# Patient Record
Sex: FEMALE | Race: WHITE | NOT HISPANIC OR LATINO | Employment: FULL TIME | URBAN - METROPOLITAN AREA
[De-identification: names, ages, dates, MRNs, and addresses within clinical notes are randomized per-mention and may not be internally consistent; named-entity substitution may affect disease eponyms.]

---

## 2017-01-04 DIAGNOSIS — I10 ESSENTIAL HYPERTENSION, BENIGN: ICD-10-CM

## 2017-01-04 LAB
ALBUMIN SERPL-MCNC: 4.2 G/DL (ref 3.4–5)
ALP SERPL-CCNC: 141 U/L (ref 40–150)
ALT SERPL W P-5'-P-CCNC: 104 U/L (ref 0–50)
ANION GAP SERPL CALCULATED.3IONS-SCNC: 7 MMOL/L (ref 3–14)
AST SERPL W P-5'-P-CCNC: 68 U/L (ref 0–45)
BILIRUB SERPL-MCNC: 0.6 MG/DL (ref 0.2–1.3)
BUN SERPL-MCNC: 14 MG/DL (ref 7–30)
CALCIUM SERPL-MCNC: 9.5 MG/DL (ref 8.5–10.1)
CHLORIDE SERPL-SCNC: 98 MMOL/L (ref 94–109)
CHOLEST SERPL-MCNC: 205 MG/DL
CO2 SERPL-SCNC: 31 MMOL/L (ref 20–32)
CREAT SERPL-MCNC: 0.62 MG/DL (ref 0.52–1.04)
GFR SERPL CREATININE-BSD FRML MDRD: ABNORMAL ML/MIN/1.7M2
GLUCOSE SERPL-MCNC: 318 MG/DL (ref 70–99)
HBA1C MFR BLD: 13.3 % (ref 4.3–6)
HDLC SERPL-MCNC: 46 MG/DL
LDLC SERPL CALC-MCNC: 110 MG/DL
NONHDLC SERPL-MCNC: 159 MG/DL
POTASSIUM SERPL-SCNC: 3.9 MMOL/L (ref 3.4–5.3)
PROT SERPL-MCNC: 8.1 G/DL (ref 6.8–8.8)
SODIUM SERPL-SCNC: 136 MMOL/L (ref 133–144)
TRIGL SERPL-MCNC: 244 MG/DL

## 2017-01-04 PROCEDURE — 83036 HEMOGLOBIN GLYCOSYLATED A1C: CPT | Performed by: INTERNAL MEDICINE

## 2017-01-04 PROCEDURE — 36415 COLL VENOUS BLD VENIPUNCTURE: CPT | Performed by: INTERNAL MEDICINE

## 2017-01-04 PROCEDURE — 80061 LIPID PANEL: CPT | Performed by: INTERNAL MEDICINE

## 2017-01-04 PROCEDURE — 80053 COMPREHEN METABOLIC PANEL: CPT | Performed by: INTERNAL MEDICINE

## 2017-01-11 ENCOUNTER — TELEPHONE (OUTPATIENT)
Dept: PEDIATRICS | Facility: CLINIC | Age: 47
End: 2017-01-11

## 2017-01-11 ENCOUNTER — OFFICE VISIT (OUTPATIENT)
Dept: PEDIATRICS | Facility: CLINIC | Age: 47
End: 2017-01-11
Payer: COMMERCIAL

## 2017-01-11 VITALS
HEART RATE: 88 BPM | SYSTOLIC BLOOD PRESSURE: 132 MMHG | BODY MASS INDEX: 37.3 KG/M2 | DIASTOLIC BLOOD PRESSURE: 86 MMHG | WEIGHT: 223.9 LBS | TEMPERATURE: 98.3 F | HEIGHT: 65 IN

## 2017-01-11 DIAGNOSIS — L30.9 DERMATITIS: ICD-10-CM

## 2017-01-11 DIAGNOSIS — I10 ESSENTIAL HYPERTENSION, BENIGN: ICD-10-CM

## 2017-01-11 DIAGNOSIS — F43.23 ADJUSTMENT DISORDER WITH MIXED ANXIETY AND DEPRESSED MOOD: ICD-10-CM

## 2017-01-11 DIAGNOSIS — E78.5 DYSLIPIDEMIA: ICD-10-CM

## 2017-01-11 DIAGNOSIS — Z23 NEED FOR PROPHYLACTIC VACCINATION AND INOCULATION AGAINST INFLUENZA: ICD-10-CM

## 2017-01-11 PROCEDURE — 90471 IMMUNIZATION ADMIN: CPT | Performed by: INTERNAL MEDICINE

## 2017-01-11 PROCEDURE — 90686 IIV4 VACC NO PRSV 0.5 ML IM: CPT | Performed by: INTERNAL MEDICINE

## 2017-01-11 PROCEDURE — 99207 C FOOT EXAM  NO CHARGE: CPT | Performed by: INTERNAL MEDICINE

## 2017-01-11 PROCEDURE — 99214 OFFICE O/P EST MOD 30 MIN: CPT | Mod: 25 | Performed by: INTERNAL MEDICINE

## 2017-01-11 RX ORDER — MOMETASONE FUROATE 1 MG/G
OINTMENT TOPICAL
Qty: 45 G | Refills: 0 | Status: SHIPPED | OUTPATIENT
Start: 2017-01-11 | End: 2018-02-21

## 2017-01-11 RX ORDER — ATORVASTATIN CALCIUM 10 MG/1
10 TABLET, FILM COATED ORAL DAILY
Qty: 90 TABLET | Refills: 1 | Status: SHIPPED | OUTPATIENT
Start: 2017-01-11 | End: 2017-03-21

## 2017-01-11 NOTE — MR AVS SNAPSHOT
After Visit Summary   1/11/2017    Lala George    MRN: [id number]           Patient Information     Date Of Birth          1970        Visit Information        Provider Department      1/11/2017 8:30 AM Heidy Jama MD [facility]        Today's Diagnoses     Uncontrolled type 2 diabetes mellitus without complication, with long-term current use of insulin (H)    -  1     Essential hypertension, benign         Dyslipidemia         Dermatitis         Need for prophylactic vaccination and inoculation against influenza         Adjustment disorder with mixed anxiety and depressed mood           Care Instructions    Set up an appointment with Dr. Edmonds, our endocrinologist.  Set up an appointment with diabetes education/medical nutrition therapy in [place].     Restart trulicity at 0.75 mg weekly. After 1 week, increase to 1.5 mg weekly.    Start lipitor 10mg every evening.    My two favorites dermatologists are Dr. Janis Hernández at [facility] in [place] ([phone]) and Dr. Haider Reid at [facility] in [place].([phone]). Check with your insurance to make sure they are covered and call for an appointment.    Start sertraline at 1/2 tablet daily for 6-8 days. Then increase to 1 tablet daily.  See me back in 1 month.    Consider seeing Costa Molina, a therapist at our clinic. You can call for an appointment - [phone].            Follow-ups after your visit        Additional Services     DERMATOLOGY REFERRAL       Your provider has referred you to: N: [facility] [place] [phone]   [email or web address]  N: [facility] [place] [phone]   [email or web address]    Please be aware that coverage of these services is subject to the terms and limitations of your health insurance plan.  Call member services at your health plan with any benefit or coverage questions.      Please  bring the following with you to your appointment:    (1) Any X-Rays, CTs or MRIs which have been performed.  Contact the facility where they were done to arrange for  prior to your scheduled appointment.    (2) List of current medications  (3) This referral request   (4) Any documents/labs given to you for this referral            DIABETES EDUCATOR REFERRAL       DIABETES SELF MANAGEMENT TRAINING (DSMT)      Your provider has referred you to Diabetes Education: FMG: Diabetes Education - [facility] [place] [phone]   [email or web address]    Type of training and number of hours: Previous Diagnosis: Follow-up DSMT - 2 hours.    Medicare covers: 10 hours of initial DSMT in 12 month period from the time of first visit, plus 2 hours of follow-up DSMT annually, and additional hours as requested for insulin training.    Diabetes Type: Type 2 - On Insulin             Diabetes Co-Morbidities: none               A1C Goal:  <7.0       A1C is: A1C     13.3   1/4/2017    If an urgent visit is needed or A1C is above 12, Care Team to call the Diabetes Education Team at [phone] or send an In Basket message to the Diabetes Education Pool (P DIAB ED-PATIENT CARE).    Diabetes Education Topics: Comprehensive Knowledge Assessment and Instruction    Special Educational Needs Requiring Individual DSMT: Additional Insulin Training       MEDICAL NUTRITION THERAPY (MNT) for Diabetes    Medical Nutrition Therapy with a Registered Dietitian can be provided in coordination with Diabetes Self-Management Training to assist in achieving optimal diabetes management.    MNT Type and Hours: New diagnosis: Initial MNT - 3 hours                       Medicare will cover: 3 hours initial MNT in 12 month period after first visit, plus 2 hours of follow-up MNT annually    Please be aware that coverage of these services is subject to the terms and limitations of your health insurance plan.   Call member services at your health plan to determine Diabetes Self-Management Training benefits and ask which blood glucose monitor brands are covered by your plan.      Please bring the following with you to your appointment:    (1)  List of current medications   (2)  List of Blood Glucose Monitor brands that are covered by your insurance plan  (3)  Blood Glucose Monitor and log book  (4)   Food records for the 3 days prior to your visit    The Certified Diabetes Educator may make diabetes medication adjustments per the CDE Protocol and Collaborative Practice Agreement.            [facility] PT, HAND, AND CHIROPRACTIC REFERRAL       **This order will print in the [facility] Scheduling Office**    Physical Therapy, Hand Therapy and Chiropractic Care are available through:    *[facility]  *[facility]  *[facility]    Call one number to schedule at any of the above locations: [phone].    Your provider has referred you to: Physical Therapy at [facility] or [facility]    Indication/Reason for Referral: Women's Health (Please Complete Special Programs SmartList)  Onset of Illness:   Therapy Orders: Evaluate and Treat  Special Programs: Diabetes Focus Exercise and None  Special Request: None    Milo Pedraza      Additional Comments for the Therapist or Chiropractor:     Please be aware that coverage of these services is subject to the terms and limitations of your health insurance plan.  Call member services at your health plan with any benefit or coverage questions.      Please bring the following to your appointment:    *Your personal calendar for scheduling future appointments  *Comfortable clothing                  Who to contact     If you have questions or need follow up information about today's clinic visit or your schedule please contact [facility] [place] directly at [phone].  Normal or non-critical lab and imaging results will be communicated to you by Renetta  "letter or phone within 4 business days after the clinic has received the results. If you do not hear from us within 7 days, please contact the clinic through [facility] or phone. If you have a critical or abnormal lab result, we will notify you by phone as soon as possible.  Submit refill requests through [employer] or call your pharmacy and they will forward the refill request to us. Please allow 3 business days for your refill to be completed.          Additional Information About Your Visit        [facility] Information     [employer] gives you secure access to your electronic health record. If you see a primary care provider, you can also send messages to your care team and make appointments. If you have questions, please call your primary care clinic.  If you do not have a primary care provider, please call [phone] and they will assist you.        Care EveryWhere ID     This is your Care EveryWhere ID. This could be used by other organizations to access your [place] medical records  [id number]        Your Vitals Were     Pulse Temperature Height BMI (Body Mass Index) Last Period       88 98.3  F (36.8  C) (Oral) 5' 5\" (1.651 m) 37.26 kg/m2 01/18/2013        Blood Pressure from Last 3 Encounters:   01/11/17 132/86   07/18/16 134/88   05/18/16 106/76    Weight from Last 3 Encounters:   01/11/17 223 lb 14.4 oz (101.56 kg)   07/18/16 221 lb 1.6 oz (100.29 kg)   05/18/16 224 lb (101.606 kg)              We Performed the Following     DERMATOLOGY REFERRAL     DIABETES EDUCATOR REFERRAL     FLU VAC, SPLIT VIRUS IM > 3 YO (QUADRIVALENT) [41007]     FOOT EXAM     [facility] PT, HAND, AND CHIROPRACTIC REFERRAL     Vaccine Administration, Initial [83468]          Today's Medication Changes          These changes are accurate as of: 1/11/17  9:41 AM.  If you have any questions, ask your nurse or doctor.               Start taking these medicines.        Dose/Directions    atorvastatin 10 MG tablet   Commonly known as:  LIPITOR   Used " for:  Dyslipidemia   Started by:  Heidy Jama MD        Dose:  10 mg   Take 1 tablet (10 mg) by mouth daily   Quantity:  90 tablet   Refills:  1       sertraline 50 MG tablet   Commonly known as:  ZOLOFT   Used for:  Adjustment disorder with mixed anxiety and depressed mood   Started by:  Heidy Jama MD        Take 1/2 tablet (25 mg) for 1-2 weeks, then increase to 1 tablet orally daily   Quantity:  30 tablet   Refills:  1         These medicines have changed or have updated prescriptions.        Dose/Directions    dulaglutide 0.75 MG/0.5ML pen   Commonly known as:  TRULICITY   This may have changed:  additional instructions   Used for:  Essential hypertension, benign   Changed by:  Heidy Jama MD        Dose:  0.75 mg   Inject 0.75 mg Subcutaneous every 7 days Increase to 1.5 mg subcutaneous every 7 days after 1 week if sugars still <200   Quantity:  0.5 mL   Refills:  3            Where to get your medicines      These medications were sent to [facility]/pharmacy #[id number] - [place] - [address] [place] [address] [place] AT [address] [place], [place] [address]     Phone:  [phone]    - atorvastatin 10 MG tablet  - dulaglutide 0.75 MG/0.5ML pen  - insulin detemir 100 UNIT/ML injection  - mometasone 0.1 % ointment  - sertraline 50 MG tablet             Primary Care Provider Office Phone # Fax #    Heidy Jama [phone]       [address] [place] [address]        Thank you!     Thank you for choosing [facility]  for your care. Our goal is always to provide you with excellent care. Hearing back from our patients is one way we can continue to improve our services. Please take a few minutes to complete the written survey that you may receive in the mail after your visit with us. Thank you!             Your Updated Medication List - Protect others around you: Learn how to safely use, store and throw away your medicines at  www.disposemymeds.org.          This list is accurate as of: 1/11/17  9:41 AM.  Always use your most recent med list.                   Brand Name Dispense Instructions for use    albuterol 108 (90 BASE) MCG/ACT Inhaler    VENTOLIN HFA    1 Inhaler    Inhale 1-2 puffs into the lungs every 4 hours as needed       atorvastatin 10 MG tablet    LIPITOR    90 tablet    Take 1 tablet (10 mg) by mouth daily       beclomethasone 40 MCG/ACT Inhaler    QVAR    1 Inhaler    Inhale 2 puffs into the lungs 2 times daily       blood glucose monitoring lancets     1 Box    1 each 2 times daily Use to test blood sugar 4 times daily or as directed.       blood glucose monitoring test strip    ACCU-CHEK SMARTVIEW    3 Box    Use to test blood sugar 2 times daily or as directed.       dulaglutide 0.75 MG/0.5ML pen    TRULICITY    0.5 mL    Inject 0.75 mg Subcutaneous every 7 days Increase to 1.5 mg subcutaneous every 7 days after 1 week if sugars still <200       fluocinonide 0.05 % cream    LIDEX    30 g    apply to hands at bedtime prn       hydrochlorothiazide 25 MG tablet    HYDRODIURIL    90 tablet    Take 1 tablet (25 mg) by mouth every morning       insulin detemir 100 UNIT/ML injection    LEVEMIR FLEXPEN/FLEXTOUCH    18 mL    53 units at bedtime       insulin pen needle 31G X 6 MM    ULTICARE MINI    100 each    Use 10  daily or as directed.       losartan 100 MG tablet    COZAAR    90 tablet    Take 1 tablet (100 mg) by mouth daily       metFORMIN 500 MG 24 hr tablet    GLUCOPHAGE-XR    120 tablet    Take 4 tablets (2,000 mg) by mouth daily (with dinner)       mometasone 0.1 % ointment    ELOCON    45 g    Apply sparingly to affected area twice daily as needed.  Do not apply to face.       sertraline 50 MG tablet    ZOLOFT    30 tablet    Take 1/2 tablet (25 mg) for 1-2 weeks, then increase to 1 tablet orally daily

## 2017-01-11 NOTE — PROGRESS NOTES
"  SUBJECTIVE:                                                    Lala George is a 46 year old female who presents to clinic today for the following health issues:        Diabetes Follow-up    Patient is checking blood sugars: twice daily.  Results are as follows:         am - has not done recently         bedtime - 250-350    Diabetic concerns: None and blood sugar frequently over 200     Symptoms of hypoglycemia (low blood sugar): none     Paresthesias (numbness or burning in feet) or sores: No     Date of last diabetic eye exam: 7/2016     Hyperlipidemia Follow-Up      Rate your low fat/cholesterol diet?: good    Taking statin?  No    Other lipid medications/supplements?:  none     Hypertension Follow-up      Outpatient blood pressures are being checked at home.  Results are 125/85.    Low Salt Diet: low salt         Amount of exercise or physical activity: 2 days/week for an average of 15-20 minutes    Problems taking medications regularly: No    Medication side effects: none    Diet: carbohydrate counting    In the last year she has noticed more anxiety and depression. Cries easily. Struggling to take care of her diabetes. Feels she needs to get some help for this.     Problem list and histories reviewed & adjusted, as indicated.  Additional history: as documented    Problem list, Medication list, Allergies, and Medical/Social/Surgical histories reviewed in [facility] and updated as appropriate.    ROS:  Constitutional, HEENT, cardiovascular, pulmonary, GI, , musculoskeletal, neuro, skin, endocrine and psych systems are negative, except as otherwise noted.    OBJECTIVE:                                                    /86 mmHg  Pulse 88  Temp(Src) 98.3  F (36.8  C) (Oral)  Ht 5' 5\" (1.651 m)  Wt 223 lb 14.4 oz (101.56 kg)  BMI 37.26 kg/m2  LMP 01/18/2013  Body mass index is 37.26 kg/(m^2).  GENERAL: healthy, alert and no distress  NECK: no adenopathy, no asymmetry, masses, or scars and thyroid normal " to palpation  RESP: lungs clear to auscultation - no rales, rhonchi or wheezes  CV: regular rate and rhythm, normal S1 S2, no S3 or S4, no murmur, click or rub, no peripheral edema and peripheral pulses strong  ABDOMEN: soft, nontender, no hepatosplenomegaly, no masses and bowel sounds normal  MS: no gross musculoskeletal defects noted, no edema  NEURO: Normal strength and tone, mentation intact and speech normal  PSYCH: mentation appears normal, affect normal/bright  Diabetic foot exam: normal DP and PT pulses, no trophic changes or ulcerative lesions and normal monofilament exam  SKIN: patches of erythematous plaque. Antecubital regions.    Diagnostic Test Results:  Results for orders placed or performed in visit on 01/04/17   Hemoglobin A1c   Result Value Ref Range    Hemoglobin A1C 13.3 (H) 4.3 - 6.0 %   Lipid panel reflex to direct LDL   Result Value Ref Range    Cholesterol 205 (H) <200 mg/dL    Triglycerides 244 (H) <150 mg/dL    HDL Cholesterol 46 (L) >49 mg/dL    LDL Cholesterol Calculated 110 (H) <100 mg/dL    Non HDL Cholesterol 159 (H) <130 mg/dL   Comprehensive metabolic panel   Result Value Ref Range    Sodium 136 133 - 144 mmol/L    Potassium 3.9 3.4 - 5.3 mmol/L    Chloride 98 94 - 109 mmol/L    Carbon Dioxide 31 20 - 32 mmol/L    Anion Gap 7 3 - 14 mmol/L    Glucose 318 (H) 70 - 99 mg/dL    Urea Nitrogen 14 7 - 30 mg/dL    Creatinine 0.62 0.52 - 1.04 mg/dL    GFR Estimate >90  Non  GFR Calc   >60 mL/min/1.7m2    GFR Estimate If Black >90   GFR Calc   >60 mL/min/1.7m2    Calcium 9.5 8.5 - 10.1 mg/dL    Bilirubin Total 0.6 0.2 - 1.3 mg/dL    Albumin 4.2 3.4 - 5.0 g/dL    Protein Total 8.1 6.8 - 8.8 g/dL    Alkaline Phosphatase 141 40 - 150 U/L     (H) 0 - 50 U/L    AST 68 (H) 0 - 45 U/L        ASSESSMENT/PLAN:                                                      1. Uncontrolled type 2 diabetes mellitus without complication, with long-term current use of insulin  (H)  Inadequate control. See patient instructions section. Plan to restart trulicity.  - FOOT EXAM  - DIABETES EDUCATOR REFERRAL  - insulin detemir (LEVEMIR FLEXPEN/FLEXTOUCH) 100 UNIT/ML injection; 53 units at bedtime  Dispense: 18 mL; Refill: 11  - [facility] PT, HAND, AND CHIROPRACTIC REFERRAL    2. Essential hypertension, benign  Fair control  - dulaglutide (TRULICITY) 0.75 MG/0.5ML pen; Inject 0.75 mg Subcutaneous every 7 days Increase to 1.5 mg subcutaneous every 7 days after 1 week if sugars still <200  Dispense: 0.5 mL; Refill: 3  - insulin detemir (LEVEMIR FLEXPEN/FLEXTOUCH) 100 UNIT/ML injection; 53 units at bedtime  Dispense: 18 mL; Refill: 11    3. Dyslipidemia  Add lipitor.  - atorvastatin (LIPITOR) 10 MG tablet; Take 1 tablet (10 mg) by mouth daily  Dispense: 90 tablet; Refill: 1    4. Dermatitis  Trial on steroid cream  - mometasone (ELOCON) 0.1 % ointment; Apply sparingly to affected area twice daily as needed.  Do not apply to face.  Dispense: 45 g; Refill: 0  - DERMATOLOGY REFERRAL    5. Need for prophylactic vaccination and inoculation against influenza    - FLU VAC, SPLIT VIRUS IM > 3 YO (QUADRIVALENT) [78219]  - Vaccine Administration, Initial [83933]    6. Adjustment disorder with mixed anxiety and depressed mood  I've explained to him/her that drugs of the SSRI class can have side effects such as weight gain, sexual dysfunction, insomnia, headache, nausea. These medications are generally effective at alleviating symptoms of anxiety and/or depression. Let me know if significant side effects do occur. Discussed the possibility of increased suicidal behavior/ideation in adolescents with antidepressants.  Patient agrees if any worsening of symptoms, or any thoughts of self harm he/she will let me know immediately. If have notified the patient to go to the ER if any thoughts of suicide.  - sertraline (ZOLOFT) 50 MG tablet; Take 1/2 tablet (25 mg) for 1-2 weeks, then increase to 1 tablet orally daily   Dispense: 30 tablet; Refill: 1    Patient Instructions   Set up an appointment with Dr. Edmonds, our endocrinologist.  Set up an appointment with diabetes education/medical nutrition therapy in [place].     Restart trulicity at 0.75 mg weekly. After 1 week, increase to 1.5 mg weekly.    Start lipitor 10mg every evening.    My two favorites dermatologists are Dr. Janis Hernández at [facility] in [place] ([phone]) and Dr. Haider Reid at [facility] in [place].([phone]). Check with your insurance to make sure they are covered and call for an appointment.    Start sertraline at 1/2 tablet daily for 6-8 days. Then increase to 1 tablet daily.  See me back in 1 month.    Consider seeing Costa Molina, a therapist at our clinic. You can call for an appointment - [phone].            Heidy Rausch MD  [facility]  Injectable Influenza Immunization Documentation    1.  Is the person to be vaccinated sick today?  No    2. Does the person to be vaccinated have an allergy to eggs or to a component of the vaccine?  No    3. Has the person to be vaccinated today ever had a serious reaction to influenza vaccine in the past?  No    4. Has the person to be vaccinated ever had Guillain-Colbert syndrome?  No     Form completed by Geneva Pelaez LPN

## 2017-01-11 NOTE — PATIENT INSTRUCTIONS
Set up an appointment with Dr. Edmonds, our endocrinologist.  Set up an appointment with diabetes education/medical nutrition therapy in [place].     Restart trulicity at 0.75 mg weekly. After 1 week, increase to 1.5 mg weekly.    Start lipitor 10mg every evening.    My two favorites dermatologists are Dr. Janis Hernández at [facility] in [place] ([phone]) and Dr. Haider Reid at [facility] in [place].([phone]). Check with your insurance to make sure they are covered and call for an appointment.    Start sertraline at 1/2 tablet daily for 6-8 days. Then increase to 1 tablet daily.  See me back in 1 month.    Consider seeing Costa Molina, a therapist at our clinic. You can call for an appointment - [phone].

## 2017-01-11 NOTE — NURSING NOTE
"Chief Complaint   Patient presents with     Diabetes       Initial /86 mmHg  Pulse 88  Temp(Src) 98.3  F (36.8  C) (Oral)  Ht 5' 5\" (1.651 m)  Wt 223 lb 14.4 oz (101.56 kg)  BMI 37.26 kg/m2  LMP 01/18/2013 Estimated body mass index is 37.26 kg/(m^2) as calculated from the following:    Height as of this encounter: 5' 5\" (1.651 m).    Weight as of this encounter: 223 lb 14.4 oz (101.56 kg).  BP completed using cuff size: ana maria Pelaez LPN    "

## 2017-01-11 NOTE — TELEPHONE ENCOUNTER
Type of outreach:  talked talked with patient  Health Maintenance Due   Topic Date Due     EYE EXAM Q1 YEAR( NO INBASKET)  02/03/2016     FOOT EXAM Q1 YEAR( NO INBASKET)  03/02/2016     INFLUENZA VACCINE (SYSTEM ASSIGNED)  09/01/2016     ASTHMA CONTROL TEST Q6 MOS (NO INBASKET)  11/18/2016     Patient Will schedule eye exam.HM completed at   Geneva Pelaez LPN

## 2017-01-12 ASSESSMENT — PATIENT HEALTH QUESTIONNAIRE - PHQ9: SUM OF ALL RESPONSES TO PHQ QUESTIONS 1-9: 6

## 2017-01-12 ASSESSMENT — ASTHMA QUESTIONNAIRES: ACT_TOTALSCORE: 25

## 2017-01-13 DIAGNOSIS — E11.9 TYPE 2 DIABETES MELLITUS WITHOUT COMPLICATION, WITHOUT LONG-TERM CURRENT USE OF INSULIN (H): Primary | ICD-10-CM

## 2017-01-13 RX ORDER — METFORMIN HCL 500 MG
2000 TABLET, EXTENDED RELEASE 24 HR ORAL
Qty: 360 TABLET | Refills: 1 | Status: SHIPPED | OUTPATIENT
Start: 2017-01-13 | End: 2017-03-21

## 2017-01-13 NOTE — TELEPHONE ENCOUNTER
Metformin          Last Written Prescription Date: 12/13/16  Last Fill Quantity: unknown new to our pharmacy, # refills: unknown  Last Office Visit with G, P or [facility] prescribing provider:  1/11/17        BP Readings from Last 3 Encounters:   01/11/17 132/86   07/18/16 134/88   05/18/16 106/76     MICROL       35   5/18/2016  No results found for this basename: microalbumin  CREATININE   Date Value Ref Range Status   01/04/2017 0.62 0.52 - 1.04 mg/dL Final   ]  GFR ESTIMATE   Date Value Ref Range Status   01/04/2017 >90  Non  GFR Calc   >60 mL/min/1.7m2 Final   05/18/2016 >90  Non  GFR Calc   >60 mL/min/1.7m2 Final   04/01/2015 84 >60 mL/min/1.7m2 Final     Comment:     Non  GFR Calc     GFR ESTIMATE IF BLACK   Date Value Ref Range Status   01/04/2017 >90   GFR Calc   >60 mL/min/1.7m2 Final   05/18/2016 >90   GFR Calc   >60 mL/min/1.7m2 Final   04/01/2015 >90   GFR Calc   >60 mL/min/1.7m2 Final     CHOL      205   1/4/2017  HDL       46   1/4/2017  LDL      110   1/4/2017  TRIG      244   1/4/2017  CHOLHDLRATIO      3.2   4/1/2015  AST       68   1/4/2017  ALT      104   1/4/2017  A1C     13.3   1/4/2017  A1C     14.6   5/18/2016  A1C      8.1   4/1/2015  A1C     11.6   12/31/2014  POTASSIUM   Date Value Ref Range Status   01/04/2017 3.9 3.4 - 5.3 mmol/L Final     Niru Hong [facility]   [phone]

## 2017-02-08 ENCOUNTER — TELEPHONE (OUTPATIENT)
Dept: PHARMACY | Facility: CLINIC | Age: 47
End: 2017-02-08

## 2017-02-08 NOTE — TELEPHONE ENCOUNTER
Please call patient to make endocrinology appointment as recommended by Dr. Jama at January visit.    Bailey Scruggs, PharmD [facility]  Medication Therapy Management Practitioner   #[phone]

## 2017-02-15 ENCOUNTER — THERAPY VISIT (OUTPATIENT)
Dept: PHYSICAL THERAPY | Facility: CLINIC | Age: 47
End: 2017-02-15
Payer: COMMERCIAL

## 2017-02-15 DIAGNOSIS — M25.552 HIP PAIN, LEFT: Primary | ICD-10-CM

## 2017-02-15 DIAGNOSIS — E11.9 DIABETES MELLITUS (H): ICD-10-CM

## 2017-02-15 PROCEDURE — 97163 PT EVAL HIGH COMPLEX 45 MIN: CPT | Mod: GP | Performed by: PHYSICAL THERAPIST

## 2017-02-15 PROCEDURE — 97110 THERAPEUTIC EXERCISES: CPT | Mod: GP | Performed by: PHYSICAL THERAPIST

## 2017-02-15 NOTE — PROGRESS NOTES
Subjective:    Lala George is a 46 year old female with a left hip condition.  Condition occurred with:  A fall/slip.  Condition occurred: in the community.  This is a chronic condition  History of type II diabetes for 2 years. Pt injured left hip and lateral thigh one year ago secondary to a fall at the library. Left hip/ thigh pain limiting pt's activity level. Pt referred by MD for therapy on 1-11-17.      Radiates to:  Thigh and hip.  Pain is described as aching and is intermittent and reported as 5/10.  Associated symptoms:  Loss of motion/stiffness and loss of strength. Pain is worse during the day.  Symptoms are exacerbated by standing, ascending stairs and walking and relieved by rest.  Since onset symptoms are unchanged.  Special testing: none.  Previous treatment: none.    General health as reported by patient is good.  Pertinent medical history includes:  High blood pressure, overweight, asthma and diabetes.  Medical allergies: no.  Other surgeries include:  None reported.  Current medications:  High blood pressure medication (insulin).  Current occupation is .    Primary job tasks include:  Prolonged sitting and prolonged standing.    Barriers include:  None as reported by the patient.    Red flags: diabetes.                      Objective:      Gait:  Decreased pelvic stability with ambualtion        Flexibility/Screens:       Lower Extremity:      Decreased right lower extremity flexibility:  IT Band                                                 Hip Evaluation  HIP AROM:    Flexion: Left: 105   Right:       Abduction: Left: 10    Right:               Hip PROM:    Flexion: Left: 115  Right:    Abduction: Left: 20   Right:                    Hip Strength:    Flexion:   Right: 4/5   Pain:                    Extension:  Right: 5/5    Pain:    Abduction:  Right: 4-/5    Pain:  Adduction:  Right: 5/5   Pain:  Internal Rotation:  Right: 5/5   Pain:  External Rotation:  Right: 4-/5    Pain:            Hip Special Testing:       Right hip positive for the following special tests:  Kael's    Hip Palpation:      Left hip tenderness not present at:  IT Band               General     ROS    Assessment/Plan:      Patient is a 46 year old female with left side hip complaints.    Patient has the following significant findings with corresponding treatment plan.                Diagnosis 1:  Left hip pain/ diabetes  Pain -  hot/cold therapy, self management, education and home program  Decreased ROM/flexibility - therapeutic exercise, therapeutic activity and home program  Decreased strength - therapeutic exercise, therapeutic activities and home program  Impaired gait - home program  Decreased function - therapeutic activities and home program    Therapy Evaluation Codes:   1) History comprised of:   Personal factors that impact the plan of care:      Overall behavior pattern, Past/current experiences, Profession and Time since onset of symptoms.    Comorbidity factors that impact the plan of care are:      Asthma, Diabetes, High blood pressure and Overweight.     Medications impacting care: High blood pressure and insulin.  2) Examination of Body Systems comprised of:   Body structures and functions that impact the plan of care:      Hip, Knee and Lumbar spine.   Activity limitations that impact the plan of care are:      Squatting/kneeling, Stairs, Standing, Walking and Working.  3) Clinical presentation characteristics are:   Unstable/Unpredictable.  4) Decision-Making    High complexity using standardized patient assessment instrument and/or measureable assessment of functional outcome.  Cumulative Therapy Evaluation is: High complexity.    Previous and current functional limitations:  (See Goal Flow Sheet for this information)    Short term and Long term goals: (See Goal Flow Sheet for this information)     Communication ability:  Patient appears to be able to clearly communicate and understand verbal  and written communication and follow directions correctly.  Treatment Explanation - The following has been discussed with the patient:   RX ordered/plan of care  Anticipated outcomes  Possible risks and side effects  This patient would benefit from PT intervention to resume normal activities.   Rehab potential is good.    Frequency:  1 X week, once daily  Duration:  for 3 weeks  Discharge Plan:  Achieve all LTG.  Independent in home treatment program.  Reach maximal therapeutic benefit.    Please refer to the daily flowsheet for treatment today, total treatment time and time spent performing 1:1 timed codes.

## 2017-02-15 NOTE — MR AVS SNAPSHOT
After Visit Summary   2/15/2017    Lala George    MRN: [id number]           Patient Information     Date Of Birth          1970        Visit Information        Provider Department      2/15/2017 8:50 AM Shay Velazquez, PT [facility]        Today's Diagnoses     Hip pain, left    -  1    Diabetes mellitus (H)           Follow-ups after your visit        Who to contact     If you have questions or need follow up information about today's clinic visit or your schedule please contact [facility] directly at [phone].  Normal or non-critical lab and imaging results will be communicated to you by CL3VERhart, letter or phone within 4 business days after the clinic has received the results. If you do not hear from us within 7 days, please contact the clinic through [employer] or phone. If you have a critical or abnormal lab result, we will notify you by phone as soon as possible.  Submit refill requests through NextUser or call your pharmacy and they will forward the refill request to us. Please allow 3 business days for your refill to be completed.          Additional Information About Your Visit        MyChart Information     NextUser gives you secure access to your electronic health record. If you see a primary care provider, you can also send messages to your care team and make appointments. If you have questions, please call your primary care clinic.  If you do not have a primary care provider, please call [phone] and they will assist you.        Care EveryWhere ID     This is your Care EveryWhere ID. This could be used by other organizations to access your [facility] medical records  [id number]        Your Vitals Were     Last Period                   01/18/2013            Blood Pressure from Last 3 Encounters:   01/11/17 132/86   07/18/16 134/88   05/18/16 106/76    Weight from Last 3 Encounters:   01/11/17 101.6 kg (223 lb 14.4 oz)   07/18/16 100.3 kg (221 lb 1.6 oz)   05/18/16  101.6 kg (224 lb)              We Performed the Following     OT Eval, High Complexity (20032)     PT Eval, High Complexity (60991)     Therapeutic Exercises        Primary Care Provider Office Phone # Fax #    Heidy Jama [phone]       [facility] [address] [place] [address]        Thank you!     Thank you for choosing [facility]  for your care. Our goal is always to provide you with excellent care. Hearing back from our patients is one way we can continue to improve our services. Please take a few minutes to complete the written survey that you may receive in the mail after your visit with us. Thank you!             Your Updated Medication List - Protect others around you: Learn how to safely use, store and throw away your medicines at www.disposemymeds.org.          This list is accurate as of: 2/15/17 12:34 PM.  Always use your most recent med list.                   Brand Name Dispense Instructions for use    albuterol 108 (90 BASE) MCG/ACT Inhaler    VENTOLIN HFA    1 Inhaler    Inhale 1-2 puffs into the lungs every 4 hours as needed       atorvastatin 10 MG tablet    LIPITOR    90 tablet    Take 1 tablet (10 mg) by mouth daily       beclomethasone 40 MCG/ACT Inhaler    QVAR    1 Inhaler    Inhale 2 puffs into the lungs 2 times daily       blood glucose monitoring lancets     1 Box    1 each 2 times daily Use to test blood sugar 4 times daily or as directed.       blood glucose monitoring test strip    ACCU-CHEK SMARTVIEW    3 Box    Use to test blood sugar 2 times daily or as directed.       dulaglutide 0.75 MG/0.5ML pen    TRULICITY    0.5 mL    Inject 0.75 mg Subcutaneous every 7 days Increase to 1.5 mg subcutaneous every 7 days after 1 week if sugars still <200       fluocinonide 0.05 % cream    LIDEX    30 g    apply to hands at bedtime prn       hydrochlorothiazide 25 MG tablet    HYDRODIURIL    90 tablet    Take 1 tablet (25 mg) by mouth every morning        insulin detemir 100 UNIT/ML injection    LEVEMIR FLEXPEN/FLEXTOUCH    18 mL    53 units at bedtime       insulin pen needle 31G X 6 MM    ULTICARE MINI    100 each    Use 10  daily or as directed.       losartan 100 MG tablet    COZAAR    90 tablet    Take 1 tablet (100 mg) by mouth daily       metFORMIN 500 MG 24 hr tablet    GLUCOPHAGE-XR    360 tablet    Take 4 tablets (2,000 mg) by mouth daily (with dinner)       mometasone 0.1 % ointment    ELOCON    45 g    Apply sparingly to affected area twice daily as needed.  Do not apply to face.       sertraline 50 MG tablet    ZOLOFT    30 tablet    Take 1/2 tablet (25 mg) for 1-2 weeks, then increase to 1 tablet orally daily

## 2017-03-21 DIAGNOSIS — E11.9 TYPE 2 DIABETES MELLITUS WITHOUT COMPLICATION, WITHOUT LONG-TERM CURRENT USE OF INSULIN (H): ICD-10-CM

## 2017-03-21 DIAGNOSIS — F43.23 ADJUSTMENT DISORDER WITH MIXED ANXIETY AND DEPRESSED MOOD: ICD-10-CM

## 2017-03-21 DIAGNOSIS — I10 ESSENTIAL HYPERTENSION, BENIGN: ICD-10-CM

## 2017-03-21 DIAGNOSIS — E11.9 DIABETES MELLITUS, TYPE 2 (H): ICD-10-CM

## 2017-03-21 DIAGNOSIS — E78.5 DYSLIPIDEMIA: ICD-10-CM

## 2017-03-21 RX ORDER — LOSARTAN POTASSIUM 100 MG/1
100 TABLET ORAL DAILY
Qty: 90 TABLET | Refills: 0 | Status: SHIPPED | OUTPATIENT
Start: 2017-03-21 | End: 2017-04-10

## 2017-03-21 RX ORDER — METFORMIN HCL 500 MG
2000 TABLET, EXTENDED RELEASE 24 HR ORAL
Qty: 360 TABLET | Refills: 0 | Status: SHIPPED | OUTPATIENT
Start: 2017-03-21 | End: 2017-05-14

## 2017-03-21 RX ORDER — LANCETS
1 EACH MISCELLANEOUS 2 TIMES DAILY
Qty: 3 BOX | Refills: 3 | Status: SHIPPED | OUTPATIENT
Start: 2017-03-21 | End: 2017-04-10

## 2017-03-21 RX ORDER — ATORVASTATIN CALCIUM 10 MG/1
10 TABLET, FILM COATED ORAL DAILY
Qty: 90 TABLET | Refills: 0 | Status: SHIPPED | OUTPATIENT
Start: 2017-03-21 | End: 2017-04-10

## 2017-03-21 RX ORDER — HYDROCHLOROTHIAZIDE 25 MG/1
25 TABLET ORAL EVERY MORNING
Qty: 90 TABLET | Refills: 0 | Status: SHIPPED | OUTPATIENT
Start: 2017-03-21 | End: 2017-04-10

## 2017-03-21 NOTE — TELEPHONE ENCOUNTER
Patient calls for multiple refills to be sent to a mail order as she switched pharmacies.    Sent the refills on all requested medications as patient had refills on them at a local pharmacy-transferred the remaining refills.  Patient is due for appointment and scheduled:    Next 5 appointments (look out 90 days)     Apr 10, 2017  9:30 AM CDT   SHORT with Heidy Jama MD   [facility]    [address] [place] [id number] [phone]                  Asking for a refill on sertraline and Trulicity will route to provider, unable to provide a 90 day suppy as patient is due for appointment-scheduled.  Ok for 90 days?  Routing to PCP.    Sameera Cespedes RN  Message handled by Nurse Triage.

## 2017-03-22 NOTE — TELEPHONE ENCOUNTER
Please let sandra know that the rx for trulicity I sent is for the 1.5 mg dose. She was to increase to this from the initial dose of .75 mg if her sugars were > 200. If she is still on the .75 mg dose, let me know.  Heidy Jama M.D.

## 2017-03-23 DIAGNOSIS — E11.22 TYPE 2 DIABETES MELLITUS WITH STAGE 1 CHRONIC KIDNEY DISEASE, WITHOUT LONG-TERM CURRENT USE OF INSULIN (H): Primary | ICD-10-CM

## 2017-03-23 DIAGNOSIS — N18.1 TYPE 2 DIABETES MELLITUS WITH STAGE 1 CHRONIC KIDNEY DISEASE, WITHOUT LONG-TERM CURRENT USE OF INSULIN (H): Primary | ICD-10-CM

## 2017-03-30 NOTE — TELEPHONE ENCOUNTER
Wait for PCP to address.    Jacquelyn Diez MD  Internal Medicine/Pediatrics  [facility]

## 2017-03-30 NOTE — TELEPHONE ENCOUNTER
Coverage is denied for trulicity.  Trulicity can be approved if patient has 3 month trail resulting in failure, contraindication or or intolerance must be provided.  Bydureon ( extended release)  Kristy casiano advise.  Geneva Pelaez LPN

## 2017-03-31 DIAGNOSIS — F43.23 ADJUSTMENT DISORDER WITH MIXED ANXIETY AND DEPRESSED MOOD: ICD-10-CM

## 2017-03-31 NOTE — TELEPHONE ENCOUNTER
Routing refill request to provider for review/approval because:  Patient history of suicidal ideation. Appointment coming up.  Please sign if ok.  Cecelia Arroyo RN  Triage Nurse

## 2017-04-05 NOTE — TELEPHONE ENCOUNTER
Spoke with patient and advised of below. She states that they also do not cover Accu-Check so needs one touch sent to pharmacy. Routing to provider as patient is due to be seen and need to know if ok for 3 mo supply to mail order.  Laura Finch RN

## 2017-04-07 PROBLEM — M25.552 HIP PAIN, LEFT: Status: RESOLVED | Noted: 2017-02-15 | Resolved: 2017-04-07

## 2017-04-07 PROBLEM — E11.9 DIABETES MELLITUS (H): Status: RESOLVED | Noted: 2017-02-15 | Resolved: 2017-04-07

## 2017-04-10 ENCOUNTER — OFFICE VISIT (OUTPATIENT)
Dept: PEDIATRICS | Facility: CLINIC | Age: 47
End: 2017-04-10
Payer: COMMERCIAL

## 2017-04-10 VITALS
WEIGHT: 217.7 LBS | HEART RATE: 82 BPM | SYSTOLIC BLOOD PRESSURE: 128 MMHG | DIASTOLIC BLOOD PRESSURE: 78 MMHG | TEMPERATURE: 98.2 F | BODY MASS INDEX: 36.27 KG/M2 | OXYGEN SATURATION: 97 % | HEIGHT: 65 IN

## 2017-04-10 DIAGNOSIS — F43.23 ADJUSTMENT DISORDER WITH MIXED ANXIETY AND DEPRESSED MOOD: ICD-10-CM

## 2017-04-10 DIAGNOSIS — E78.5 HYPERLIPIDEMIA LDL GOAL <100: ICD-10-CM

## 2017-04-10 DIAGNOSIS — I10 ESSENTIAL HYPERTENSION, BENIGN: ICD-10-CM

## 2017-04-10 LAB — HBA1C MFR BLD: 13 % (ref 4.3–6)

## 2017-04-10 PROCEDURE — 80053 COMPREHEN METABOLIC PANEL: CPT | Performed by: INTERNAL MEDICINE

## 2017-04-10 PROCEDURE — 99214 OFFICE O/P EST MOD 30 MIN: CPT | Performed by: INTERNAL MEDICINE

## 2017-04-10 PROCEDURE — 36415 COLL VENOUS BLD VENIPUNCTURE: CPT | Performed by: INTERNAL MEDICINE

## 2017-04-10 PROCEDURE — 80061 LIPID PANEL: CPT | Performed by: INTERNAL MEDICINE

## 2017-04-10 PROCEDURE — 83036 HEMOGLOBIN GLYCOSYLATED A1C: CPT | Performed by: INTERNAL MEDICINE

## 2017-04-10 RX ORDER — ATORVASTATIN CALCIUM 10 MG/1
10 TABLET, FILM COATED ORAL DAILY
Qty: 90 TABLET | Refills: 3 | Status: SHIPPED | OUTPATIENT
Start: 2017-04-10 | End: 2018-01-24

## 2017-04-10 RX ORDER — LOSARTAN POTASSIUM 100 MG/1
100 TABLET ORAL DAILY
Qty: 90 TABLET | Refills: 3 | Status: SHIPPED | OUTPATIENT
Start: 2017-04-10 | End: 2018-01-24

## 2017-04-10 RX ORDER — HYDROCHLOROTHIAZIDE 25 MG/1
25 TABLET ORAL EVERY MORNING
Qty: 90 TABLET | Refills: 3 | Status: SHIPPED | OUTPATIENT
Start: 2017-04-10 | End: 2018-01-24

## 2017-04-10 NOTE — MR AVS SNAPSHOT
After Visit Summary   4/10/2017    Lala George    MRN: [id number]           Patient Information     Date Of Birth          1970        Visit Information        Provider Department      4/10/2017 9:30 AM Heidy Jama MD [facility]        Today's Diagnoses     Dyslipidemia        BENIGN HYPERTENSION        Uncontrolled type 2 diabetes mellitus without complication, with long-term current use of insulin (H)        Essential hypertension, benign        Adjustment disorder with mixed anxiety and depressed mood          Care Instructions    Set an appointment with the diabetes educator.    Start the tanzeum.    Consider seeing Dr. Edmonds, our endocrinologist.    Continue your other medications as you are.    Let's meet again in 3 months. We can do nonfasting labs a few days before.        Follow-ups after your visit        Additional Services     DIABETES EDUCATOR REFERRAL       DIABETES SELF MANAGEMENT TRAINING (DSMT)      Your provider has referred you to Diabetes Education: FMG: Diabetes Education - All [facility] [phone]   [email or web address]    Type of training and number of hours: Previous Diagnosis: Follow-up DSMT - 2 hours.    Medicare covers: 10 hours of initial DSMT in 12 month period from the time of first visit, plus 2 hours of follow-up DSMT annually, and additional hours as requested for insulin training.    Diabetes Type: Type 2 - On Insulin             Diabetes Co-Morbidities: none               A1C Goal:  <7.0       A1C is: Lab Results       Component                Value               Date                       A1C                      13.3                01/04/2017              If an urgent visit is needed or A1C is above 12, Care Team to call the Diabetes Education Team at [phone] or send an In Basket message to the Diabetes Education Pool (P DIAB ED-PATIENT CARE).    Diabetes Education Topics:  Comprehensive Knowledge Assessment and Instruction    Special Educational Needs Requiring Individual DSMT: None       MEDICAL NUTRITION THERAPY (MNT) for Diabetes    Medical Nutrition Therapy with a Registered Dietitian can be provided in coordination with Diabetes Self-Management Training to assist in achieving optimal diabetes management.    MNT Type and Hours: Previous diagnosis: Annual follow-up MNT - 2 hours                       Medicare will cover: 3 hours initial MNT in 12 month period after first visit, plus 2 hours of follow-up MNT annually    Please be aware that coverage of these services is subject to the terms and limitations of your health insurance plan.  Call member services at your health plan to determine Diabetes Self-Management Training benefits and ask which blood glucose monitor brands are covered by your plan.      Please bring the following with you to your appointment:    (1)  List of current medications   (2)  List of Blood Glucose Monitor brands that are covered by your insurance plan  (3)  Blood Glucose Monitor and log book  (4)   Food records for the 3 days prior to your visit    The Certified Diabetes Educator may make diabetes medication adjustments per the CDE Protocol and Collaborative Practice Agreement.                  Who to contact     If you have questions or need follow up information about today's clinic visit or your schedule please contact [facility] directly at [phone].  Normal or non-critical lab and imaging results will be communicated to you by MyChart, letter or phone within 4 business days after the clinic has received the results. If you do not hear from us within 7 days, please contact the clinic through MyChart or phone. If you have a critical or abnormal lab result, we will notify you by phone as soon as possible.  Submit refill requests through [employer] or call your pharmacy and they will forward the refill request to us. Please allow 3 business  "days for your refill to be completed.          Additional Information About Your Visit        iRateshart Information     [employer] gives you secure access to your electronic health record. If you see a primary care provider, you can also send messages to your care team and make appointments. If you have questions, please call your primary care clinic.  If you do not have a primary care provider, please call [phone] and they will assist you.        Care EveryWhere ID     This is your Care EveryWhere ID. This could be used by other organizations to access your [place] medical records  [id number]        Your Vitals Were     Pulse Temperature Height Last Period Pulse Oximetry BMI (Body Mass Index)    82 98.2  F (36.8  C) (Oral) 5' 5\" (1.651 m) 01/18/2013 97% 36.23 kg/m2       Blood Pressure from Last 3 Encounters:   04/10/17 128/78   01/11/17 132/86   07/18/16 134/88    Weight from Last 3 Encounters:   04/10/17 217 lb 11.2 oz (98.7 kg)   01/11/17 223 lb 14.4 oz (101.6 kg)   07/18/16 221 lb 1.6 oz (100.3 kg)              We Performed the Following     Comprehensive metabolic panel     DIABETES EDUCATOR REFERRAL     Hemoglobin A1c     Lipid panel reflex to direct LDL          Today's Medication Changes          These changes are accurate as of: 4/10/17 10:46 AM.  If you have any questions, ask your nurse or doctor.               These medicines have changed or have updated prescriptions.        Dose/Directions    blood glucose monitoring test strip   Commonly known as:  no brand specified   This may have changed:  Another medication with the same name was removed. Continue taking this medication, and follow the directions you see here.   Used for:  Type 2 diabetes mellitus with stage 1 chronic kidney disease, without long-term current use of insulin (H)   Changed by:  Heidy Jama MD        Use to test blood sugars 2 times daily or as directed. One touch or as covered by insurance   Quantity:  200 strip "   Refills:  11         Stop taking these medicines if you haven't already. Please contact your care team if you have questions.     blood glucose monitoring lancets   Stopped by:  Heidy Jama MD           dulaglutide 0.75 MG/0.5ML pen   Commonly known as:  TRULICITY   Stopped by:  Heidy Jama MD           dulaglutide 1.5 MG/0.5ML pen   Commonly known as:  TRULICITY   Stopped by:  Heidy Jama MD                Where to get your medicines      These medications were sent to [facility] - [address], [facility] [address]     Phone:  [phone]     atorvastatin 10 MG tablet    hydrochlorothiazide 25 MG tablet    losartan 100 MG tablet    sertraline 50 MG tablet                Primary Care Provider Office Phone # Fax #    Heidy Jama [phone]       [address] [place] [address]        Thank you!     Thank you for choosing [facility]  for your care. Our goal is always to provide you with excellent care. Hearing back from our patients is one way we can continue to improve our services. Please take a few minutes to complete the written survey that you may receive in the mail after your visit with us. Thank you!             Your Updated Medication List - Protect others around you: Learn how to safely use, store and throw away your medicines at www.disposemymeds.org.          This list is accurate as of: 4/10/17 10:46 AM.  Always use your most recent med list.                   Brand Name Dispense Instructions for use    albiglutide 30 MG pen    TANZEUM    12 each    Inject 30 mg Subcutaneous once a week       albuterol 108 (90 BASE) MCG/ACT Inhaler    VENTOLIN HFA    1 Inhaler    Inhale 1-2 puffs into the lungs every 4 hours as needed       atorvastatin 10 MG tablet    LIPITOR    90 tablet    Take 1 tablet (10 mg) by mouth daily       beclomethasone 40  MCG/ACT Inhaler    QVAR    1 Inhaler    Inhale 2 puffs into the lungs 2 times daily       blood glucose monitoring meter device kit    no brand specified    1 kit    Use to test blood sugar 2 times daily or as directed. One touch or as covered by insurance.       blood glucose monitoring test strip    no brand specified    200 strip    Use to test blood sugars 2 times daily or as directed. One touch or as covered by insurance       fluocinonide 0.05 % cream    LIDEX    30 g    apply to hands at bedtime prn       hydrochlorothiazide 25 MG tablet    HYDRODIURIL    90 tablet    Take 1 tablet (25 mg) by mouth every morning       insulin detemir 100 UNIT/ML injection    LEVEMIR FLEXPEN/FLEXTOUCH    18 mL    53 units at bedtime       insulin pen needle 31G X 6 MM    ULTICARE MINI    300 each    Use 10  daily or as directed.       losartan 100 MG tablet    COZAAR    90 tablet    Take 1 tablet (100 mg) by mouth daily       metFORMIN 500 MG 24 hr tablet    GLUCOPHAGE-XR    360 tablet    Take 4 tablets (2,000 mg) by mouth daily (with dinner)       mometasone 0.1 % ointment    ELOCON    45 g    Apply sparingly to affected area twice daily as needed.  Do not apply to face.       sertraline 50 MG tablet    ZOLOFT    90 tablet    Take 1 tablet (50 mg) by mouth daily

## 2017-04-10 NOTE — NURSING NOTE
"Chief Complaint   Patient presents with     Diabetes       Initial /78 (Cuff Size: Adult Large)  Pulse 82  Temp 98.2  F (36.8  C) (Oral)  Ht 5' 5\" (1.651 m)  Wt 217 lb 11.2 oz (98.7 kg)  LMP 01/18/2013  SpO2 97%  BMI 36.23 kg/m2 Estimated body mass index is 36.23 kg/(m^2) as calculated from the following:    Height as of this encounter: 5' 5\" (1.651 m).    Weight as of this encounter: 217 lb 11.2 oz (98.7 kg).  Medication Reconciliation: complete   Geneva Pelaez LPN    "

## 2017-04-10 NOTE — PROGRESS NOTES
SUBJECTIVE:                                                    Lala George is a 46 year old female who presents to clinic today for the following health issues:        Diabetes Follow-up      Patient is checking blood sugars: suppose to be tid but doing daily , is short on strips    Diabetic concerns: None and blood sugar frequently over 200     Symptoms of hypoglycemia (low blood sugar): none     Paresthesias (numbness or burning in feet) or sores: No     Date of last diabetic eye exam: 7/2016     Hyperlipidemia Follow-Up      Rate your low fat/cholesterol diet?: fair    Taking statin?  Yes, no muscle aches from statin    Other lipid medications/supplements?:  none     Hypertension Follow-up      Outpatient blood pressures are being checked at home.  Results are normal .    Low Salt Diet: not monitoring salt         Amount of exercise or physical activity: 2-3 days/week for an average of 15-30 minutes    Problems taking medications regularly: No    Medication side effects: none    Diet: carbohydrate counting and portion control    Problem list and histories reviewed & adjusted, as indicated.  Additional history: as documented    Patient Active Problem List   Diagnosis     Mild persistent asthma     BENIGN HYPERTENSION  BP Goal <140/90     Dyslipidemia     Family history of breast cancer in mother     Atopic dermatitis     Morbid obesity (H)     Abnormal liver enzymes     Type 2 diabetes mellitus with stage 1 chronic kidney disease (H)     High triglycerides     Uncontrolled type 2 diabetes mellitus without complication, with long-term current use of insulin (H)     Past Surgical History:   Procedure Laterality Date     NO HISTORY OF SURGERY         Social History   Substance Use Topics     Smoking status: Never Smoker     Smokeless tobacco: Never Used     Alcohol use Yes      Comment: rare--one to two per week     Family History   Problem Relation Age of Onset     Breast Cancer Mother 30     first diagnosed in  her early 30's     Hypertension Mother      DIABETES Mother      C.AANGEL. Mother      CHF 50s ,pacemaker late 50s, CABG 60s     CPrettyAANGEL. Maternal Aunt      60s         Current Outpatient Prescriptions   Medication Sig Dispense Refill     atorvastatin (LIPITOR) 10 MG tablet Take 1 tablet (10 mg) by mouth daily 90 tablet 3     losartan (COZAAR) 100 MG tablet Take 1 tablet (100 mg) by mouth daily 90 tablet 3     sertraline (ZOLOFT) 50 MG tablet Take 1 tablet (50 mg) by mouth daily 90 tablet 3     hydrochlorothiazide (HYDRODIURIL) 25 MG tablet Take 1 tablet (25 mg) by mouth every morning 90 tablet 3     metFORMIN (GLUCOPHAGE-XR) 500 MG 24 hr tablet Take 4 tablets (2,000 mg) by mouth daily (with dinner) 360 tablet 0     insulin pen needle (ULTICARE MINI) 31G X 6 MM Use 10  daily or as directed. 300 each 1     insulin detemir (LEVEMIR FLEXPEN/FLEXTOUCH) 100 UNIT/ML injection 53 units at bedtime 18 mL 0     mometasone (ELOCON) 0.1 % ointment Apply sparingly to affected area twice daily as needed.  Do not apply to face. 45 g 0     beclomethasone (QVAR) 40 MCG/ACT Inhaler Inhale 2 puffs into the lungs 2 times daily 1 Inhaler 1     albuterol (VENTOLIN HFA) 108 (90 BASE) MCG/ACT inhaler Inhale 1-2 puffs into the lungs every 4 hours as needed 1 Inhaler 2     fluocinonide (LIDEX) 0.05 % cream apply to hands at bedtime prn 30 g 1     blood glucose monitoring (NO BRAND SPECIFIED) test strip Use to test blood sugars 2 times daily or as directed. One touch or as covered by insurance 200 strip 11     blood glucose monitoring (NO BRAND SPECIFIED) meter device kit Use to test blood sugar 2 times daily or as directed. One touch or as covered by insurance. 1 kit 0     albiglutide (TANZEUM) 30 MG pen Inject 30 mg Subcutaneous once a week (Patient not taking: Reported on 4/10/2017) 12 each 1       Reviewed and updated as needed this visit by clinical staff  Tobacco  Allergies  Meds  Med Hx  Surg Hx  Fam Hx  Soc Hx      Reviewed and  "updated as needed this visit by Provider         ROS:  Constitutional, HEENT, cardiovascular, pulmonary, GI, , musculoskeletal, neuro, skin, endocrine and psych systems are negative, except as otherwise noted.    OBJECTIVE:                                                    /78 (Cuff Size: Adult Large)  Pulse 82  Temp 98.2  F (36.8  C) (Oral)  Ht 5' 5\" (1.651 m)  Wt 217 lb 11.2 oz (98.7 kg)  LMP 01/18/2013  SpO2 97%  BMI 36.23 kg/m2  Body mass index is 36.23 kg/(m^2).  GENERAL: healthy, alert and no distress  NECK: no adenopathy, no asymmetry, masses, or scars and thyroid normal to palpation  RESP: lungs clear to auscultation - no rales, rhonchi or wheezes  CV: regular rate and rhythm, normal S1 S2, no S3 or S4, no murmur, click or rub, no peripheral edema and peripheral pulses strong  ABDOMEN: soft, nontender, no hepatosplenomegaly, no masses and bowel sounds normal  MS: no gross musculoskeletal defects noted, no edema    Diagnostic Test Results:  Results for orders placed or performed in visit on 04/10/17   Comprehensive metabolic panel   Result Value Ref Range    Sodium 136 133 - 144 mmol/L    Potassium 4.0 3.4 - 5.3 mmol/L    Chloride 100 94 - 109 mmol/L    Carbon Dioxide 28 20 - 32 mmol/L    Anion Gap 8 3 - 14 mmol/L    Glucose 331 (H) 70 - 99 mg/dL    Urea Nitrogen 13 7 - 30 mg/dL    Creatinine 0.52 0.52 - 1.04 mg/dL    GFR Estimate >90  Non  GFR Calc   >60 mL/min/1.7m2    GFR Estimate If Black >90   GFR Calc   >60 mL/min/1.7m2    Calcium 9.6 8.5 - 10.1 mg/dL    Bilirubin Total 0.5 0.2 - 1.3 mg/dL    Albumin 4.3 3.4 - 5.0 g/dL    Protein Total 8.1 6.8 - 8.8 g/dL    Alkaline Phosphatase 146 40 - 150 U/L    ALT 70 (H) 0 - 50 U/L    AST 35 0 - 45 U/L   Lipid panel reflex to direct LDL   Result Value Ref Range    Cholesterol 145 <200 mg/dL    Triglycerides 164 (H) <150 mg/dL    HDL Cholesterol 46 (L) >49 mg/dL    LDL Cholesterol Calculated 66 <100 mg/dL    Non HDL " Cholesterol 99 <130 mg/dL   Hemoglobin A1c   Result Value Ref Range    Hemoglobin A1C 13.0 (H) 4.3 - 6.0 %        ASSESSMENT/PLAN:                                                      1. Uncontrolled type 2 diabetes mellitus without complication, with long-term current use of insulin (H)  Did not start GLP1 receptor agonist due to cost and insurance issues. Plan start tanzeum.  - atorvastatin (LIPITOR) 10 MG tablet; Take 1 tablet (10 mg) by mouth daily  Dispense: 90 tablet; Refill: 3  - losartan (COZAAR) 100 MG tablet; Take 1 tablet (100 mg) by mouth daily  Dispense: 90 tablet; Refill: 3  - Comprehensive metabolic panel  - Hemoglobin A1c  - hydrochlorothiazide (HYDRODIURIL) 25 MG tablet; Take 1 tablet (25 mg) by mouth every morning  Dispense: 90 tablet; Refill: 3  - DIABETES EDUCATOR REFERRAL    2. Essential hypertension, benign  Reasonable control  - losartan (COZAAR) 100 MG tablet; Take 1 tablet (100 mg) by mouth daily  Dispense: 90 tablet; Refill: 3  - Comprehensive metabolic panel  - hydrochlorothiazide (HYDRODIURIL) 25 MG tablet; Take 1 tablet (25 mg) by mouth every morning  Dispense: 90 tablet; Refill: 3    3. Adjustment disorder with mixed anxiety and depressed mood  stable  - sertraline (ZOLOFT) 50 MG tablet; Take 1 tablet (50 mg) by mouth daily  Dispense: 90 tablet; Refill: 3    4. Hyperlipidemia LDL goal <100  Doing well.  - atorvastatin (LIPITOR) 10 MG tablet; Take 1 tablet (10 mg) by mouth daily  Dispense: 90 tablet; Refill: 3  - Lipid panel reflex to direct LDL    Patient Instructions   Set an appointment with the diabetes educator.    Start the tanzeum.    Consider seeing Dr. Edmonds, our endocrinologist.    Continue your other medications as you are.    Let's meet again in 3 months. We can do nonfasting labs a few days before.      Heidy Rausch MD  [facility]

## 2017-04-10 NOTE — PATIENT INSTRUCTIONS
Set an appointment with the diabetes educator.    Start the tanzeum.    Consider seeing Dr. Edmonds, our endocrinologist.    Continue your other medications as you are.    Let's meet again in 3 months. We can do nonfasting labs a few days before.

## 2017-04-11 LAB
ALBUMIN SERPL-MCNC: 4.3 G/DL (ref 3.4–5)
ALP SERPL-CCNC: 146 U/L (ref 40–150)
ALT SERPL W P-5'-P-CCNC: 70 U/L (ref 0–50)
ANION GAP SERPL CALCULATED.3IONS-SCNC: 8 MMOL/L (ref 3–14)
AST SERPL W P-5'-P-CCNC: 35 U/L (ref 0–45)
BILIRUB SERPL-MCNC: 0.5 MG/DL (ref 0.2–1.3)
BUN SERPL-MCNC: 13 MG/DL (ref 7–30)
CALCIUM SERPL-MCNC: 9.6 MG/DL (ref 8.5–10.1)
CHLORIDE SERPL-SCNC: 100 MMOL/L (ref 94–109)
CHOLEST SERPL-MCNC: 145 MG/DL
CO2 SERPL-SCNC: 28 MMOL/L (ref 20–32)
CREAT SERPL-MCNC: 0.52 MG/DL (ref 0.52–1.04)
GFR SERPL CREATININE-BSD FRML MDRD: ABNORMAL ML/MIN/1.7M2
GLUCOSE SERPL-MCNC: 331 MG/DL (ref 70–99)
HDLC SERPL-MCNC: 46 MG/DL
LDLC SERPL CALC-MCNC: 66 MG/DL
NONHDLC SERPL-MCNC: 99 MG/DL
POTASSIUM SERPL-SCNC: 4 MMOL/L (ref 3.4–5.3)
PROT SERPL-MCNC: 8.1 G/DL (ref 6.8–8.8)
SODIUM SERPL-SCNC: 136 MMOL/L (ref 133–144)
TRIGL SERPL-MCNC: 164 MG/DL

## 2017-04-27 PROBLEM — E78.5 HYPERLIPIDEMIA LDL GOAL <100: Status: ACTIVE | Noted: 2017-04-27

## 2017-05-10 DIAGNOSIS — I10 ESSENTIAL HYPERTENSION, BENIGN: ICD-10-CM

## 2017-05-10 NOTE — TELEPHONE ENCOUNTER
insulin detemir (LEVEMIR FLEXPEN/FLEXTOUCH) 100 UNIT/ML injection         Last Written Prescription Date: 3/21/2017  Last Fill Quantity: 18 ML, # refills: 0  Last Office Visit with G, P or [facility] prescribing provider:  4/10/2017   Next 5 appointments (look out 90 days)     Jul 10, 2017  8:30 AM CDT   SHORT with Heidy Jama MD   [facility]    [address] [place] [address]  [address]  [facility] [address] [phone]                   BP Readings from Last 3 Encounters:   04/10/17 128/78   01/11/17 132/86   07/18/16 134/88     Lab Results   Component Value Date    MICROL 35 05/18/2016     Lab Results   Component Value Date    UMALCR 47.94 05/18/2016     Creatinine   Date Value Ref Range Status   04/10/2017 0.52 0.52 - 1.04 mg/dL Final   ]  GFR Estimate   Date Value Ref Range Status   04/10/2017 >90  Non  GFR Calc   >60 mL/min/1.7m2 Final   01/04/2017 >90  Non  GFR Calc   >60 mL/min/1.7m2 Final   05/18/2016 >90  Non  GFR Calc   >60 mL/min/1.7m2 Final     GFR Estimate If Black   Date Value Ref Range Status   04/10/2017 >90   GFR Calc   >60 mL/min/1.7m2 Final   01/04/2017 >90   GFR Calc   >60 mL/min/1.7m2 Final   05/18/2016 >90   GFR Calc   >60 mL/min/1.7m2 Final     Lab Results   Component Value Date    CHOL 145 04/10/2017     Lab Results   Component Value Date    HDL 46 04/10/2017     Lab Results   Component Value Date    LDL 66 04/10/2017     Lab Results   Component Value Date    TRIG 164 04/10/2017     Lab Results   Component Value Date    CHOLHDLRATIO 3.2 04/01/2015     Lab Results   Component Value Date    AST 35 04/10/2017     Lab Results   Component Value Date    ALT 70 04/10/2017     Lab Results   Component Value Date    A1C 13.0 04/10/2017    A1C 13.3 01/04/2017    A1C 14.6 05/18/2016    A1C 8.1 04/01/2015    A1C 11.6 12/31/2014     Potassium   Date Value Ref  Range Status   04/10/2017 4.0 3.4 - 5.3 mmol/L Final

## 2017-05-11 ENCOUNTER — TELEPHONE (OUTPATIENT)
Dept: NURSING | Facility: CLINIC | Age: 47
End: 2017-05-11

## 2017-05-11 RX ORDER — INSULIN DETEMIR 100 [IU]/ML
INJECTION, SOLUTION SUBCUTANEOUS
Qty: 100 ML | Refills: 3 | Status: SHIPPED | OUTPATIENT
Start: 2017-05-11 | End: 2017-07-10

## 2017-05-11 NOTE — TELEPHONE ENCOUNTER
Type of outreach:  talked to patient  Health Maintenance Due   Topic Date Due     EYE EXAM Q1 YEAR( NO INBASKET)  02/03/2016     ASTHMA ACTION PLAN Q1 YR (NO INBASKET)  05/18/2017     MICROALBUMIN Q1 YEAR( NO INBASKET)  05/18/2017     Called [facility] done on 6/30/16   They will fax over exam  Geneva Pelaez LPN

## 2017-05-11 NOTE — TELEPHONE ENCOUNTER
LM on  to call back.     Would like to confirm if patient is actually using. Looks like PCP switched to Tanzeum on 4/10/2017.    Routing refill request to provider for review/approval because:  Does not look like patient has f/u with Endo. Also is patient only to be on Tanzeum?    Thalia Lopez RN, BSN, PHN

## 2017-05-11 NOTE — LETTER
My Asthma Action Plan  Name: Lala George   YOB: 1970  Date: 5/11/2017   My doctor: Heidy Rausch MD   My clinic: [facility]        My Control Medicine: Beclomethasone (QVar) -  40 mcg ***  My Rescue Medicine: Albuterol (Proair/Ventolin/Proventil) inhaler 108   My Asthma Severity: mild persistent  Avoid your asthma triggers:                GREEN ZONE     Good Control    I feel good    No cough or wheeze    Can work, sleep and play without asthma symptoms       Take your asthma control medicine every day.     1. If exercise triggers your asthma, take your rescue medication    15 minutes before exercise or sports, and    During exercise if you have asthma symptoms  2. Spacer to use with inhaler: If you have a spacer, make sure to use it with your inhaler             YELLOW ZONE     Getting Worse  I have ANY of these:    I do not feel good    Cough or wheeze    Chest feels tight    Wake up at night   1. Keep taking your Green Zone medications  2. Start taking your rescue medicine:    every 20 minutes for up to 1 hour. Then every 4 hours for 24-48 hours.  3. If you stay in the Yellow Zone for more than 12-24 hours, contact your doctor.  4. If you do not return to the Green Zone in 12-24 hours or you get worse, start taking your oral steroid medicine if prescribed by your provider.           RED ZONE     Medical Alert - Get Help  I have ANY of these:    I feel awful    Medicine is not helping    Breathing getting harder    Trouble walking or talking    Nose opens wide to breathe       1. Take your rescue medicine NOW  2. If your provider has prescribed an oral steroid medicine, start taking it NOW  3. Call your doctor NOW  4. If you are still in the Red Zone after 20 minutes and you have not reached your doctor:    Take your rescue medicine again and    Call 911 or go to the emergency room right away    See your regular doctor within 2 weeks of an Emergency Room or Urgent Care visit for  follow-up treatment.        Electronically signed by: Geneva Pelaez, May 11, 2017    Annual Reminders:  Meet with Asthma Educator,  Flu Shot in the Fall, consider Pneumonia Vaccination for patients with asthma (aged 19 and older).    Pharmacy:    [facility]  [id number] [place] - [address] AT CORNER OF \[facility] [place] [facility]""  [facility] - [address] [facility]  [facility] - [address]                    Asthma Triggers  How To Control Things That Make Your Asthma Worse    Triggers are things that make your asthma worse.  Look at the list below to help you find your triggers and what you can do about them.  You can help prevent asthma flare-ups by staying away from your triggers.      Trigger                                                          What you can do   Cigarette Smoke  Tobacco smoke can make asthma worse. Do not allow smoking in your home, car or around you.  Be sure no one smokes at a child s day care or school.  If you smoke, ask your health care provider for ways to help you quit.  Ask family members to quit too.  Ask your health care provider for a referral to Quit Plan to help you quit smoking, or call 8-601-296-PLAN.     Colds, Flu, Bronchitis  These are common triggers of asthma. Wash your hands often.  Don t touch your eyes, nose or mouth.  Get a flu shot every year.     Dust Mites  These are tiny bugs that live in cloth or carpet. They are too small to see. Wash sheets and blankets in hot water every week.   Encase pillows and mattress in dust mite proof covers.  Avoid having carpet if you can. If you have carpet, vacuum weekly.   Use a dust mask and HEPA vacuum.   Pollen and Outdoor Mold  Some people are allergic to trees, grass, or weed pollen, or molds. Try to keep your windows closed.  Limit time out doors when pollen count is high.   Ask you health care provider about taking medicine  during allergy season.     Animal Dander  Some people are allergic to skin flakes, urine or saliva from pets with fur or feathers. Keep pets with fur or feathers out of your home.    If you can t keep the pet outdoors, then keep the pet out of your bedroom.  Keep the bedroom door closed.  Keep pets off cloth furniture and away from stuffed toys.     Mice, Rats, and Cockroaches  Some people are allergic to the waste from these pests.   Cover food and garbage.  Clean up spills and food crumbs.  Store grease in the refrigerator.   Keep food out of the bedroom.   Indoor Mold  This can be a trigger if your home has high moisture. Fix leaking faucets, pipes, or other sources of water.   Clean moldy surfaces.  Dehumidify basement if it is damp and smelly.   Smoke, Strong Odors, and Sprays  These can reduce air quality. Stay away from strong odors and sprays, such as perfume, powder, hair spray, paints, smoke incense, paint, cleaning products, candles and new carpet.   Exercise or Sports  Some people with asthma have this trigger. Be active!  Ask your doctor about taking medicine before sports or exercise to prevent symptoms.    Warm up for 5-10 minutes before and after sports or exercise.     Other Triggers of Asthma  Cold air:  Cover your nose and mouth with a scarf.  Sometimes laughing or crying can be a trigger.  Some medicines and food can trigger asthma.

## 2017-05-14 DIAGNOSIS — E11.9 TYPE 2 DIABETES MELLITUS WITHOUT COMPLICATION, WITHOUT LONG-TERM CURRENT USE OF INSULIN (H): ICD-10-CM

## 2017-05-16 RX ORDER — METFORMIN HCL 500 MG
TABLET, EXTENDED RELEASE 24 HR ORAL
Qty: 360 TABLET | Refills: 3 | Status: SHIPPED | OUTPATIENT
Start: 2017-05-16 | End: 2018-01-24

## 2017-05-16 NOTE — TELEPHONE ENCOUNTER
Patient is due for labs in June. (f/u diabetes labs)  Please sign order for lab if ok.   Refill should last into June.     Cecelia Arroyo, RN  Triage Nurse

## 2017-05-17 NOTE — TELEPHONE ENCOUNTER
insulin pen needle (ULTICARE MINI) 31G X 6 MM      Last Written Prescription Date: 3/21/2017  Last Fill Quantity: 300,  # refills: 1   Last Office Visit with [facility], [facility] or [facility] prescribing provider: 4/10/2017                                         Next 5 appointments (look out 90 days)     Jul 10, 2017  8:30 AM CDT   SHORT with Heidy Jama MD   [facility] ([facility])    [address] [place] [address]  [address] [place] [id number] [phone]

## 2017-05-18 RX ORDER — FLURBIPROFEN SODIUM 0.3 MG/ML
SOLUTION/ DROPS OPHTHALMIC
Qty: 600 EACH | Refills: 1 | Status: SHIPPED | OUTPATIENT
Start: 2017-05-18 | End: 2018-07-25

## 2017-05-18 NOTE — TELEPHONE ENCOUNTER
Prescription approved per [facility] Refill Protocol.  Cecelia Arroyo, RN  Triage Nurse

## 2017-07-09 DIAGNOSIS — F43.23 ADJUSTMENT DISORDER WITH MIXED ANXIETY AND DEPRESSED MOOD: ICD-10-CM

## 2017-07-09 NOTE — TELEPHONE ENCOUNTER
sertraline (ZOLOFT) 50 MG tablet     Last Written Prescription Date: 04/10/2017  Last Fill Quantity: 90, # refills: 3  Last Office Visit with G primary care provider:  04/10/2017   Next 5 appointments (look out 90 days)     Jul 10, 2017  8:30 AM CDT   SHORT with Heidy Jama MD   [facility] ([facility])    [address] [place] [id number] [phone]                   Last PHQ-9 score on record=   PHQ-9 SCORE 1/11/2017   Total Score 6

## 2017-07-10 ENCOUNTER — OFFICE VISIT (OUTPATIENT)
Dept: PEDIATRICS | Facility: CLINIC | Age: 47
End: 2017-07-10
Payer: COMMERCIAL

## 2017-07-10 VITALS
SYSTOLIC BLOOD PRESSURE: 118 MMHG | BODY MASS INDEX: 36.21 KG/M2 | TEMPERATURE: 98.9 F | DIASTOLIC BLOOD PRESSURE: 78 MMHG | WEIGHT: 217.3 LBS | HEIGHT: 65 IN | HEART RATE: 88 BPM

## 2017-07-10 DIAGNOSIS — I10 ESSENTIAL HYPERTENSION: ICD-10-CM

## 2017-07-10 DIAGNOSIS — E78.5 HYPERLIPIDEMIA LDL GOAL <100: ICD-10-CM

## 2017-07-10 PROCEDURE — 84443 ASSAY THYROID STIM HORMONE: CPT | Performed by: INTERNAL MEDICINE

## 2017-07-10 PROCEDURE — 36415 COLL VENOUS BLD VENIPUNCTURE: CPT | Performed by: INTERNAL MEDICINE

## 2017-07-10 PROCEDURE — 99214 OFFICE O/P EST MOD 30 MIN: CPT | Performed by: INTERNAL MEDICINE

## 2017-07-10 RX ORDER — INSULIN GLARGINE 100 [IU]/ML
60 INJECTION, SOLUTION SUBCUTANEOUS DAILY
Qty: 60 ML | Refills: 1 | Status: SHIPPED | OUTPATIENT
Start: 2017-07-10 | End: 2017-09-18

## 2017-07-10 NOTE — NURSING NOTE
"Chief Complaint   Patient presents with     Diabetes       Initial /78 (Cuff Size: Adult Regular)  Pulse 88  Temp 98.9  F (37.2  C) (Oral)  Ht 5' 5\" (1.651 m)  Wt 217 lb 4.8 oz (98.6 kg)  LMP 01/18/2013  BMI 36.16 kg/m2 Estimated body mass index is 36.16 kg/(m^2) as calculated from the following:    Height as of this encounter: 5' 5\" (1.651 m).    Weight as of this encounter: 217 lb 4.8 oz (98.6 kg).  Medication Reconciliation: complete   Geneva Pelaez LPN    "

## 2017-07-10 NOTE — PATIENT INSTRUCTIONS
OK to switch levemir to basaglar and increase dose to 60 units at bedtime.  Increase tanzeum to 50 mg weekly.  Set up an appointment with Dr. Edmonds. You could also see Dr. Edmonds's nurse practitioner, Idalia Boudreaux.    I also think it's a good idea to see the diabetes educator.    Call if any sugars less than 80.    I would like to see you back in 3 months. We can do fasting blood tests before that.

## 2017-07-10 NOTE — MR AVS SNAPSHOT
After Visit Summary   7/10/2017    Lala George    MRN: [id number]           Patient Information     Date Of Birth          1970        Visit Information        Provider Department      7/10/2017 8:30 AM Heidy Jama MD [facility]        Today's Diagnoses     Uncontrolled type 2 diabetes mellitus without complication, with long-term current use of insulin (H)    -  1      Care Instructions    OK to switch levemir to basaglar and increase dose to 60 units at bedtime.  Increase tanzeum to 50 mg weekly.  Set up an appointment with Dr. Edmonds. You could also see Dr. Edmonds's nurse practitioner, Idalia Boudreaux.    I also think it's a good idea to see the diabetes educator.    Call if any sugars less than 80.    I would like to see you back in 3 months. We can do fasting blood tests before that.          Follow-ups after your visit        Who to contact     If you have questions or need follow up information about today's clinic visit or your schedule please contact [facility] directly at [phone].  Normal or non-critical lab and imaging results will be communicated to you by [email or web address], letter or phone within 4 business days after the clinic has received the results. If you do not hear from us within 7 days, please contact the clinic through [email or web address] or phone. If you have a critical or abnormal lab result, we will notify you by phone as soon as possible.  Submit refill requests through T[employer]In Twitter Dashboard or call your pharmacy and they will forward the refill request to us. Please allow 3 business days for your refill to be completed.          Additional Information About Your Visit        MyChart Information     [employer] Twitter Dashboard gives you secure access to your electronic health record. If you see a primary care provider, you can also send messages to your care team and make appointments. If you have questions, please call your primary care clinic.  If you do not have a primary care  "provider, please call [phone] and they will assist you.        Care EveryWhere ID     This is your Care EveryWhere ID. This could be used by other organizations to access your [place] medical records  [id number]        Your Vitals Were     Pulse Temperature Height Last Period BMI (Body Mass Index)       88 98.9  F (37.2  C) (Oral) 5' 5\" (1.651 m) 01/18/2013 36.16 kg/m2        Blood Pressure from Last 3 Encounters:   07/10/17 118/78   04/10/17 128/78   01/11/17 132/86    Weight from Last 3 Encounters:   07/10/17 217 lb 4.8 oz (98.6 kg)   04/10/17 217 lb 11.2 oz (98.7 kg)   01/11/17 223 lb 14.4 oz (101.6 kg)              We Performed the Following     TSH with free T4 reflex          Today's Medication Changes          These changes are accurate as of: 7/10/17  9:48 AM.  If you have any questions, ask your nurse or doctor.               Start taking these medicines.        Dose/Directions    insulin glargine 100 UNIT/ML injection   Used for:  Uncontrolled type 2 diabetes mellitus without complication, with long-term current use of insulin (H)   Started by:  Heidy Jama MD        Dose:  60 Units   Inject 60 Units Subcutaneous daily   Quantity:  60 mL   Refills:  1         These medicines have changed or have updated prescriptions.        Dose/Directions    albiglutide 50 MG pen   Commonly known as:  TANZEUM   This may have changed:    - medication strength  - how much to take   Used for:  Uncontrolled type 2 diabetes mellitus without complication, with long-term current use of insulin (H)   Changed by:  Heidy Jama MD        Dose:  50 mg   Inject 50 mg Subcutaneous once a week   Quantity:  12 each   Refills:  3         Stop taking these medicines if you haven't already. Please contact your care team if you have questions.     LEVEMIR FLEXTOUCH 100 UNIT/ML injection   Generic drug:  insulin detemir   Stopped by:  Heidy Jama MD                Where to get your medicines      These " medications were sent to [facility] - [address]  [address], [facility] [address]     Phone:  [phone]     albiglutide 50 MG pen    insulin glargine 100 UNIT/ML injection                Primary Care Provider Office Phone # Fax #    Heidy AARON MD [place] [phone]       [facility] [address] [place] [address]        Equal Access to Services     [facility] : Hadii aad ku hadasho Soomaali, waaxda luqadaha, qaybta kaalmada adeegyada, waxay idiin hayaan adeeg kharash la'ebenn jose raul. So [facility] [phone].    ATENCIÓN: Si habla español, tiene a stallworth disposición servicios gratuitos de asistencia lingüística. [facility] [phone].    We comply with applicable federal civil rights laws and [place] laws. We do not discriminate on the basis of race, color, national origin, age, disability sex, sexual orientation or gender identity.            Thank you!     Thank you for choosing [facility]  for your care. Our goal is always to provide you with excellent care. Hearing back from our patients is one way we can continue to improve our services. Please take a few minutes to complete the written survey that you may receive in the mail after your visit with us. Thank you!             Your Updated Medication List - Protect others around you: Learn how to safely use, store and throw away your medicines at www.disposemymeds.org.          This list is accurate as of: 7/10/17  9:48 AM.  Always use your most recent med list.                   Brand Name Dispense Instructions for use Diagnosis    albiglutide 50 MG pen    TANZEUM    12 each    Inject 50 mg Subcutaneous once a week    Uncontrolled type 2 diabetes mellitus without complication, with long-term current use of insulin (H)       albuterol 108 (90 BASE) MCG/ACT Inhaler    VENTOLIN HFA    1 Inhaler    Inhale 1-2 puffs into the lungs every 4 hours as needed    Routine  general medical examination at a health care facility       atorvastatin 10 MG tablet    LIPITOR    90 tablet    Take 1 tablet (10 mg) by mouth daily    Uncontrolled type 2 diabetes mellitus without complication, with long-term current use of insulin (H), Hyperlipidemia LDL goal <100       beclomethasone 40 MCG/ACT Inhaler    QVAR    1 Inhaler    Inhale 2 puffs into the lungs 2 times daily    Mild persistent asthma without complication       blood glucose monitoring meter device kit    no brand specified    1 kit    Use to test blood sugar 2 times daily or as directed. One touch or as covered by insurance.    Type 2 diabetes mellitus with stage 1 chronic kidney disease, without long-term current use of insulin (H)       blood glucose monitoring test strip    no brand specified    200 strip    Use to test blood sugars 2 times daily or as directed. One touch or as covered by insurance    Type 2 diabetes mellitus with stage 1 chronic kidney disease, without long-term current use of insulin (H)       fluocinonide 0.05 % cream    LIDEX    30 g    apply to hands at bedtime prn    Atopic dermatitis       hydrochlorothiazide 25 MG tablet    HYDRODIURIL    90 tablet    Take 1 tablet (25 mg) by mouth every morning    Essential hypertension, benign, Uncontrolled type 2 diabetes mellitus without complication, with long-term current use of insulin (H)       insulin glargine 100 UNIT/ML injection     60 mL    Inject 60 Units Subcutaneous daily    Uncontrolled type 2 diabetes mellitus without complication, with long-term current use of insulin (H)       losartan 100 MG tablet    COZAAR    90 tablet    Take 1 tablet (100 mg) by mouth daily    Uncontrolled type 2 diabetes mellitus without complication, with long-term current use of insulin (H), Essential hypertension, benign       metFORMIN 500 MG 24 hr tablet    GLUCOPHAGE-XR    360 tablet    Take 4 tablets by mouth  daily with dinner    Type 2 diabetes mellitus without  complication, without long-term current use of insulin (H)       mometasone 0.1 % ointment    ELOCON    45 g    Apply sparingly to affected area twice daily as needed.  Do not apply to face.    Dermatitis       sertraline 50 MG tablet    ZOLOFT    90 tablet    Take 1 tablet (50 mg) by mouth daily    Adjustment disorder with mixed anxiety and depressed mood       ULTICARE MINI 31G X 6 MM   Generic drug:  insulin pen needle     600 each    Use 10 pen needles daily or as directed.    Type 2 diabetes mellitus, uncontrolled (H)

## 2017-07-10 NOTE — PROGRESS NOTES
SUBJECTIVE:                                                    Lala George is a 47 year old female who presents to clinic today for the following health issues:      Diabetes Follow-up    Patient is checking blood sugars: once daily.  Results are as follows:         bedtime - 300    Diabetic concerns: None and blood sugar frequently over 200     Symptoms of hypoglycemia (low blood sugar): none     Paresthesias (numbness or burning in feet) or sores: No     Date of last diabetic eye exam: 6/30/16    Hyperlipidemia Follow-Up      Rate your low fat/cholesterol diet?: good    Taking statin?  Yes, no muscle aches from statin    Other lipid medications/supplements?:  none    Hypertension Follow-up      Outpatient blood pressures are not being checked.    Low Salt Diet: low salt      Amount of exercise or physical activity: None    Problems taking medications regularly: No    Medication side effects: none    Diet: carbohydrate counting        Problem list and histories reviewed & adjusted, as indicated.  Additional history: as documented    Patient Active Problem List   Diagnosis     Mild persistent asthma     BENIGN HYPERTENSION  BP Goal <140/90     Dyslipidemia     Family history of breast cancer in mother     Atopic dermatitis     Morbid obesity (H)     Abnormal liver enzymes     Type 2 diabetes mellitus with stage 1 chronic kidney disease (H)     High triglycerides     Uncontrolled type 2 diabetes mellitus without complication, with long-term current use of insulin (H)     Hyperlipidemia LDL goal <100     Past Surgical History:   Procedure Laterality Date     NO HISTORY OF SURGERY         Social History   Substance Use Topics     Smoking status: Never Smoker     Smokeless tobacco: Never Used     Alcohol use Yes      Comment: rare--one to two per week     Family History   Problem Relation Age of Onset     Breast Cancer Mother 30     first diagnosed in her early 30's     Hypertension Mother      DIABETES Mother       MIGUEL Mother      CHF 50s ,pacemaker late 50s, CABG 60s     MIGUEL Maternal Aunt      60s         Current Outpatient Prescriptions   Medication Sig Dispense Refill     insulin glargine (BASAGLAR KWIKPEN) 100 UNIT/ML injection Inject 60 Units Subcutaneous daily 60 mL 1     albiglutide (TANZEUM) 50 MG pen Inject 50 mg Subcutaneous once a week 12 each 3     ULTICARE MINI 31G X 6 MM insulin pen needle Use 10 pen needles daily or as directed. 600 each 1     metFORMIN (GLUCOPHAGE-XR) 500 MG 24 hr tablet Take 4 tablets by mouth  daily with dinner 360 tablet 3     atorvastatin (LIPITOR) 10 MG tablet Take 1 tablet (10 mg) by mouth daily 90 tablet 3     losartan (COZAAR) 100 MG tablet Take 1 tablet (100 mg) by mouth daily 90 tablet 3     sertraline (ZOLOFT) 50 MG tablet Take 1 tablet (50 mg) by mouth daily 90 tablet 3     hydrochlorothiazide (HYDRODIURIL) 25 MG tablet Take 1 tablet (25 mg) by mouth every morning 90 tablet 3     blood glucose monitoring (NO BRAND SPECIFIED) test strip Use to test blood sugars 2 times daily or as directed. One touch or as covered by insurance 200 strip 11     blood glucose monitoring (NO BRAND SPECIFIED) meter device kit Use to test blood sugar 2 times daily or as directed. One touch or as covered by insurance. 1 kit 0     mometasone (ELOCON) 0.1 % ointment Apply sparingly to affected area twice daily as needed.  Do not apply to face. 45 g 0     beclomethasone (QVAR) 40 MCG/ACT Inhaler Inhale 2 puffs into the lungs 2 times daily 1 Inhaler 1     albuterol (VENTOLIN HFA) 108 (90 BASE) MCG/ACT inhaler Inhale 1-2 puffs into the lungs every 4 hours as needed 1 Inhaler 2     fluocinonide (LIDEX) 0.05 % cream apply to hands at bedtime prn 30 g 1       Reviewed and updated as needed this visit by clinical staff  Tobacco  Allergies  Meds  Med Hx  Surg Hx  Fam Hx  Soc Hx      Reviewed and updated as needed this visit by Provider         ROS:  Constitutional, HEENT, cardiovascular, pulmonary, GI,  ", musculoskeletal, neuro, skin, endocrine and psych systems are negative, except as otherwise noted.    OBJECTIVE:     /78 (Cuff Size: Adult Regular)  Pulse 88  Temp 98.9  F (37.2  C) (Oral)  Ht 5' 5\" (1.651 m)  Wt 217 lb 4.8 oz (98.6 kg)  LMP 01/18/2013  BMI 36.16 kg/m2  Body mass index is 36.16 kg/(m^2).  GENERAL: healthy, alert and no distress  NECK: no adenopathy, no asymmetry, masses, or scars and thyroid normal to palpation  RESP: lungs clear to auscultation - no rales, rhonchi or wheezes  CV: regular rate and rhythm, normal S1 S2, no S3 or S4, no murmur, click or rub, no peripheral edema and peripheral pulses strong  ABDOMEN: soft, nontender, no hepatosplenomegaly, no masses and bowel sounds normal  MS: no gross musculoskeletal defects noted, no edema    Diagnostic Test Results:  Results for orders placed or performed in visit on 04/10/17   Comprehensive metabolic panel   Result Value Ref Range    Sodium 136 133 - 144 mmol/L    Potassium 4.0 3.4 - 5.3 mmol/L    Chloride 100 94 - 109 mmol/L    Carbon Dioxide 28 20 - 32 mmol/L    Anion Gap 8 3 - 14 mmol/L    Glucose 331 (H) 70 - 99 mg/dL    Urea Nitrogen 13 7 - 30 mg/dL    Creatinine 0.52 0.52 - 1.04 mg/dL    GFR Estimate >90  Non  GFR Calc   >60 mL/min/1.7m2    GFR Estimate If Black >90   GFR Calc   >60 mL/min/1.7m2    Calcium 9.6 8.5 - 10.1 mg/dL    Bilirubin Total 0.5 0.2 - 1.3 mg/dL    Albumin 4.3 3.4 - 5.0 g/dL    Protein Total 8.1 6.8 - 8.8 g/dL    Alkaline Phosphatase 146 40 - 150 U/L    ALT 70 (H) 0 - 50 U/L    AST 35 0 - 45 U/L   Lipid panel reflex to direct LDL   Result Value Ref Range    Cholesterol 145 <200 mg/dL    Triglycerides 164 (H) <150 mg/dL    HDL Cholesterol 46 (L) >49 mg/dL    LDL Cholesterol Calculated 66 <100 mg/dL    Non HDL Cholesterol 99 <130 mg/dL   Hemoglobin A1c   Result Value Ref Range    Hemoglobin A1C 13.0 (H) 4.3 - 6.0 %       ASSESSMENT/PLAN:     1. Uncontrolled type 2 diabetes " mellitus without complication, with long-term current use of insulin (H)  Switch to basaglar due to cost and will increase dose of tanzeum. Have asked her to see endo as we may need to start tid short acting insulin.  - insulin glargine (BASAGLAR KWIKPEN) 100 UNIT/ML injection; Inject 60 Units Subcutaneous daily  Dispense: 60 mL; Refill: 1  - albiglutide (TANZEUM) 50 MG pen; Inject 50 mg Subcutaneous once a week  Dispense: 12 each; Refill: 3  - TSH with free T4 reflex    2. Hyperlipidemia LDL goal <100  Stable. Due for labs next eve    3. Essential hypertension  Good control    Patient Instructions   OK to switch levemir to basaglar and increase dose to 60 units at bedtime.  Increase tanzeum to 50 mg weekly.  Set up an appointment with Dr. Edmonds. You could also see Dr. Edmonds's nurse practitioner, Idalia Boudreaux.    I also think it's a good idea to see the diabetes educator.    Call if any sugars less than 80.    I would like to see you back in 3 months. We can do fasting blood tests before that.      Heidy Rausch MD  [facility]

## 2017-07-11 LAB — TSH SERPL DL<=0.005 MIU/L-ACNC: 2.88 MU/L (ref 0.4–4)

## 2017-07-11 ASSESSMENT — ASTHMA QUESTIONNAIRES: ACT_TOTALSCORE: 24

## 2017-07-12 NOTE — TELEPHONE ENCOUNTER
Duplicate. Year supply was sent on 4/10/2017.    Resent prescription back to pharmacy.    Thalia MARX RN, BSN, PHN  [place] [facility] RN

## 2017-07-12 NOTE — TELEPHONE ENCOUNTER
This is a duplicate, sent for one year in April.   Request is removed.  Cecelia Arroyo, RN  Triage Nurse

## 2017-08-16 DIAGNOSIS — F43.23 ADJUSTMENT DISORDER WITH MIXED ANXIETY AND DEPRESSED MOOD: ICD-10-CM

## 2017-08-16 NOTE — TELEPHONE ENCOUNTER
blood glucose monitoring (NO BRAND SPECIFIED) meter device kit      Last Written Prescription Date: 04/05/2017  Last Fill Quantity: 1 kit,  # refills: 0   Last Office Visit with [facility], [facility] or [facility] prescribing provider: 07/10/2017                                         Next 5 appointments (look out 90 days)     Oct 16, 2017  7:30 AM CDT   SHORT with Heidy Jama MD   [facility]    [address] [place] [address]  [address] [place] [id number] [phone]

## 2017-08-21 RX ORDER — BLOOD-GLUCOSE METER
EACH MISCELLANEOUS
Qty: 1 KIT | Refills: 11 | Status: SHIPPED | OUTPATIENT
Start: 2017-08-21 | End: 2019-03-06

## 2017-08-21 NOTE — TELEPHONE ENCOUNTER
Glucometer not on list of supplies to be approved through RN refill protocol. Routing to provider.

## 2017-08-28 DIAGNOSIS — F43.23 ADJUSTMENT DISORDER WITH MIXED ANXIETY AND DEPRESSED MOOD: ICD-10-CM

## 2017-08-28 NOTE — TELEPHONE ENCOUNTER
Patient calling to report [facility] needs a new rx for Sertraline. We sent a rx on 4/10/17 for a year supply. Patient states [facility] told her she needs a new rx.     Resent rx with remaining refills from 4/10/17 rx.

## 2017-08-28 NOTE — TELEPHONE ENCOUNTER
Patient calling, [facility] needs a new refill for Sertraline, there are no refills on file. Opened new encounter.

## 2017-09-16 ENCOUNTER — HEALTH MAINTENANCE LETTER (OUTPATIENT)
Age: 47
End: 2017-09-16

## 2017-09-18 NOTE — TELEPHONE ENCOUNTER
insulin glargine (BASAGLAR KWIKPEN) 100 UNIT/ML injection         Last Written Prescription Date: 7/10/2017  Last Fill Quantity: 60 ml, # refills: 1  Last Office Visit with [facility], P or [facility] prescribing provider:  7/10/2017   Next 5 appointments (look out 90 days)     Oct 16, 2017  7:30 AM CDT   SHORT with Heidy Jama MD   [facility]    [address] [place] [address]  [address]  [facility] [address] [phone]                   BP Readings from Last 3 Encounters:   07/10/17 118/78   04/10/17 128/78   01/11/17 132/86     Lab Results   Component Value Date    MICROL 35 05/18/2016     Lab Results   Component Value Date    UMALCR 47.94 05/18/2016     Creatinine   Date Value Ref Range Status   04/10/2017 0.52 0.52 - 1.04 mg/dL Final   ]  GFR Estimate   Date Value Ref Range Status   04/10/2017 >90  Non  GFR Calc   >60 mL/min/1.7m2 Final   01/04/2017 >90  Non  GFR Calc   >60 mL/min/1.7m2 Final   05/18/2016 >90  Non  GFR Calc   >60 mL/min/1.7m2 Final     GFR Estimate If Black   Date Value Ref Range Status   04/10/2017 >90   GFR Calc   >60 mL/min/1.7m2 Final   01/04/2017 >90   GFR Calc   >60 mL/min/1.7m2 Final   05/18/2016 >90   GFR Calc   >60 mL/min/1.7m2 Final     Lab Results   Component Value Date    CHOL 145 04/10/2017     Lab Results   Component Value Date    HDL 46 04/10/2017     Lab Results   Component Value Date    LDL 66 04/10/2017     Lab Results   Component Value Date    TRIG 164 04/10/2017     Lab Results   Component Value Date    CHOLHDLRATIO 3.2 04/01/2015     Lab Results   Component Value Date    AST 35 04/10/2017     Lab Results   Component Value Date    ALT 70 04/10/2017     Lab Results   Component Value Date    A1C 13.0 04/10/2017    A1C 13.3 01/04/2017    A1C 14.6 05/18/2016    A1C 8.1 04/01/2015    A1C 11.6 12/31/2014     Potassium   Date Value Ref Range Status    04/10/2017 4.0 3.4 - 5.3 mmol/L Final

## 2017-09-19 RX ORDER — INSULIN GLARGINE 100 [IU]/ML
INJECTION, SOLUTION SUBCUTANEOUS
Qty: 60 ML | Refills: 0 | Status: SHIPPED | OUTPATIENT
Start: 2017-09-19 | End: 2018-03-12

## 2017-09-19 NOTE — TELEPHONE ENCOUNTER
Medication is being filled for 1 time refill only due to:  appt already scheduled   Cat Dougherty RN

## 2018-01-24 ENCOUNTER — OFFICE VISIT (OUTPATIENT)
Dept: PEDIATRICS | Facility: CLINIC | Age: 48
End: 2018-01-24
Payer: COMMERCIAL

## 2018-01-24 ENCOUNTER — TELEPHONE (OUTPATIENT)
Dept: PEDIATRICS | Facility: CLINIC | Age: 48
End: 2018-01-24

## 2018-01-24 VITALS
SYSTOLIC BLOOD PRESSURE: 124 MMHG | BODY MASS INDEX: 37.24 KG/M2 | HEIGHT: 65 IN | WEIGHT: 223.5 LBS | HEART RATE: 90 BPM | DIASTOLIC BLOOD PRESSURE: 76 MMHG | TEMPERATURE: 98.3 F | OXYGEN SATURATION: 95 %

## 2018-01-24 DIAGNOSIS — I10 ESSENTIAL HYPERTENSION, BENIGN: ICD-10-CM

## 2018-01-24 DIAGNOSIS — R06.83 SNORING: ICD-10-CM

## 2018-01-24 DIAGNOSIS — Z13.89 SCREENING FOR DIABETIC PERIPHERAL NEUROPATHY: ICD-10-CM

## 2018-01-24 DIAGNOSIS — E78.5 HYPERLIPIDEMIA LDL GOAL <100: ICD-10-CM

## 2018-01-24 DIAGNOSIS — F43.23 ADJUSTMENT DISORDER WITH MIXED ANXIETY AND DEPRESSED MOOD: ICD-10-CM

## 2018-01-24 LAB — HBA1C MFR BLD: 10.1 % (ref 4.3–6)

## 2018-01-24 PROCEDURE — 99214 OFFICE O/P EST MOD 30 MIN: CPT | Performed by: INTERNAL MEDICINE

## 2018-01-24 PROCEDURE — 82043 UR ALBUMIN QUANTITATIVE: CPT | Performed by: INTERNAL MEDICINE

## 2018-01-24 PROCEDURE — 83036 HEMOGLOBIN GLYCOSYLATED A1C: CPT | Performed by: INTERNAL MEDICINE

## 2018-01-24 PROCEDURE — 36415 COLL VENOUS BLD VENIPUNCTURE: CPT | Performed by: INTERNAL MEDICINE

## 2018-01-24 PROCEDURE — 84443 ASSAY THYROID STIM HORMONE: CPT | Performed by: INTERNAL MEDICINE

## 2018-01-24 PROCEDURE — 80053 COMPREHEN METABOLIC PANEL: CPT | Performed by: INTERNAL MEDICINE

## 2018-01-24 PROCEDURE — 99207 C FOOT EXAM  NO CHARGE: CPT | Performed by: INTERNAL MEDICINE

## 2018-01-24 RX ORDER — METFORMIN HCL 500 MG
2000 TABLET, EXTENDED RELEASE 24 HR ORAL
Qty: 120 TABLET | Refills: 11 | Status: SHIPPED | OUTPATIENT
Start: 2018-01-24 | End: 2018-12-05

## 2018-01-24 RX ORDER — HYDROCHLOROTHIAZIDE 25 MG/1
25 TABLET ORAL EVERY MORNING
Qty: 30 TABLET | Refills: 11 | Status: SHIPPED | OUTPATIENT
Start: 2018-01-24 | End: 2018-12-05

## 2018-01-24 RX ORDER — LOSARTAN POTASSIUM 100 MG/1
100 TABLET ORAL DAILY
Qty: 30 TABLET | Refills: 11 | Status: SHIPPED | OUTPATIENT
Start: 2018-01-24 | End: 2018-12-05

## 2018-01-24 RX ORDER — ATORVASTATIN CALCIUM 10 MG/1
10 TABLET, FILM COATED ORAL DAILY
Qty: 30 TABLET | Refills: 11 | Status: SHIPPED | OUTPATIENT
Start: 2018-01-24 | End: 2018-12-05

## 2018-01-24 ASSESSMENT — ANXIETY QUESTIONNAIRES
7. FEELING AFRAID AS IF SOMETHING AWFUL MIGHT HAPPEN: NOT AT ALL
GAD7 TOTAL SCORE: 0
2. NOT BEING ABLE TO STOP OR CONTROL WORRYING: NOT AT ALL
3. WORRYING TOO MUCH ABOUT DIFFERENT THINGS: NOT AT ALL
6. BECOMING EASILY ANNOYED OR IRRITABLE: NOT AT ALL
1. FEELING NERVOUS, ANXIOUS, OR ON EDGE: NOT AT ALL
5. BEING SO RESTLESS THAT IT IS HARD TO SIT STILL: NOT AT ALL

## 2018-01-24 ASSESSMENT — PATIENT HEALTH QUESTIONNAIRE - PHQ9: 5. POOR APPETITE OR OVEREATING: NOT AT ALL

## 2018-01-24 NOTE — LETTER
My Depression Action Plan  Name: Lala George   Date of Birth 1970  Date: 1/24/2018    My doctor: Heidy Jama   My clinic: [address]  [address]  [facility] [address] [phone]          GREEN    ZONE   Good Control    What it looks like:     Things are going generally well. You have normal up s and down s. You may even feel depressed from time to time, but bad moods usually last less than a day.   What you need to do:  1. Continue to care for yourself (see self care plan)  2. Check your depression survival kit and update it as needed  3. Follow your physician s recommendations including any medication.  4. Do not stop taking medication unless you consult with your physician first.           YELLOW         ZONE Getting Worse    What it looks like:     Depression is starting to interfere with your life.     It may be hard to get out of bed; you may be starting to isolate yourself from others.    Symptoms of depression are starting to last most all day and this has happened for several days.     You may have suicidal thoughts but they are not constant.   What you need to do:     1. Call your care team, your response to treatment will improve if you keep your care team informed of your progress. Yellow periods are signs an adjustment may need to be made.     2. Continue your self-care, even if you have to fake it!    3. Talk to someone in your support network    4. Open up your depression survival kit           RED    ZONE Medical Alert - Get Help    What it looks like:     Depression is seriously interfering with your life.     You may experience these or other symptoms: You can t get out of bed most days, can t work or engage in other necessary activities, you have trouble taking care of basic hygiene, or basic responsibilities, thoughts of suicide or death that will not go away, self-injurious behavior.     What you need to do:  1. Call your  care team and request a same-day appointment. If they are not available (weekends or after hours) call your local crisis line, emergency room or 911.      Electronically signed by: Geneva Pelaez, January 24, 2018    Depression Self Care Plan / Survival Kit    Self-Care for Depression  Here s the deal. Your body and mind are really not as separate as most people think.  What you do and think affects how you feel and how you feel influences what you do and think. This means if you do things that people who feel good do, it will help you feel better.  Sometimes this is all it takes.  There is also a place for medication and therapy depending on how severe your depression is, so be sure to consult with your medical provider and/ or Behavioral Health Consultant if your symptoms are worsening or not improving.     In order to better manage my stress, I will:    Exercise  Get some form of exercise, every day. This will help reduce pain and release endorphins, the  feel good  chemicals in your brain. This is almost as good as taking antidepressants!  This is not the same as joining a gym and then never going! (they count on that by the way ) It can be as simple as just going for a walk or doing some gardening, anything that will get you moving.      Hygiene   Maintain good hygiene (Get out of bed in the morning, Make your bed, Brush your teeth, Take a shower, and Get dressed like you were going to work, even if you are unemployed).  If your clothes don't fit try to get ones that do.    Diet  I will strive to eat foods that are good for me, drink plenty of water, and avoid excessive sugar, caffeine, alcohol, and other mood-altering substances.  Some foods that are helpful in depression are: complex carbohydrates, B vitamins, flaxseed, fish or fish oil, fresh fruits and vegetables.    Psychotherapy  I agree to participate in Individual Therapy (if recommended).    Medication  If prescribed medications, I agree to take them.   Missing doses can result in serious side effects.  I understand that drinking alcohol, or other illicit drug use, may cause potential side effects.  I will not stop my medication abruptly without first discussing it with my provider.    Staying Connected With Others  I will stay in touch with my friends, family members, and my primary care provider/team.    Use your imagination  Be creative.  We all have a creative side; it doesn t matter if it s oil painting, sand castles, or mud pies! This will also kick up the endorphins.    Witness Beauty  (AKA stop and smell the roses) Take a look outside, even in mid-winter. Notice colors, textures. Watch the squirrels and birds.     Service to others  Be of service to others.  There is always someone else in need.  By helping others we can  get out of ourselves  and remember the really important things.  This also provides opportunities for practicing all the other parts of the program.    Humor  Laugh and be silly!  Adjust your TV habits for less news and crime-drama and more comedy.    Control your stress  Try breathing deep, massage therapy, biofeedback, and meditation. Find time to relax each day.     My support system    Clinic Contact:  Phone number:    Contact 1:  Phone number:    Contact 2:  Phone number:    [facility]/:  Phone number:    Therapist:  Phone number:    Local crisis center:    Phone number:    Other community support:  Phone number:

## 2018-01-24 NOTE — PATIENT INSTRUCTIONS
Increase your insulin (basaglar) to 68 units once daily.    Set up an appointment with an endocrinologist. Dr. Edmonds is here.  Idalia Boudreaux is her nurse practitioner, who would also be a great choice.     Set up appt with diabetes education.    Please call the [facility] at [facility] in [place] to set up an appointment.  Ph [phone] Dr. Sammy Wu.

## 2018-01-24 NOTE — TELEPHONE ENCOUNTER
Prior Authorization Retail Medication Request  Medication/Dose: JULIUS Avila on Tanzeum  Diagnosis and ICD code: ICD-10 E11.65  New/Renewal/Insurance Change PA: NEW PA  Previously Tried and Failed Therapies: N/A    Insurance ID (if provided): [id number]  Insurance Phone (if provided): [phone]    Any additional info from fax request:     If you received a fax notification from an outside Pharmacy:  Pharmacy Name:[facility]   Pharmacy #:[phone]  Pharmacy Fax:[phone]      Genevieve Torres CPhT  [facility]   Phone: [phone]  Fax: [phone]

## 2018-01-24 NOTE — NURSING NOTE
"Chief Complaint   Patient presents with     Diabetes       Initial /76 (Cuff Size: Adult Large)  Pulse 90  Temp 98.3  F (36.8  C) (Oral)  Ht 5' 5\" (1.651 m)  Wt 223 lb 8 oz (101.4 kg)  LMP 01/18/2013  SpO2 95%  BMI 37.19 kg/m2 Estimated body mass index is 37.19 kg/(m^2) as calculated from the following:    Height as of this encounter: 5' 5\" (1.651 m).    Weight as of this encounter: 223 lb 8 oz (101.4 kg).  Medication Reconciliation: complete   Geneva Pelaez LPN    "

## 2018-01-24 NOTE — PROGRESS NOTES
SUBJECTIVE:   Lala George is a 47 year old female who presents to clinic today for the following health issues:      Diabetes Follow-up    Patient is checking blood sugars: once daily.  Results are as follows:         9:30 pm before goes to work 210-260    Diabetic concerns: blood sugar frequently over 200     Symptoms of hypoglycemia (low blood sugar): none     Paresthesias (numbness or burning in feet) or sores: No     Date of last diabetic eye exam: due    Hyperlipidemia Follow-Up      Rate your low fat/cholesterol diet?: good    Taking statin?  Yes, no muscle aches from statin    Other lipid medications/supplements?:  none    Hypertension Follow-up      Outpatient blood pressures are not being checked.    Low Salt Diet: low salt    BP Readings from Last 2 Encounters:   07/10/17 118/78   04/10/17 128/78     Hemoglobin A1C (%)   Date Value   04/10/2017 13.0 (H)   01/04/2017 13.3 (H)     LDL Cholesterol Calculated (mg/dL)   Date Value   04/10/2017 66   01/04/2017 110 (H)       Amount of exercise or physical activity: None, work active job at [employer]    Problems taking medications regularly: No    Medication side effects: none    Diet: carbohydrate counting        Depression and Anxiety Follow-Up    Status since last visit: Improved     Other associated symptoms:None    Complicating factors:     Significant life event: No     Current substance abuse: None    PHQ-9 1/11/2017   Total Score 6   Q9: Suicide Ideation Not at all     No flowsheet data found.  In the past two weeks have you had thoughts of suicide or self-harm?  No.    Do you have concerns about your personal safety or the safety of others?   No  PHQ-9  English  PHQ-9   Any Language  JOANNE-7  Suicide Assessment Five-step Evaluation and Treatment (SAFE-T)  Asthma Follow-Up    Was ACT completed today?    Yes    ACT Total Scores 7/10/2017   ACT TOTAL SCORE -   ASTHMA ER VISITS -   ASTHMA HOSPITALIZATIONS -   ACT TOTAL SCORE (Goal Greater than or Equal to  "20) 24   In the past 12 months, how many times did you visit the emergency room for your asthma without being admitted to the hospital? 0   In the past 12 months, how many times were you hospitalized overnight because of your asthma? 0       Recent asthma triggers that patient is dealing with: cold air        Problem list and histories reviewed & adjusted, as indicated.  Additional history: as documented    Patient Active Problem List   Diagnosis     Mild persistent asthma     Essential hypertension     Dyslipidemia     Family history of breast cancer in mother     Atopic dermatitis     Morbid obesity (H)     Abnormal liver enzymes     High triglycerides     Uncontrolled type 2 diabetes mellitus without complication, with long-term current use of insulin (H)     Hyperlipidemia LDL goal <100     Past Surgical History:   Procedure Laterality Date     NO HISTORY OF SURGERY         Social History   Substance Use Topics     Smoking status: Never Smoker     Smokeless tobacco: Never Used     Alcohol use Yes      Comment: rare--one to two per week     Family History   Problem Relation Age of Onset     Breast Cancer Mother 30     first diagnosed in her early 30's     Hypertension Mother      DIABETES Mother      C.A.D. Mother      CHF 50s ,pacemaker late 50s, CABG 60s     C.A.D. Maternal Aunt      60s           Reviewed and updated as needed this visit by clinical staff       Reviewed and updated as needed this visit by Provider         ROS:  Constitutional, HEENT, cardiovascular, pulmonary, GI, , musculoskeletal, neuro, skin, endocrine and psych systems are negative, except as otherwise noted.    OBJECTIVE:     /76 (Cuff Size: Adult Large)  Pulse 90  Temp 98.3  F (36.8  C) (Oral)  Ht 5' 5\" (1.651 m)  Wt 223 lb 8 oz (101.4 kg)  LMP 01/18/2013  SpO2 95%  BMI 37.19 kg/m2  Body mass index is 37.19 kg/(m^2).  GENERAL: healthy, alert and no distress  NECK: no adenopathy, no asymmetry, masses, or scars and thyroid " normal to palpation  RESP: lungs clear to auscultation - no rales, rhonchi or wheezes  CV: regular rate and rhythm, normal S1 S2, no S3 or S4, no murmur, click or rub, no peripheral edema and peripheral pulses strong  ABDOMEN: soft, nontender, no hepatosplenomegaly, no masses and bowel sounds normal  MS: no gross musculoskeletal defects noted, no edema  Diabetic foot exam: normal DP and PT pulses, no trophic changes or ulcerative lesions, normal sensory exam and normal monofilament exam    Diagnostic Test Results:  Results for orders placed or performed in visit on 01/24/18   Albumin Random Urine Quantitative with Creat Ratio   Result Value Ref Range    Creatinine Urine 41 mg/dL    Albumin Urine mg/L 6 mg/L    Albumin Urine mg/g Cr 14.99 0 - 25 mg/g Cr   HEMOGLOBIN A1C   Result Value Ref Range    Hemoglobin A1C 10.1 (H) 4.3 - 6.0 %   TSH with free T4 reflex   Result Value Ref Range    TSH 2.40 0.40 - 4.00 mU/L   Comprehensive metabolic panel   Result Value Ref Range    Sodium 136 133 - 144 mmol/L    Potassium 4.1 3.4 - 5.3 mmol/L    Chloride 101 94 - 109 mmol/L    Carbon Dioxide 29 20 - 32 mmol/L    Anion Gap 6 3 - 14 mmol/L    Glucose 344 (H) 70 - 99 mg/dL    Urea Nitrogen 18 7 - 30 mg/dL    Creatinine 0.73 0.52 - 1.04 mg/dL    GFR Estimate 84 >60 mL/min/1.7m2    GFR Estimate If Black >90 >60 mL/min/1.7m2    Calcium 9.0 8.5 - 10.1 mg/dL    Bilirubin Total 0.5 0.2 - 1.3 mg/dL    Albumin 3.9 3.4 - 5.0 g/dL    Protein Total 7.1 6.8 - 8.8 g/dL    Alkaline Phosphatase 138 40 - 150 U/L    ALT 37 0 - 50 U/L    AST 28 0 - 45 U/L       ASSESSMENT/PLAN:     1. Uncontrolled type 2 diabetes mellitus without complication, with long-term current use of insulin (H)  Inadequate control. Plan increase basaglar and see endocrinology, as well as diabetes education.  - Albumin Random Urine Quantitative with Creat Ratio  - metFORMIN (GLUCOPHAGE-XR) 500 MG 24 hr tablet; Take 4 tablets (2,000 mg) by mouth daily (with dinner)  Dispense:  120 tablet; Refill: 11  - atorvastatin (LIPITOR) 10 MG tablet; Take 1 tablet (10 mg) by mouth daily  Dispense: 30 tablet; Refill: 11  - losartan (COZAAR) 100 MG tablet; Take 1 tablet (100 mg) by mouth daily  Dispense: 30 tablet; Refill: 11  - hydrochlorothiazide (HYDRODIURIL) 25 MG tablet; Take 1 tablet (25 mg) by mouth every morning  Dispense: 30 tablet; Refill: 11  - FOOT EXAM  NO CHARGE [14872.114]  - HEMOGLOBIN A1C  - albiglutide (TANZEUM) 50 MG pen; Inject 50 mg Subcutaneous once a week  Dispense: 12 each; Refill: 3  - DIABETES EDUCATOR REFERRAL  - ENDOCRINOLOGY ADULT REFERRAL  - TSH with free T4 reflex  - Comprehensive metabolic panel    2. Adjustment disorder with mixed anxiety and depressed mood  stable  - sertraline (ZOLOFT) 50 MG tablet; Take 1 tablet (50 mg) by mouth daily  Dispense: 30 tablet; Refill: 5    3. Hyperlipidemia LDL goal <100  Tolerating well  - atorvastatin (LIPITOR) 10 MG tablet; Take 1 tablet (10 mg) by mouth daily  Dispense: 30 tablet; Refill: 11    4. Essential hypertension, benign  Good control  - losartan (COZAAR) 100 MG tablet; Take 1 tablet (100 mg) by mouth daily  Dispense: 30 tablet; Refill: 11  - hydrochlorothiazide (HYDRODIURIL) 25 MG tablet; Take 1 tablet (25 mg) by mouth every morning  Dispense: 30 tablet; Refill: 11    5. Snoring  May be contributing to difficulty losing weight. Stressed the importance of following through with this.  - SLEEP EVALUATION & MANAGEMENT REFERRAL - ADULT -[facility]  [phone] (Age 18 and up); Future    6. Screening for diabetic peripheral neuropathy    - FOOT EXAM  NO CHARGE [09650.114]    Patient Instructions   Increase your insulin (basaglar) to 68 units once daily.    Set up an appointment with an endocrinologist. Dr. Edmonds is here.  Idalia Boudreaux is her nurse practitioner, who would also be a great choice.     Set up appt with diabetes education.    Please call the [facility] at [facility] in [place] to  set up an appointment.   [phone] Dr. Sammy Wu.      Heidy Rausch MD  [facility]

## 2018-01-24 NOTE — MR AVS SNAPSHOT
After Visit Summary   1/24/2018    Lala George    MRN: [id number]           Patient Information     Date Of Birth          1970        Visit Information        Provider Department      1/24/2018 12:30 PM Heidy Jama MD [facility] [place]        Today's Diagnoses     Screening for diabetic peripheral neuropathy    -  1    Adjustment disorder with mixed anxiety and depressed mood        Type 2 diabetes mellitus without complication, without long-term current use of insulin (H)        Uncontrolled type 2 diabetes mellitus without complication, with long-term current use of insulin (H)        Hyperlipidemia LDL goal <100        Essential hypertension, benign        Snoring          Care Instructions    Increase your insulin (basaglar) to 68 units once daily.    Set up an appointment with an endocrinologist. Dr. Edmonds is here.  Idalia Boudreaux is her nurse practitioner, who would also be a great choice.     Set up appt with diabetes education.    Please call the [facility] at [facility] in [place] to set up an appointment.  Ph [phone] Dr. Sammy Wu.          Follow-ups after your visit        Additional Services     DIABETES EDUCATOR REFERRAL       DIABETES SELF MANAGEMENT TRAINING (DSMT)      Your provider has referred you to Diabetes Education: [facility]: Diabetes Education - All [facility] [phone]   [email or web address]     If an urgent visit is needed or A1C is above 12, Care Team to call the Diabetes  Education Team at [phone] or send an In Basket message to the Diabetes Education Pool (P DIAB ED-PATIENT CARE).    A  will call you to make your appointment. If it has been more than 3 business days since your referral was placed, please call the above phone number to schedule.    Type of training and number of hours: Previous Diagnosis: Follow-up DSMT - 2 hours.    Medicare covers: 10 hours of initial  DSMT in 12 month period from the time of first visit, plus 2 hours of follow-up DSMT annually, and additional hours as requested for insulin training.    Diabetes Type: Type 2 - On Insulin             Diabetes Co-Morbidities: obesity               A1C Goal:  <7.0       A1C is: Lab Results       Component                Value               Date                       A1C                      13.0                04/10/2017              Diabetes Education Topics: Comprehensive Knowledge Assessment and Instruction    Special Educational Needs Requiring Individual DSMT: Additional Insulin Training       MEDICAL NUTRITION THERAPY (MNT) for Diabetes    Medical Nutrition Therapy with a Registered Dietitian can be provided in coordination with Diabetes Self-Management Training to assist in achieving optimal diabetes management.    MNT Type and Hours: Previous diagnosis: Annual follow-up MNT - 2 hours                       Medicare will cover: 3 hours initial MNT in 12 month period after first visit, plus 2 hours of follow-up MNT annually    Please be aware that coverage of these services is subject to the terms and limitations of your health insurance plan.  Call member services at your health plan to determine Diabetes Self-Management Training (Codes  &amp; ) and Medical Nutrition Therapy (Codes 38619 & 88270) benefits and ask which blood glucose monitor brands are covered by your plan.  Please bring the following with you to your appointment:    (1)  List of current medications   (2)  List of Blood Glucose Monitor brands that are covered by your insurance plan  (3)  Blood Glucose Monitor and log book  (4)   Food records for the 3 days prior to your visit    The Certified Diabetes Educator may make diabetes medication adjustments per the CDE Protocol and Collaborative Practice Agreement.            ENDOCRINOLOGY ADULT REFERRAL       Your provider has referred you to: [facility]: [facility]  [phone]   [email or web address]  [facility]: [facility] [phone]   [email or web address]  [facility]: Endocrinology and Diabetes Clinic [facility] [phone]   [email or web address]      Please be aware that coverage of these services is subject to the terms and limitations of your health insurance plan.  Call member services at your health plan with any benefit or coverage questions.      Please bring the following to your appointment:    >>   Any x-rays, CTs or MRIs which have been performed.  Contact the facility where they were done to arrange for  prior to your scheduled appointment.    >>   List of current medications   >>   This referral request   >>   Any documents/labs given to you for this referral            SLEEP EVALUATION & MANAGEMENT REFERRAL - [facility]  [phone] (Age 18 and up)       Please be aware that coverage of these services is subject to the terms and limitations of your health insurance plan.  Call member services at your health plan with any benefit or coverage questions.      Please bring the following to your appointment:    >>   List of current medications   >>   This referral request   >>   Any documents/labs given to you for this referral                      Future tests that were ordered for you today     Open Future Orders        Priority Expected Expires Ordered    SLEEP EVALUATION & MANAGEMENT REFERRAL - [facility]  [phone] (Age 18 and up) Routine  1/24/2019 1/24/2018            Who to contact     If you have questions or need follow up information about today's clinic visit or your schedule please [facility] directly at [phone].  Normal or non-critical lab and imaging results will be communicated to you by MyChart, letter or phone within 4 business days after the  "clinic has received the results. If you do not hear from us within 7 days, please contact the clinic through [facility] or phone. If you have a critical or abnormal lab result, we will notify you by phone as soon as possible.  Submit refill requests through [facility] or call your pharmacy and they will forward the refill request to us. Please allow 3 business days for your refill to be completed.          Additional Information About Your Visit        [facility] Information     [facility] gives you secure access to your electronic health record. If you see a primary care provider, you can also send messages to your care team and make appointments. If you have questions, please call your primary care clinic.  If you do not have a primary care provider, please call [phone] and they will assist you.        Care EveryWhere ID     This is your Care EveryWhere ID. This could be used by other organizations to access your [facility] medical records  [id number]        Your Vitals Were     Pulse Temperature Height Last Period Pulse Oximetry BMI (Body Mass Index)    90 98.3  F (36.8  C) (Oral) 5' 5\" (1.651 m) 01/18/2013 95% 37.19 kg/m2       Blood Pressure from Last 3 Encounters:   01/24/18 124/76   07/10/17 118/78   04/10/17 128/78    Weight from Last 3 Encounters:   01/24/18 223 lb 8 oz (101.4 kg)   07/10/17 217 lb 4.8 oz (98.6 kg)   04/10/17 217 lb 11.2 oz (98.7 kg)              We Performed the Following     Albumin Random Urine Quantitative with Creat Ratio     Comprehensive metabolic panel     DEPRESSION ACTION PLAN (DAP)     DIABETES EDUCATOR REFERRAL     ENDOCRINOLOGY ADULT REFERRAL     FOOT EXAM  NO CHARGE [27358.114]     HEMOGLOBIN A1C     TSH with free T4 reflex          Today's Medication Changes          These changes are accurate as of 1/24/18  1:21 PM.  If you have any questions, ask your nurse or doctor.               These medicines have changed or have updated prescriptions.        Dose/Directions    metFORMIN 500 " MG 24 hr tablet   Commonly known as:  GLUCOPHAGE-XR   This may have changed:  See the new instructions.   Used for:  Type 2 diabetes mellitus without complication, without long-term current use of insulin (H)   Changed by:  Heidy Jama MD        Dose:  2000 mg   Take 4 tablets (2,000 mg) by mouth daily (with dinner)   Quantity:  120 tablet   Refills:  11            Where to get your medicines      These medications were sent to [facility] [place] [facility] [place] - [address]   [address] [place] [address]     Phone:  [phone]     albiglutide 50 MG pen    atorvastatin 10 MG tablet    hydrochlorothiazide 25 MG tablet    losartan 100 MG tablet    metFORMIN 500 MG 24 hr tablet    sertraline 50 MG tablet                Primary Care Provider Office Phone # Fax #    Heidy Jama [phone] [address] [place] [address]        Equal Access to Services     [facility] AH: Hadii aad ku hadasho Soomaali, waaxda luqadaha, qaybta kaalmada adeegyada, waxay idiin hayaan kip kharalaura black . So [facility] [phone].    ATENCIÓN: Si habla español, tiene a stallworth disposición servicios gratuitos de asistencia lingüística. [facility] [phone].    We comply with applicable federal civil rights laws and [place] laws. We do not discriminate on the basis of race, color, national origin, age, disability, sex, sexual orientation, or gender identity.            Thank you!     [facility]  [facility] your care. Our goal is always to provide you with excellent care. Hearing back from our patients is one way we can continue to improve our services. Please take a few minutes to complete the written survey that you may receive in the mail after your visit with us. Thank you!             Your Updated Medication List - Protect others around you: Learn how to safely use, store and throw away your medicines at  www.disposemymeds.org.          This list is accurate as of 1/24/18  1:21 PM.  Always use your most recent med list.                   Brand Name Dispense Instructions for use Diagnosis    albiglutide 50 MG pen    TANZEUM    12 each    Inject 50 mg Subcutaneous once a week    Uncontrolled type 2 diabetes mellitus without complication, with long-term current use of insulin (H)       albuterol 108 (90 BASE) MCG/ACT Inhaler    VENTOLIN HFA    1 Inhaler    Inhale 1-2 puffs into the lungs every 4 hours as needed    Routine general medical examination at a health care facility       atorvastatin 10 MG tablet    LIPITOR    30 tablet    Take 1 tablet (10 mg) by mouth daily    Uncontrolled type 2 diabetes mellitus without complication, with long-term current use of insulin (H), Hyperlipidemia LDL goal <100       BASAGLAR 100 UNIT/ML injection     60 mL    Inject subcutaneously 60  units daily    Uncontrolled type 2 diabetes mellitus without complication, with long-term current use of insulin (H)       beclomethasone 40 MCG/ACT Inhaler    QVAR    1 Inhaler    Inhale 2 puffs into the lungs 2 times daily    Mild persistent asthma without complication       * blood glucose monitoring meter device kit    no brand specified    1 kit    Use to test blood sugar 2 times daily or as directed. One touch or as covered by insurance.    Type 2 diabetes mellitus with stage 1 chronic kidney disease, without long-term current use of insulin (H)       * blood glucose monitoring meter device kit     1 kit    Check blood sugar two times daily or as directed    Uncontrolled type 2 diabetes mellitus without complication, with long-term current use of insulin (H)       blood glucose monitoring test strip    no brand specified    200 strip    Use to test blood sugars 2 times daily or as directed. One touch or as covered by insurance    Type 2 diabetes mellitus with stage 1 chronic kidney disease, without long-term current use of insulin (H)        fluocinonide 0.05 % cream    LIDEX    30 g    apply to hands at bedtime prn    Atopic dermatitis       hydrochlorothiazide 25 MG tablet    HYDRODIURIL    30 tablet    Take 1 tablet (25 mg) by mouth every morning    Essential hypertension, benign, Uncontrolled type 2 diabetes mellitus without complication, with long-term current use of insulin (H)       losartan 100 MG tablet    COZAAR    30 tablet    Take 1 tablet (100 mg) by mouth daily    Uncontrolled type 2 diabetes mellitus without complication, with long-term current use of insulin (H), Essential hypertension, benign       metFORMIN 500 MG 24 hr tablet    GLUCOPHAGE-XR    120 tablet    Take 4 tablets (2,000 mg) by mouth daily (with dinner)    Type 2 diabetes mellitus without complication, without long-term current use of insulin (H)       mometasone 0.1 % ointment    ELOCON    45 g    Apply sparingly to affected area twice daily as needed.  Do not apply to face.    Dermatitis       sertraline 50 MG tablet    ZOLOFT    30 tablet    Take 1 tablet (50 mg) by mouth daily    Adjustment disorder with mixed anxiety and depressed mood       ULTICARE MINI 31G X 6 MM   Generic drug:  insulin pen needle     600 each    Use 10 pen needles daily or as directed.    Type 2 diabetes mellitus, uncontrolled (H)       * Notice:  This list has 2 medication(s) that are the same as other medications prescribed for you. Read the directions carefully, and ask your doctor or other care provider to review them with you.

## 2018-01-25 LAB
ALBUMIN SERPL-MCNC: 3.9 G/DL (ref 3.4–5)
ALP SERPL-CCNC: 138 U/L (ref 40–150)
ALT SERPL W P-5'-P-CCNC: 37 U/L (ref 0–50)
ANION GAP SERPL CALCULATED.3IONS-SCNC: 6 MMOL/L (ref 3–14)
AST SERPL W P-5'-P-CCNC: 28 U/L (ref 0–45)
BILIRUB SERPL-MCNC: 0.5 MG/DL (ref 0.2–1.3)
BUN SERPL-MCNC: 18 MG/DL (ref 7–30)
CALCIUM SERPL-MCNC: 9 MG/DL (ref 8.5–10.1)
CHLORIDE SERPL-SCNC: 101 MMOL/L (ref 94–109)
CO2 SERPL-SCNC: 29 MMOL/L (ref 20–32)
CREAT SERPL-MCNC: 0.73 MG/DL (ref 0.52–1.04)
CREAT UR-MCNC: 41 MG/DL
GFR SERPL CREATININE-BSD FRML MDRD: 84 ML/MIN/1.7M2
GLUCOSE SERPL-MCNC: 344 MG/DL (ref 70–99)
MICROALBUMIN UR-MCNC: 6 MG/L
MICROALBUMIN/CREAT UR: 14.99 MG/G CR (ref 0–25)
POTASSIUM SERPL-SCNC: 4.1 MMOL/L (ref 3.4–5.3)
PROT SERPL-MCNC: 7.1 G/DL (ref 6.8–8.8)
SODIUM SERPL-SCNC: 136 MMOL/L (ref 133–144)
TSH SERPL DL<=0.005 MIU/L-ACNC: 2.4 MU/L (ref 0.4–4)

## 2018-01-25 ASSESSMENT — PATIENT HEALTH QUESTIONNAIRE - PHQ9: SUM OF ALL RESPONSES TO PHQ QUESTIONS 1-9: 0

## 2018-01-25 ASSESSMENT — ANXIETY QUESTIONNAIRES: GAD7 TOTAL SCORE: 0

## 2018-02-21 ENCOUNTER — OFFICE VISIT (OUTPATIENT)
Dept: PEDIATRICS | Facility: CLINIC | Age: 48
End: 2018-02-21
Payer: COMMERCIAL

## 2018-02-21 VITALS
BODY MASS INDEX: 36.74 KG/M2 | OXYGEN SATURATION: 98 % | TEMPERATURE: 97.8 F | HEIGHT: 65 IN | WEIGHT: 220.5 LBS | HEART RATE: 84 BPM | DIASTOLIC BLOOD PRESSURE: 78 MMHG | SYSTOLIC BLOOD PRESSURE: 128 MMHG

## 2018-02-21 DIAGNOSIS — L02.219 CELLULITIS AND ABSCESS OF TRUNK: ICD-10-CM

## 2018-02-21 DIAGNOSIS — E78.5 HYPERLIPIDEMIA LDL GOAL <100: ICD-10-CM

## 2018-02-21 DIAGNOSIS — L03.319 CELLULITIS AND ABSCESS OF TRUNK: ICD-10-CM

## 2018-02-21 DIAGNOSIS — Z12.31 ENCOUNTER FOR SCREENING MAMMOGRAM FOR BREAST CANCER: ICD-10-CM

## 2018-02-21 DIAGNOSIS — Z01.419 ENCOUNTER FOR GYNECOLOGICAL EXAMINATION WITHOUT ABNORMAL FINDING: Primary | ICD-10-CM

## 2018-02-21 DIAGNOSIS — Z12.4 CERVICAL CANCER SCREENING: ICD-10-CM

## 2018-02-21 DIAGNOSIS — E66.01 MORBID OBESITY (H): ICD-10-CM

## 2018-02-21 LAB
CHOLEST SERPL-MCNC: 174 MG/DL
HDLC SERPL-MCNC: 62 MG/DL
LDLC SERPL CALC-MCNC: 84 MG/DL
NONHDLC SERPL-MCNC: 112 MG/DL
TRIGL SERPL-MCNC: 138 MG/DL

## 2018-02-21 PROCEDURE — 87624 HPV HI-RISK TYP POOLED RSLT: CPT | Performed by: INTERNAL MEDICINE

## 2018-02-21 PROCEDURE — G0145 SCR C/V CYTO,THINLAYER,RESCR: HCPCS | Performed by: INTERNAL MEDICINE

## 2018-02-21 PROCEDURE — 80061 LIPID PANEL: CPT | Performed by: INTERNAL MEDICINE

## 2018-02-21 PROCEDURE — 99396 PREV VISIT EST AGE 40-64: CPT | Performed by: INTERNAL MEDICINE

## 2018-02-21 PROCEDURE — 99213 OFFICE O/P EST LOW 20 MIN: CPT | Mod: 25 | Performed by: INTERNAL MEDICINE

## 2018-02-21 PROCEDURE — 36415 COLL VENOUS BLD VENIPUNCTURE: CPT | Performed by: INTERNAL MEDICINE

## 2018-02-21 NOTE — PATIENT INSTRUCTIONS
Preventive Health Recommendations  Female Ages 40 to 49    Yearly exam:     See your health care provider every year in order to  1. Review health changes.   2. Discuss preventive care.    3. Review your medicines if your doctor prescribed any.      Get a Pap test every three years (unless you have an abnormal result and your provider advises testing more often).      If you get Pap tests with HPV test, you only need to test every 5 years, unless you have an abnormal result. You do not need a Pap test if your uterus was removed (hysterectomy) and you have not had cancer.      You should be tested each year for STDs (sexually transmitted diseases), if you're at risk.       Ask your doctor if you should have a mammogram.      Have a colonoscopy (test for colon cancer) if someone in your family has had colon cancer or polyps before age 50.       Have a cholesterol test every 5 years.       Have a diabetes test (fasting glucose) after age 45. If you are at risk for diabetes, you should have this test every 3 years.    Shots: Get a flu shot each year. Get a tetanus shot every 10 years.     Nutrition:     Eat at least 5 servings of fruits and vegetables each day.    Eat whole-grain bread, whole-wheat pasta and brown rice instead of white grains and rice.    Talk to your provider about Calcium and Vitamin D.     Lifestyle    Exercise at least 150 minutes a week (an average of 30 minutes a day, 5 days a week). This will help you control your weight and prevent disease.    Limit alcohol to one drink per day.    No smoking.     Wear sunscreen to prevent skin cancer.    See your dentist every six months for an exam and cleaning.    Increase basaglar slightly to 70 units once daily.  After 1 week if your sugars are not below 140, you can increase to 72 units.    Time for your mammogram, you could do either the 3D or regular mammogram.    Take augmentin for 10 days. By next week, if the small abscess doesn't feel much better,  let me know and we can add bactrim.  Consider probiotics between antibiotic doses or yogurt with live cultures.     Plain greek yogurt has low carb and higher protein. (with honey is delicious) Fanicolette or Kimberi.    We can meet again in 3 months.

## 2018-02-21 NOTE — MR AVS SNAPSHOT
After Visit Summary   2/21/2018    Lala George    MRN: [id number]           Patient Information     Date Of Birth          1970        Visit Information        Provider Department      2/21/2018 8:30 AM Heidy Jama MD [facility] (formerly [facility])        Today's Diagnoses     Encounter for gynecological examination without abnormal finding    -  1    Encounter for screening mammogram for breast cancer        Hyperlipidemia LDL goal <100        Cellulitis and abscess of trunk          Care Instructions      Preventive Health Recommendations  Female Ages 40 to 49    Yearly exam:     See your health care provider every year in order to  1. Review health changes.   2. Discuss preventive care.    3. Review your medicines if your doctor prescribed any.      Get a Pap test every three years (unless you have an abnormal result and your provider advises testing more often).      If you get Pap tests with HPV test, you only need to test every 5 years, unless you have an abnormal result. You do not need a Pap test if your uterus was removed (hysterectomy) and you have not had cancer.      You should be tested each year for STDs (sexually transmitted diseases), if you're at risk.       Ask your doctor if you should have a mammogram.      Have a colonoscopy (test for colon cancer) if someone in your family has had colon cancer or polyps before age 50.       Have a cholesterol test every 5 years.       Have a diabetes test (fasting glucose) after age 45. If you are at risk for diabetes, you should have this test every 3 years.    Shots: Get a flu shot each year. Get a tetanus shot every 10 years.     Nutrition:     Eat at least 5 servings of fruits and vegetables each day.    Eat whole-grain bread, whole-wheat pasta and brown rice instead of white grains and rice.    Talk to your provider about Calcium and Vitamin D.     Lifestyle    Exercise at least 150 minutes a week (an average of 30 minutes a day, 5 days  a week). This will help you control your weight and prevent disease.    Limit alcohol to one drink per day.    No smoking.     Wear sunscreen to prevent skin cancer.    See your dentist every six months for an exam and cleaning.    Increase basaglar slightly to 70 units once daily.  After 1 week if your sugars are not below 140, you can increase to 72 units.    Time for your mammogram, you could do either the 3D or regular mammogram.    Take augmentin for 10 days. By next week, if the small abscess doesn't feel much better, let me know and we can add bactrim.  Consider probiotics between antibiotic doses or yogurt with live cultures.     Plain greek yogurt has low carb and higher protein. (with honey is delicious) Fage or Chobani.    We can meet again in 3 months.          Follow-ups after your visit        Follow-up notes from your care team     Return in about 3 months (around 5/21/2018).      Your next 10 appointments already scheduled     Feb 27, 2018 10:00 AM CST   New Sleep Patient with Mir De Jesus MD   [facility] ([facility])    [address]  [place] [address]   [phone]            Mar 06, 2018  8:30 AM CST   New Visit with  DIABETIC ED RESOURCE   [facility] ([facility])    [address]  [address]  [place] [address] [phone]            Mar 06, 2018 10:30 AM CST   New Visit with EDMOND Velez CNP   [facility] ([facility]    [address]  [facility] [place] [facility] [address] [phone]              Future tests that were ordered for you today     Open Future Orders        Priority Expected Expires Ordered    *MA Screening Digital Bilateral Routine  2/21/2019 2/21/2018            Who to contact     If you have questions or need follow up information about today's clinic visit or your schedule please contact [place]  [facility] directly at [phone].  Normal or non-critical lab and imaging results will be communicated to you by MyChart, letter or phone within 4 business days after the clinic has received the results. If you do not hear from us within 7 days, please contact the clinic through Re[employer] or phone. If you have a critical or abnormal lab result, we will notify you by phone as soon as possible.  Submit refill requests through Zoom[employer] or call your pharmacy and they will forward the refill request to us. Please allow 3 business days for your refill to be completed.          Additional Information About Your Visit        [employer] Information     Zoom[employer] gives you secure access to your electronic health record. If you see a primary care provider, you can also send messages to your care team and make appointments. If you have questions, please call your primary care clinic.  If you do not have a primary care provider, please call [phone] and they will assist you.        Care EveryWhere ID     This is your Care EveryWhere ID. This could be used by other organizations to access your [facility] medical records  [id number]        Your Vitals Were     Pulse Temperature Height Last Period Pulse Oximetry BMI (Body Mass Index)    84 97.8  F (36.6  C) (Oral) 5' 4.57\" (1.64 m) 01/18/2013 98% 37.19 kg/m2       Blood Pressure from Last 3 Encounters:   02/21/18 128/78   01/24/18 124/76   07/10/17 118/78    Weight from Last 3 Encounters:   02/21/18 220 lb 8 oz (100 kg)   01/24/18 223 lb 8 oz (101.4 kg)   07/10/17 217 lb 4.8 oz (98.6 kg)              We Performed the Following     Lipid panel reflex to direct LDL Fasting          Today's Medication Changes          These changes are accurate as of 2/21/18  9:31 AM.  If you have any questions, ask your nurse or doctor.               Start taking these medicines.        Dose/Directions    amoxicillin-clavulanate 875-125 MG per tablet   Commonly known as:  AUGMENTIN   Used for:  " Cellulitis and abscess of trunk   Started by:  Heidy Jama MD        Dose:  1 tablet   Take 1 tablet by mouth 2 times daily   Quantity:  20 tablet   Refills:  0         These medicines have changed or have updated prescriptions.        Dose/Directions    BASAGLAR 100 UNIT/ML injection   This may have changed:  See the new instructions.   Used for:  Uncontrolled type 2 diabetes mellitus without complication, with long-term current use of insulin (H)        Inject subcutaneously 60  units daily   Quantity:  60 mL   Refills:  0            Where to get your medicines      These medications were sent to [facility] - [address]   [address], [place] [address]     Phone:  [phone]     amoxicillin-clavulanate 875-125 MG per tablet                Primary Care Provider Office Phone # Fax #    Heidy Jama [phone]       [address] [place] [address]        Equal Access to Services     [facility]: Hadii aad ku hadasho Soomaali, waaxda luqadaha, qaybta kaalmada adeegyada, waxay idiin hayebenn kip hillaralaura black . So [facility] [phone].    ATENCIÓN: Si habla español, tiene a stallworth disposición servicios gratuitos de asistencia lingüística. [facility] [phone].    We comply with applicable federal civil rights laws and [place] laws. We do not discriminate on the basis of race, color, national origin, age, disability, sex, sexual orientation, or gender identity.            Thank you!     [facility]  [facility]. Our goal is always to provide you with excellent care. Hearing back from our patients is one way we can continue to improve our services. Please take a few minutes to complete the written survey that you may receive in the mail after your visit with us. Thank you!             Your Updated Medication List - Protect others around you: Learn how to safely use, store  and throw away your medicines at www.disposemymeds.org.          This list is accurate as of 2/21/18  9:31 AM.  Always use your most recent med list.                   Brand Name Dispense Instructions for use Diagnosis    albiglutide 50 MG pen    TANZEUM    12 each    Inject 50 mg Subcutaneous once a week    Uncontrolled type 2 diabetes mellitus without complication, with long-term current use of insulin (H)       albuterol 108 (90 BASE) MCG/ACT Inhaler    VENTOLIN HFA    1 Inhaler    Inhale 1-2 puffs into the lungs every 4 hours as needed    Routine general medical examination at a health care facility       amoxicillin-clavulanate 875-125 MG per tablet    AUGMENTIN    20 tablet    Take 1 tablet by mouth 2 times daily    Cellulitis and abscess of trunk       atorvastatin 10 MG tablet    LIPITOR    30 tablet    Take 1 tablet (10 mg) by mouth daily    Uncontrolled type 2 diabetes mellitus without complication, with long-term current use of insulin (H), Hyperlipidemia LDL goal <100       BASAGLAR 100 UNIT/ML injection     60 mL    Inject subcutaneously 60  units daily    Uncontrolled type 2 diabetes mellitus without complication, with long-term current use of insulin (H)       beclomethasone 40 MCG/ACT Inhaler    QVAR    1 Inhaler    Inhale 2 puffs into the lungs 2 times daily    Mild persistent asthma without complication       * blood glucose monitoring meter device kit    no brand specified    1 kit    Use to test blood sugar 2 times daily or as directed. One touch or as covered by insurance.    Type 2 diabetes mellitus with stage 1 chronic kidney disease, without long-term current use of insulin (H)       * blood glucose monitoring meter device kit     1 kit    Check blood sugar two times daily or as directed    Uncontrolled type 2 diabetes mellitus without complication, with long-term current use of insulin (H)       blood glucose monitoring test strip    no brand specified    200 strip    Use to test blood  sugars 2 times daily or as directed. One touch or as covered by insurance    Type 2 diabetes mellitus with stage 1 chronic kidney disease, without long-term current use of insulin (H)       hydrochlorothiazide 25 MG tablet    HYDRODIURIL    30 tablet    Take 1 tablet (25 mg) by mouth every morning    Essential hypertension, benign, Uncontrolled type 2 diabetes mellitus without complication, with long-term current use of insulin (H)       losartan 100 MG tablet    COZAAR    30 tablet    Take 1 tablet (100 mg) by mouth daily    Uncontrolled type 2 diabetes mellitus without complication, with long-term current use of insulin (H), Essential hypertension, benign       metFORMIN 500 MG 24 hr tablet    GLUCOPHAGE-XR    120 tablet    Take 4 tablets (2,000 mg) by mouth daily (with dinner)    Uncontrolled type 2 diabetes mellitus without complication, with long-term current use of insulin (H)       sertraline 50 MG tablet    ZOLOFT    30 tablet    Take 1 tablet (50 mg) by mouth daily    Adjustment disorder with mixed anxiety and depressed mood       ULTICARE MINI 31G X 6 MM   Generic drug:  insulin pen needle     600 each    Use 10 pen needles daily or as directed.    Type 2 diabetes mellitus, uncontrolled (H)       * Notice:  This list has 2 medication(s) that are the same as other medications prescribed for you. Read the directions carefully, and ask your doctor or other care provider to review them with you.

## 2018-02-21 NOTE — PROGRESS NOTES
SUBJECTIVE:   CC: Lala George is an 47 year old woman who presents for preventive health visit.     Physical   Annual:     Getting at least 3 servings of Calcium per day::  Yes    Bi-annual eye exam::  Yes    Dental care twice a year::  Yes    Sleep apnea or symptoms of sleep apnea::  Daytime drowsiness    Diet::  Regular (no restrictions)    Frequency of exercise::  None    Taking medications regularly::  Yes    Medication side effects::  None    Additional concerns today::  No            Would like me to look at a lump present for the last few months. A little tender. Has been there for a while but not sure how long. Couple weeks ago she noticed it seemed bigger. Then had some drainage and is better now.    Blood sugars better with higher dose insulin. Lowest sugar was 150, highest in 200's. Feeling much better about things.      Hypertension Follow-up      Outpatient blood pressures are being checked at home.  Results are varies.    Low Salt Diet: low salt    Asthma Follow-Up    Was ACT completed today?    Yes    ACT Total Scores 1/24/2018   ACT TOTAL SCORE -   ASTHMA ER VISITS -   ASTHMA HOSPITALIZATIONS -   ACT TOTAL SCORE (Goal Greater than or Equal to 20) 24   In the past 12 months, how many times did you visit the emergency room for your asthma without being admitted to the hospital? -   In the past 12 months, how many times were you hospitalized overnight because of your asthma? -       Recent asthma triggers that patient is dealing with: cold air        Today's PHQ-2 Score:   PHQ-2 ( 1999 Pfizer) 2/21/2018   Q1: Little interest or pleasure in doing things 0   Q2: Feeling down, depressed or hopeless 0   PHQ-2 Score 0   Q1: Little interest or pleasure in doing things Not at all   Q2: Feeling down, depressed or hopeless Not at all   PHQ-2 Score 0       Abuse: Current or Past(Physical, Sexual or Emotional)- No  Do you feel safe in your environment - Yes    Social History   Substance Use Topics     Smoking  status: Never Smoker     Smokeless tobacco: Never Used     Alcohol use Yes      Comment: rare--one to two per week     Alcohol Use 2/21/2018   If you drink alcohol, do you typically have greater than 3 drinks per day OR greater than 7 drinks per week?   No       Reviewed orders with patient.  Reviewed health maintenance and updated orders accordingly - Yes  BP Readings from Last 3 Encounters:   02/21/18 128/78   01/24/18 124/76   07/10/17 118/78    Wt Readings from Last 3 Encounters:   02/21/18 220 lb 8 oz (100 kg)   01/24/18 223 lb 8 oz (101.4 kg)   07/10/17 217 lb 4.8 oz (98.6 kg)                  Current Outpatient Prescriptions   Medication Sig Dispense Refill     sertraline (ZOLOFT) 50 MG tablet Take 1 tablet (50 mg) by mouth daily 30 tablet 5     metFORMIN (GLUCOPHAGE-XR) 500 MG 24 hr tablet Take 4 tablets (2,000 mg) by mouth daily (with dinner) 120 tablet 11     atorvastatin (LIPITOR) 10 MG tablet Take 1 tablet (10 mg) by mouth daily 30 tablet 11     losartan (COZAAR) 100 MG tablet Take 1 tablet (100 mg) by mouth daily 30 tablet 11     hydrochlorothiazide (HYDRODIURIL) 25 MG tablet Take 1 tablet (25 mg) by mouth every morning 30 tablet 11     albiglutide (TANZEUM) 50 MG pen Inject 50 mg Subcutaneous once a week 12 each 3     insulin glargine (BASAGLAR KWIKPEN) 100 UNIT/ML injection Inject subcutaneously 60  units daily (Patient taking differently: Inject subcutaneously 68 units daily) 60 mL 0     blood glucose monitoring (ONE TOUCH ULTRA 2) meter device kit Check blood sugar two times daily or as directed 1 kit 11     ULTICARE MINI 31G X 6 MM insulin pen needle Use 10 pen needles daily or as directed. 600 each 1     blood glucose monitoring (NO BRAND SPECIFIED) test strip Use to test blood sugars 2 times daily or as directed. One touch or as covered by insurance 200 strip 11     blood glucose monitoring (NO BRAND SPECIFIED) meter device kit Use to test blood sugar 2 times daily or as directed. One touch or as  covered by insurance. 1 kit 0     beclomethasone (QVAR) 40 MCG/ACT Inhaler Inhale 2 puffs into the lungs 2 times daily 1 Inhaler 1     albuterol (VENTOLIN HFA) 108 (90 BASE) MCG/ACT inhaler Inhale 1-2 puffs into the lungs every 4 hours as needed 1 Inhaler 2     Recent Labs   Lab Test  01/24/18   1342  07/10/17   0954  04/10/17   1051  01/04/17   0936  05/18/16   0922   A1C  10.1*   --   13.0*  13.3*  14.6*   LDL   --    --   66  110*  91   HDL   --    --   46*  46*  37*   TRIG   --    --   164*  244*  353*   ALT  37   --   70*  104*  152*   CR  0.73   --   0.52  0.62  0.55   GFRESTIMATED  84   --   >90  Non  GFR Calc    >90  Non  GFR Calc    >90  Non  GFR Calc     GFRESTBLACK  >90   --   >90   GFR Calc    >90   GFR Calc    >90   GFR Calc     POTASSIUM  4.1   --   4.0  3.9  4.0   TSH  2.40  2.88   --    --   3.09        Patient under age 50, mutual decision reflected in health maintenance.      Pertinent mammograms are reviewed under the imaging tab.  History of abnormal Pap smear:   NO - age 30- 65 PAP every 3 years recommended  Last 3 Pap Results:   PAP (no units)   Date Value   07/14/2014 NIL   06/22/2011 NIL   02/03/2010 NIL       Reviewed and updated as needed this visit by clinical staff  Tobacco  Allergies  Meds  Med Hx  Surg Hx  Fam Hx  Soc Hx        Reviewed and updated as needed this visit by Provider            Review of Systems  C: NEGATIVE for fever, chills, change in weight  I: NEGATIVE for worrisome rashes, moles or lesions  E: NEGATIVE for vision changes or irritation  ENT: NEGATIVE for ear, mouth and throat problems  R: NEGATIVE for significant cough or SOB  B: NEGATIVE for masses, tenderness or discharge  CV: NEGATIVE for chest pain, palpitations or peripheral edema  GI: NEGATIVE for nausea, abdominal pain, heartburn, or change in bowel habits  : NEGATIVE for unusual urinary or vaginal symptoms.  "No vaginal bleeding.  M: NEGATIVE for significant arthralgias or myalgia  N: NEGATIVE for weakness, dizziness or paresthesias  P: NEGATIVE for changes in mood or affect      OBJECTIVE:   /78 (Cuff Size: Adult Large)  Pulse 84  Temp 97.8  F (36.6  C) (Oral)  Ht 5' 4.57\" (1.64 m)  Wt 220 lb 8 oz (100 kg)  LMP 01/18/2013  SpO2 98%  BMI 37.19 kg/m2  Physical Exam  GENERAL: healthy, alert and no distress  EYES: Eyes grossly normal to inspection, PERRL and conjunctivae and sclerae normal  HENT: ear canals and TM's normal, nose and mouth without ulcers or lesions  NECK: no adenopathy, no asymmetry, masses, or scars and thyroid normal to palpation  RESP: lungs clear to auscultation - no rales, rhonchi or wheezes  BREAST: normal without masses, tenderness or nipple discharge and no palpable axillary masses or adenopathy  CV: regular rate and rhythm, normal S1 S2, no S3 or S4, no murmur, click or rub, no peripheral edema and peripheral pulses strong  ABDOMEN: soft, nontender, no hepatosplenomegaly, no masses and bowel sounds normal   (female): normal female external genitalia, normal urethral meatus, vaginal mucosa pink, moist, well rugated, and normal cervix/adnexa/uterus without masses or discharge. 1 cm soft, fluctuant mass with pustule noted. Not open. No surrounding redness. Minimal tenderness noted on R lower buttock.  MS: no gross musculoskeletal defects noted, no edema  SKIN: no suspicious lesions or rashes  NEURO: Normal strength and tone, mentation intact and speech normal  PSYCH: mentation appears normal, affect normal/bright    ASSESSMENT/PLAN:   1. Encounter for gynecological examination without abnormal finding      2. Cellulitis and abscess of trunk  Currently on amox for dental prophylaxis after root canal. Will switch to augmentin for cellulitis. Discussed double coverage with addition of bactrim, but abscess present for months and occasionally almost clear. Will try single agent first. If " "worsens, she will let me know. If not better, plan add bactrim.  - amoxicillin-clavulanate (AUGMENTIN) 875-125 MG per tablet; Take 1 tablet by mouth 2 times daily  Dispense: 20 tablet; Refill: 0    3. Uncontrolled type 2 diabetes mellitus without complication, with long-term current use of insulin (H)  Improved. Plan small increase in insulin dose    4. Hyperlipidemia LDL goal <100    - Lipid panel reflex to direct LDL Fasting    5. Morbid obesity (H)  Discussed exercise/diet    6. Encounter for screening mammogram for breast cancer    - *MA Screening Digital Bilateral; Future    7. Cervical cancer screening    - Pap imaged thin layer screen with HPV - recommended age 30 - 65 years (select HPV order below)  - HPV High Risk Types DNA Cervical    COUNSELING:  Reviewed preventive health counseling, as reflected in patient instructions     reports that she has never smoked. She has never used smokeless tobacco.    Estimated body mass index is 37.19 kg/(m^2) as calculated from the following:    Height as of this encounter: 5' 4.57\" (1.64 m).    Weight as of this encounter: 220 lb 8 oz (100 kg).   Weight management plan: Patient referred to endocrine and/or weight management specialty    Counseling Resources:  ATP IV Guidelines  Pooled Cohorts Equation Calculator  Breast Cancer Risk Calculator  FRAX Risk Assessment  ICSI Preventive Guidelines  Dietary Guidelines for Americans, 2010  USDA's MyPlate  ASA Prophylaxis  Lung CA Screening    Heidy Rausch MD  [facility]  "

## 2018-02-22 ASSESSMENT — ASTHMA QUESTIONNAIRES: ACT_TOTALSCORE: 25

## 2018-02-24 ENCOUNTER — HEALTH MAINTENANCE LETTER (OUTPATIENT)
Age: 48
End: 2018-02-24

## 2018-02-24 LAB
COPATH REPORT: NORMAL
PAP: NORMAL

## 2018-02-27 ENCOUNTER — OFFICE VISIT (OUTPATIENT)
Dept: SLEEP MEDICINE | Facility: CLINIC | Age: 48
End: 2018-02-27
Payer: COMMERCIAL

## 2018-02-27 VITALS
BODY MASS INDEX: 36.65 KG/M2 | RESPIRATION RATE: 15 BRPM | DIASTOLIC BLOOD PRESSURE: 90 MMHG | SYSTOLIC BLOOD PRESSURE: 135 MMHG | WEIGHT: 220 LBS | HEIGHT: 65 IN | HEART RATE: 93 BPM | OXYGEN SATURATION: 95 %

## 2018-02-27 DIAGNOSIS — G47.10 HYPERSOMNOLENCE: Primary | ICD-10-CM

## 2018-02-27 DIAGNOSIS — E66.9 OBESITY (BMI 30-39.9): ICD-10-CM

## 2018-02-27 DIAGNOSIS — R06.83 SNORING: ICD-10-CM

## 2018-02-27 LAB
FINAL DIAGNOSIS: NORMAL
HPV HR 12 DNA CVX QL NAA+PROBE: NEGATIVE
HPV16 DNA SPEC QL NAA+PROBE: NEGATIVE
HPV18 DNA SPEC QL NAA+PROBE: NEGATIVE
SPECIMEN DESCRIPTION: NORMAL
SPECIMEN SOURCE CVX/VAG CYTO: NORMAL

## 2018-02-27 PROCEDURE — 99204 OFFICE O/P NEW MOD 45 MIN: CPT | Performed by: INTERNAL MEDICINE

## 2018-02-27 NOTE — MR AVS SNAPSHOT
"              After Visit Summary   2/27/2018    Lala George    MRN: [id number]           Patient Information     Date Of Birth          1970        Visit Information        Provider Department      2/27/2018 10:00 AM Mir De Jesus MD [facility] [place]        Today's Diagnoses     Hypersomnolence    -  1      Care Instructions    MY TREATMENT INFORMATION FOR SLEEP DISTURBANCE-  Lala George    DOCTOR : Mir De Jesus  SLEEP CENTER :  [place]  MY CONTACT NUMBER:[phone]        If I haven't had a sleep study yet, what can I expect?  A personal story from [facility]  https://www.4moms.com/watch?v=AxPLmlRpnCs    Am I having a home sleep study?  Here is a video in case you get home and want to make sure you have done it correctly  https://www.4moms.com/watch?v=GDG8T2cAod7&feature=youtu.be    Suspected sleep apnea: Sleep study ordered.    Follow up in sleep clinic 1-2 weeks after sleep study to discuss results of sleep study and treatment options.    Patient was advised not to drive if drowsy or sleepy.    Frequently asked questions:  1. What is Obstructive Sleep Apnea (LUCIO)? LUCIO is the most common type of sleep apnea. Apnea literally means, \"without breath.\" It is characterized by repetitive pauses in breathing, despite continued effort to breathe, and is usually associated with a reduction in blood oxygen saturation. Apneas can last 10 to over 60 seconds. It is caused by narrowing or collapse of the upper airway as muscles relax during sleep. Severity of sleep apnea is determined by frequency of breathing events and their effect on your sleep and oxygen levels determined during sleep testing.   2. What are the consequences of LUCIO? Symptoms include: daytime sleepiness- possibly increasing the risk of falling asleep while driving, unrefreshing/restless sleep, snoring, insomnia, waking frequently to urinate, waking with heartburn or reflux, reduced concentration and memory, and " morning headaches. Other health consequences may include development of high blood pressure and other cardiovascular disease in persons who are susceptible. Untreated LUCIO  can contribute to heart disease, stroke and diabetes.   3. What are the treatment options? In most situations, sleep apnea is a lifelong disease that must be managed with daily therapy. Medications are not effective for sleep apnea and surgery is generally not performed until other therapies have been tried. Therapy is usually tailored to the individual patient based on many factors including your wishes as well as severity of sleep apnea and severity of obesity. Continuous Positive Airway (CPAP) is the most reliable treatment. An oral device to hold your jaw forward is usually the next most reliable option. Other options include postioning devices (to keep you off your back), weight loss, and surgery including a tongue pacing device. There is more detail about some of these options below.    Central sleep apnea is a disorder in which your breathing repeatedly stops and starts during sleep.  Central sleep apnea occurs because your brain doesn't send proper signals to the muscles that control your breathing. This condition is different from obstructive sleep apnea, in which you can't breathe normally because of upper airway obstruction. Central sleep apnea is less common than obstructive sleep apnea.  Central sleep apnea may occur as a result of other conditions, such as heart failure and stroke. Sleeping at a high altitude and pain medications also may cause central sleep apnea.        1. CPAP-  WHAT DOES IT DO AND HOW CAN I LEARN TO WEAR IT?                               BEFORE I START, CAN I WATCH A MOVIE TO GET A PLAN ON HOW TO USE CPAP?  https://www.[employer].com/watch?j=i0X81zk035S      Continuous positive airway pressure, or CPAP, is the most effective treatment for obstructive sleep apnea. It works by blowing room air, through a mask, to hold  "your throat open. A decision to use CPAP is a major step forward in the pursuit of a healthier life. The successful use of CPAP will help you breathe easier, sleep better and live healthier. You can choose CPAP equipment from any durable medical equipment provider that meets your needs.  Using CPAP can be a positive experience if you keep these denise points in mind:  1. Commitment  CPAP is not a quick fix for your problem. It involves a long-term commitment to improve your sleep and your health.    2. Communication  Stay in close communication with both your sleep doctor and your CPAP supplier. Ask lots of questions and seek help when you need it.    3. Consistency  Use CPAP all night, every night and for every nap. You will receive the maximum health benefits from CPAP when you use it every time that you sleep. This will also make it easier for your body to adjust to the treatment.    4. Correction  The first machine and mask that you try may not be the best ones for you. Work with your sleep doctor and your CPAP supplier to make corrections to your equipment selection. Ask about trying a different type of machine or mask if you have ongoing problems. Make sure that your mask is a good fit and learn to use your equipment properly.    5. Challenge  Tell a family member or close friend to ask you each morning if you used your CPAP the previous night. Have someone to challenge you to give it your best effort.    6. Connection   Your adjustment to CPAP will be easier if you are able to connect with others who use the same treatment. Ask your sleep doctor if there is a support group in your area for people who have sleep apnea, or look for one on the Internet.  7. Comfort   Increase your level of comfort by using a saline spray, decongestant or heated humidifier if CPAP irritates your nose, mouth or throat. Use your unit's \"ramp\" setting to slowly get used to the air pressure level. There may be soft pads you can buy that " will fit over your mask straps. Look on www.CPAP.com for accessories that can help make CPAP use more comfortable.  8. Cleaning   Clean your mask, tubing and headgear on a regular basis. Put this time in your schedule so that you don't forget to do it. Check and replace the filters for your CPAP unit and humidifier.    9. Completion   Although you are never finished with CPAP therapy, you should reward yourself by celebrating the completion of your first month of treatment. Expect this first month to be your hardest period of adjustment. It will involve some trial and error as you find the machine, mask and pressure settings that are right for you.    10. Continuation  After your first month of treatment, continue to make a daily commitment to use your CPAP all night, every night and for every nap.    CPAP-Tips to starting with success:  Begin using your CPAP for short periods of time during the day while you watch TV or read.    Use CPAP every night and for every nap. Using it less often reduces the health benefits and makes it harder for your body to get used to it.    Make small adjustments to your mask, tubing, straps and headgear until you get the right fit. Tightening the mask may actually worsen the leak.  If it leaves significant marks on your face or irritates the bridge of your nose, it may not be the best mask for you.  Speak with the person who supplied the mask and consider trying other masks. Insurances will allow you to try different masks during the first month of starting CPAP.  Insurance also covers a new mask, hose and filter about every 6 months.    Use a saline nasal spray to ease mild nasal congestion. Neti-Pot or saline nasal rinses may also help. Nasal gel sprays can help reduce nasal dryness.  Biotene mouthwash can be helpful to protect your teeth if you experience frequent dry mouth.  Dry mouth may be a sign of air escaping out of your mouth or out of the mask in the case of a full face  mask.  Speak with your provider if you expect that is the case.     Take a nasal decongestant to relieve more severe nasal or sinus congestion.  Do not use Afrin (oxymetazoline) nasal spray more than 3 days in a row.  Speak with your sleep doctor if your nasal congestion is chronic.    Use a heated humidifier that fits your CPAP model to enhance your breathing comfort. Adjust the heat setting up if you get a dry nose or throat, down if you get condensation in the hose or mask.  Position the CPAP lower than you so that any condensation in the hose drains back into the machine rather than towards the mask.    Try a system that uses nasal pillows if traditional masks give you problems.    Clean your mask, tubing and headgear once a week. Make sure the equipment dries fully.    Regularly check and replace the filters for your CPAP unit and humidifier.    Work closely with your sleep provider and your CPAP supplier to make sure that you have the machine, mask and air pressure setting that works best for you. It is better to stop using it and call your provider to solve problems than to lay awake all night frustrated with the device.    BESIDES CPAP, WHAT OTHER THERAPIES ARE THERE?      Positioning Device  Positioning devices are generally used when sleep apnea is mild and only occurs on your back.This example shows a pillow that straps around the waist. It may be appropriate for those whose sleep study shows milder sleep apnea that occurs primarily when lying flat on one's back. Preliminary studies have shown benefit but effectiveness at home may need to be verified by a home sleep test. These devices are generally not covered by medical insurance.                      Oral Appliance  What is oral appliance therapy?  An oral appliance is a small acrylic device that fits over the upper and lower teeth or tongue (similar to an orthodontic retainer or a mouth guard). This device slightly advances the lower jaw or tongue,  which moves the base of the tongue forward, opens the airway, improves breathing and can effectively treat snoring and obstructive sleep apnea sleep apnea. The appliance is fabricated and customized by a qualified dentist with experience in treating snoring and sleep apnea. Oral appliances are usually well tolerated and have relatively high compliance by patients1, 2, 3.  When is an oral appliance indicated?  Oral appliance therapy is recommended as a first-line treatment for patients with primary snoring, mild sleep apnea, and for patients with moderate sleep apnea who prefer appliance therapy to use of CPAP4, 5. Severity of sleep apnea is determined by sleep testing and is based on the number of respiratory events per hour of sleep.   How successful is oral appliance therapy?  The success rate of oral appliance therapy in patients with mild sleep apnea is 75-80% while in patients with moderate sleep apnea it is 50-70%. The chance of success in patients with severe sleep apnea is 40-50%. The research also shows that oral appliances have a beneficial effect on the cardiovascular health of LUCIO patients at the same magnitude as CPAP therapy7.  Oral appliances should be a second-line treatment in cases of severe sleep apnea, but if not completely successful then a combination therapy utilizing CPAP plus oral appliance therapy may be effective. Oral appliances tend to be effective in a broad range of patients although studies show that the patients who have the highest success are females, younger patients, those with milder disease, and less severe obesity. 3, 6.   The chances of success are lower in patients who have more severe LUCIO, are older, and those who are morbidly obese.     Example of an oral appliance   Finding a dentist that practices dental sleep medicine  Specific training is available through the American Academy of Dental Sleep Medicine for dentists interested in working in the field of sleep. To find a  dentist who is educated in the field of sleep and the use of oral appliances, near you, visit the Web site of the American Academy of Dental Sleep Medicine; also see   http://www.accpstorage.org/newOrganization/patients/oralAppliances.pdf  To search for a dentist certified in these practices:  Http://aadsm.org/FindADentist.aspx?1  1. Tawanna, et al. Objectively measured vs self-reported compliance during oral appliance therapy for sleep-disordered breathing. Chest 2013; 144(5): 2928-1690.  2. Hui, et al. Objective measurement of compliance during oral appliance therapy for sleep-disordered breathing. Thorax 2013; 68(1): 91-96.  3. Elkin et al. Mandibular advancement devices in 620 men and women with LUCIO and snoring: tolerability and predictors of treatment success. Chest 2004; 125: 0152-5692.  4. Chary, et al. Oral appliances for snoring and LUCIO: a review. Sleep 2006; 29: 244-262.  5. Precious et al. Oral appliance treatment for LUCIO: an update. J Clin Sleep Med 2014; 10(2): 215-227.  6. Monroe, et al. Predictors of OSAH treatment outcome. J Dent Res 2007; 86: 9813-3936.    Nasal Valves                 Nasal valves may not be effective if you have frequent nasal congestion or have difficulty breathing through your nose. They may be an option for mild apnea if other options are not well tolerated. The efficacy of these devices is generally less than CPAP or oral appliances.      Weight Loss:    Weight management is a personal decision.  If you are interested in exploring weight loss strategies, the following discussion covers the impact on weight loss on sleep apnea and the approaches that may be successful.    Weight loss decreases severity of sleep apnea in most people with obesity. For those with mild obesity who have developed snoring with weight gain, even 15-30 pound weight loss can improve and occasionally eliminate sleep apnea.  Structured and life-long dietary and health habits are  necessary to lose weight and keep healthier weight levels.     Though there may be significant health benefits from weight loss, long-term weight loss is very difficult to achieve- studies show success with dietary management in less than 10% of people. In addition, substantial weight loss may require years of dietary control and may be difficult if patients have severe obesity. In these cases, surgical management may be considered.  Finally, older individuals who have tolerated obesity without health complications may be less likely to benefit from weight loss strategies.    Your BMI is Body mass index is 36.61 kg/(m^2).  Body mass index (BMI) is one way to tell whether you are at a healthy weight, overweight, or obese. It measures your weight in relation to your height.  A BMI of 18.5 to 24.9 is in the healthy range. A person with a BMI of 25 to 29.9 is considered overweight, and someone with a BMI of 30 or greater is considered obese. More than two-thirds of American adults are considered overweight or obese.  Being overweight or obese increases the risk for further weight gain. Excess weight may lead to heart disease and diabetes.  Creating and following plans for healthy eating and physical activity may help you improve your health.  Weight control is part of healthy lifestyle and includes exercise, emotional health, and healthy eating habits. Careful eating habits lifelong are the mainstay of weight control. Though there are significant health benefits from weight loss, long-term weight loss with diet alone may be very difficult to achieve- studies show long-term success with dietary management in less than 10% of people. Attaining a healthy weight may be especially difficult to achieve in those with severe obesity. In some cases, medications, devices and surgical management might be considered.  What can you do?  If you are overweight or obese and are interested in methods for weight loss, you should discuss  this with your provider.     Consider reducing daily calorie intake by 500 calories.     Keep a food journal.     Avoiding skipping meals, consider cutting portions instead.    Diet combined with exercise helps maintain muscle while optimizing fat loss. Strength training is particularly important for building and maintaining muscle mass. Exercise helps reduce stress, increase energy, and improves fitness. Increasing exercise without diet control, however, may not burn enough calories to loose weight.       Start walking three days a week 10-20 minutes at a time    Work towards walking thirty minutes five days a week     Eventually, increase the speed of your walking for 1-2 minutes at time    In addition, we recommend that you review healthy lifestyles and methods for weight loss available through the National Institutes of Health patient information sites:  http://win.niddk.nih.gov/publications/index.htm    And look into health and wellness programs that may be available through your health insurance provider, employer, local community center, or winter club.    Weight management plan: Patient was referred to their PCP to discuss a diet and exercise plan.    Surgery:    Upper Airway Surgery for LUCIO  Surgery for LUCIO is a second-line treatment option in the management of sleep apnea.  Surgery should be considered for patients who are having a difficult time tolerating CPAP.    Surgery for LUCIO is directed at areas that are responsible for narrowing or complete obstruction of the airway during sleep.  There are a wide range of procedures available to enlarge and/or stabilize the airway to prevent blockage of breathing in the three major areas where it can occur: the palate, tongue, and nasal regions.  Successful surgical treatment depends on the accurate identification of the factors responsible for obstructive sleep apnea in each person.  A personalized approach is required because there is no single treatment that  works well for everyone.  Because of anatomic variation, consultation with an examination by a sleep surgeon is a critical first step in determining what surgical options are best for each patient.  In some cases, examination during sedation may be recommended in order to guide the selection of procedures.  Patients will be counseled about risks and benefits as well as the typical recovery course after surgery. Surgery is typically not a cure for a person s LUCIO.  However, surgery will often significantly improve one s LUCIO severity (termed  success rate ).  Even in the absence of a cure, surgery will decrease the cardiovascular risk associated with OSA7; improve overall quality of life8 (sleepiness, functionality, sleep quality, etc).          Palate Procedures:  Patients with LUCIO often have narrowing of their airway in the region of their tonsils and uvula.  The goals of palate procedures are to widen the airway in this region as well as to help the tissues resist collapse.  Modern palate procedure techniques focus on tissue conservation and soft tissue rearrangement, rather than tissue removal.  Often the uvula is preserved in this procedure. Residual sleep apnea is common in patient after pharyngoplasty with an average reduction in sleep apnea events of 33%2.      Tongue Procedures:  While patients are awake, the muscles that surround the throat are active and keep this region open for breathing. These muscles relax during sleep, allowing the tongue and other structures to collapse and block breathing.  There are several different tongue procedures available.  Selection of a tongue base procedure depends on characteristics seen on physical exam.  Generally, procedures are aimed at removing bulky tissues in this area or preventing the back of the tongue from falling back during sleep.  Success rates for tongue surgery range from 50-62%3.    Hypoglossal Nerve Stimulation:  Hypoglossal nerve stimulation has recently  received approval from the [place] Food and Drug Administration for the treatment of obstructive sleep apnea.  This is based on research showing that the system was safe and effective in treating sleep apnea6.  Results showed that the median AHI score decreased 68%, from 29.3 to 9.0. This therapy uses an implant system that senses breathing patterns and delivers mild stimulation to airway muscles, which keeps the airway open during sleep.  The system consists of three fully implanted components: a small generator (similar in size to a pacemaker), a breathing sensor, and a stimulation lead.  Using a small handheld remote, a patient turns the therapy on before bed and off upon awakening.    Candidates for this device must be greater than 22 years of age, have moderate to severe LUCIO (AHI between 20-65), BMI less than 32, have tried CPAP/oral appliance without success, and have appropriate upper airway anatomy (determined by a sleep endoscopy performed by Dr. Stewart).    Hypoglossal Nerve Stimulation Pathway:    The sleep surgeon s office will work with the patient through the insurance prior-authorization process (including communications and appeals).    Nasal Procedures:  Nasal obstruction can interfere with nasal breathing during the day and night.  Studies have shown that relief of nasal obstruction can improve the ability of some patients to tolerate positive airway pressure therapy for obstructive sleep apnea1.  Treatment options include medications such as nasal saline, topical corticosteroid and antihistamine sprays, and oral medications such as antihistamines or decongestants. Non-surgical treatments can include external nasal dilators for selected patients. If these are not successful by themselves, surgery can improve the nasal airway either alone or in combination with these other options.      Combination Procedures:  Combination of surgical procedures and other treatments may be recommended,  particularly if patients have more than one area of narrowing or persistent positional disease.  The success rate of combination surgery ranges from 66-80%2,3.      1. Krista PORRAS. The Role of the Nose in Snoring and Obstructive Sleep Apnoea: An Update.  Eur Arch Otorhinolaryngol. 2011; 268: 1365-73.  2.  David SM; Ray JA; Pearl JR; Pallanch JF; Joshua MB; Anabel SG; Kath MIXON. Surgical modifications of the upper airway for obstructive sleep apnea in adults: a systematic review and meta-analysis. SLEEP 2010;33(10):9426-7134. Tonny AARON. Hypopharyngeal surgery in obstructive sleep apnea: an evidence-based medicine review.  Arch Otolaryngol Head Neck Surg. 2006 Feb;132(2):206-13.  3. Bob YH1, Sudha Y, Carter FAHAD. The efficacy of anatomically based multilevel surgery for obstructive sleep apnea. Otolaryngol Head Neck Surg. 2003 Oct;129(4):327-35.  4. Tonny AARON, Goldberg A. Hypopharyngeal Surgery in Obstructive Sleep Apnea: An Evidence-Based Medicine Review. Arch Otolaryngol Head Neck Surg. 2006 Feb;132(2):206-13.  5. John WAN et al. Upper-Airway Stimulation for Obstructive Sleep Apnea.  N Engl J Med. 2014 Jan 9;370(2):139-49.  6. Maribel Y et al. Increased Incidence of Cardiovascular Disease in Middle-aged Men with Obstructive Sleep Apnea. Am J Respir Crit Care Med; 2002 166: 159-165  7. Goldstein EM et al. Studying Life Effects and Effectiveness of Palatopharyngoplasty (SLEEP) study: Subjective Outcomes of Isolated Uvulopalatopharyngoplasty. Otolaryngol Head Neck Surg. 2011; 144: 623-631.  8.   Your blood pressure was checked while you were in clinic today.  Please read the guidelines below about what these numbers mean and what you should do about them.  Your systolic blood pressure is the top number.  This is the pressure when the heart is pumping.  Your diastolic blood pressure is the bottom number.  This is the pressure in between beats.  If your systolic blood pressure is less than 120 and your diastolic blood  pressure is less than 80, then your blood pressure is normal. There is nothing more that you need to do about it  If your systolic blood pressure is 120-139 or your diastolic blood pressure is 80-89, your blood pressure may be higher than it should be.  You should have your blood pressure re-checked within a year by a primary care provider.  If your systolic blood pressure is 140 or greater or your diastolic blood pressure is 90 or greater, you may have high blood pressure.  High blood pressure is treatable, but if left untreated over time it can put you at risk for heart attack, stroke, or kidney failure.  You should have your blood pressure re-checked by a primary care provider within the next four weeks.                  Follow-ups after your visit        Your next 10 appointments already scheduled     Mar 06, 2018  8:30 AM CST   New Visit with  DIABETIC ED RESOURCE   [facility] [place] ([facility])    [address]  [address]  [place] [address]   [phone]            Mar 06, 2018 10:30 AM CST   New Visit with EDMOND eV[facility] ([facility])    [address]  [facility] [id number] [phone]              Future tests that were ordered for you today     Open Future Orders        Priority Expected Expires Ordered    HST-Home Sleep Apnea Test Routine  8/29/2018 2/27/2018            Who to contact     If you have questions or need follow up information about today's clinic visit or your schedule please contact [facility] directly at [phone].  Normal or non-critical lab and imaging results will be communicated to you by MyChart, letter or phone within 4 business days after the clinic has received the results. If you do not hear from us within 7 days, please contact the clinic through MyChart or phone. If you have a critical or abnormal lab result, we will notify  "you by phone as soon as possible.  Submit refill requests through [employer] or call your pharmacy and they will forward the refill request to us. Please allow 3 business days for your refill to be completed.          Additional Information About Your Visit        [employer] Information     [employer] gives you secure access to your electronic health record. If you see a primary care provider, you can also send messages to your care team and make appointments. If you have questions, please call your primary care clinic.  If you do not have a primary care provider, please call [phone] and they will assist you.        Care EveryWhere ID     This is your Care EveryWhere ID. This could be used by other organizations to access your [place] medical records  [id number]        Your Vitals Were     Pulse Respirations Height Last Period Pulse Oximetry BMI (Body Mass Index)    93 15 1.651 m (5' 5\") 01/18/2013 95% 36.61 kg/m2       Blood Pressure from Last 3 Encounters:   02/27/18 135/90   02/21/18 128/78   01/24/18 124/76    Weight from Last 3 Encounters:   02/27/18 99.8 kg (220 lb)   02/21/18 100 kg (220 lb 8 oz)   01/24/18 101.4 kg (223 lb 8 oz)                 Today's Medication Changes          These changes are accurate as of 2/27/18 10:56 AM.  If you have any questions, ask your nurse or doctor.               These medicines have changed or have updated prescriptions.        Dose/Directions    BASAGLAR 100 UNIT/ML injection   This may have changed:  See the new instructions.   Used for:  Uncontrolled type 2 diabetes mellitus without complication, with long-term current use of insulin (H)        Inject subcutaneously 60  units daily   Quantity:  60 mL   Refills:  0                Primary Care Provider Office Phone # Fax #    Heidy Jama [phone] [address] [place] [address]        Equal Access to Services     LAUREN MICHELLE: samir Ratliff, " juan fuentes pricillaeulalia stark junin hayaan adeeg kharash la'aan ah. So [facility] [phone].    ATENCIÓN: Si carolyne mead, tiene a stallworth disposición servicios gratuitos de asistencia lingüística. Niru al [phone].    We comply with applicable federal civil rights laws and [place] laws. We do not discriminate on the basis of race, color, national origin, age, disability, sex, sexual orientation, or gender identity.            Thank you!     Thank you for choosing [facility]  for your care. Our goal is always to provide you with excellent care. Hearing back from our patients is one way we can continue to improve our services. Please take a few minutes to complete the written survey that you may receive in the mail after your visit with us. Thank you!             Your Updated Medication List - Protect others around you: Learn how to safely use, store and throw away your medicines at www.disposemymeds.org.          This list is accurate as of 2/27/18 10:56 AM.  Always use your most recent med list.                   Brand Name Dispense Instructions for use Diagnosis    albiglutide 50 MG pen    TANZEUM    12 each    Inject 50 mg Subcutaneous once a week    Uncontrolled type 2 diabetes mellitus without complication, with long-term current use of insulin (H)       albuterol 108 (90 BASE) MCG/ACT Inhaler    VENTOLIN HFA    1 Inhaler    Inhale 1-2 puffs into the lungs every 4 hours as needed    Routine general medical examination at a health care facility       amoxicillin-clavulanate 875-125 MG per tablet    AUGMENTIN    20 tablet    Take 1 tablet by mouth 2 times daily    Cellulitis and abscess of trunk       atorvastatin 10 MG tablet    LIPITOR    30 tablet    Take 1 tablet (10 mg) by mouth daily    Uncontrolled type 2 diabetes mellitus without complication, with long-term current use of insulin (H), Hyperlipidemia LDL goal <100       BASAGLAR 100 UNIT/ML injection     60 mL    Inject  subcutaneously 60  units daily    Uncontrolled type 2 diabetes mellitus without complication, with long-term current use of insulin (H)       beclomethasone 40 MCG/ACT Inhaler    QVAR    1 Inhaler    Inhale 2 puffs into the lungs 2 times daily    Mild persistent asthma without complication       * blood glucose monitoring meter device kit    no brand specified    1 kit    Use to test blood sugar 2 times daily or as directed. One touch or as covered by insurance.    Type 2 diabetes mellitus with stage 1 chronic kidney disease, without long-term current use of insulin (H)       * blood glucose monitoring meter device kit     1 kit    Check blood sugar two times daily or as directed    Uncontrolled type 2 diabetes mellitus without complication, with long-term current use of insulin (H)       blood glucose monitoring test strip    no brand specified    200 strip    Use to test blood sugars 2 times daily or as directed. One touch or as covered by insurance    Type 2 diabetes mellitus with stage 1 chronic kidney disease, without long-term current use of insulin (H)       hydrochlorothiazide 25 MG tablet    HYDRODIURIL    30 tablet    Take 1 tablet (25 mg) by mouth every morning    Essential hypertension, benign, Uncontrolled type 2 diabetes mellitus without complication, with long-term current use of insulin (H)       losartan 100 MG tablet    COZAAR    30 tablet    Take 1 tablet (100 mg) by mouth daily    Uncontrolled type 2 diabetes mellitus without complication, with long-term current use of insulin (H), Essential hypertension, benign       metFORMIN 500 MG 24 hr tablet    GLUCOPHAGE-XR    120 tablet    Take 4 tablets (2,000 mg) by mouth daily (with dinner)    Uncontrolled type 2 diabetes mellitus without complication, with long-term current use of insulin (H)       sertraline 50 MG tablet    ZOLOFT    30 tablet    Take 1 tablet (50 mg) by mouth daily    Adjustment disorder with mixed anxiety and depressed mood        ULTICARE MINI 31G X 6 MM   Generic drug:  insulin pen needle     600 each    Use 10 pen needles daily or as directed.    Type 2 diabetes mellitus, uncontrolled (H)       * Notice:  This list has 2 medication(s) that are the same as other medications prescribed for you. Read the directions carefully, and ask your doctor or other care provider to review them with you.

## 2018-02-27 NOTE — PATIENT INSTRUCTIONS
"MY TREATMENT INFORMATION FOR SLEEP DISTURBANCE-  Lala George    DOCTOR : [facility]  SLEEP CENTER :  [place]  MY CONTACT NUMBER:[phone]        If I haven't had a sleep study yet, what can I expect?  A personal story from Mehdi  https://www.Windowfarms.com/watch?v=AxPLmlRpnCs    Am I having a home sleep study?  Here is a video in case you get home and want to make sure you have done it correctly  https://www.ClearCareube.com/watch?v=EVC4X8nIph1&feature=youtu.be    Suspected sleep apnea: Sleep study ordered.    Follow up in sleep clinic 1-2 weeks after sleep study to discuss results of sleep study and treatment options.    Patient was advised not to drive if drowsy or sleepy.    Frequently asked questions:  1. What is Obstructive Sleep Apnea (LUCIO)? LUCIO is the most common type of sleep apnea. Apnea literally means, \"without breath.\" It is characterized by repetitive pauses in breathing, despite continued effort to breathe, and is usually associated with a reduction in blood oxygen saturation. Apneas can last 10 to over 60 seconds. It is caused by narrowing or collapse of the upper airway as muscles relax during sleep. Severity of sleep apnea is determined by frequency of breathing events and their effect on your sleep and oxygen levels determined during sleep testing.   2. What are the consequences of LUCIO? Symptoms include: daytime sleepiness- possibly increasing the risk of falling asleep while driving, unrefreshing/restless sleep, snoring, insomnia, waking frequently to urinate, waking with heartburn or reflux, reduced concentration and memory, and morning headaches. Other health consequences may include development of high blood pressure and other cardiovascular disease in persons who are susceptible. Untreated LUCIO  can contribute to heart disease, stroke and diabetes.   3. What are the treatment options? In most situations, sleep apnea is a lifelong disease that must be managed with daily therapy. " Medications are not effective for sleep apnea and surgery is generally not performed until other therapies have been tried. Therapy is usually tailored to the individual patient based on many factors including your wishes as well as severity of sleep apnea and severity of obesity. Continuous Positive Airway (CPAP) is the most reliable treatment. An oral device to hold your jaw forward is usually the next most reliable option. Other options include postioning devices (to keep you off your back), weight loss, and surgery including a tongue pacing device. There is more detail about some of these options below.    Central sleep apnea is a disorder in which your breathing repeatedly stops and starts during sleep.  Central sleep apnea occurs because your brain doesn't send proper signals to the muscles that control your breathing. This condition is different from obstructive sleep apnea, in which you can't breathe normally because of upper airway obstruction. Central sleep apnea is less common than obstructive sleep apnea.  Central sleep apnea may occur as a result of other conditions, such as heart failure and stroke. Sleeping at a high altitude and pain medications also may cause central sleep apnea.        1. CPAP-  WHAT DOES IT DO AND HOW CAN I LEARN TO WEAR IT?                               BEFORE I START, CAN I WATCH A MOVIE TO GET A PLAN ON HOW TO USE CPAP?  https://www.CubeSensors.com/watch?b=y6D21ys285W      Continuous positive airway pressure, or CPAP, is the most effective treatment for obstructive sleep apnea. It works by blowing room air, through a mask, to hold your throat open. A decision to use CPAP is a major step forward in the pursuit of a healthier life. The successful use of CPAP will help you breathe easier, sleep better and live healthier. You can choose CPAP equipment from any durable medical equipment provider that meets your needs.  Using CPAP can be a positive experience if you keep these key points  "in mind:  1. Commitment  CPAP is not a quick fix for your problem. It involves a long-term commitment to improve your sleep and your health.    2. Communication  Stay in close communication with both your sleep doctor and your CPAP supplier. Ask lots of questions and seek help when you need it.    3. Consistency  Use CPAP all night, every night and for every nap. You will receive the maximum health benefits from CPAP when you use it every time that you sleep. This will also make it easier for your body to adjust to the treatment.    4. Correction  The first machine and mask that you try may not be the best ones for you. Work with your sleep doctor and your CPAP supplier to make corrections to your equipment selection. Ask about trying a different type of machine or mask if you have ongoing problems. Make sure that your mask is a good fit and learn to use your equipment properly.    5. Challenge  Tell a family member or close friend to ask you each morning if you used your CPAP the previous night. Have someone to challenge you to give it your best effort.    6. Connection   Your adjustment to CPAP will be easier if you are able to connect with others who use the same treatment. Ask your sleep doctor if there is a support group in your area for people who have sleep apnea, or look for one on the Internet.  7. Comfort   Increase your level of comfort by using a saline spray, decongestant or heated humidifier if CPAP irritates your nose, mouth or throat. Use your unit's \"ramp\" setting to slowly get used to the air pressure level. There may be soft pads you can buy that will fit over your mask straps. Look on www.CPAP.com for accessories that can help make CPAP use more comfortable.  8. Cleaning   Clean your mask, tubing and headgear on a regular basis. Put this time in your schedule so that you don't forget to do it. Check and replace the filters for your CPAP unit and humidifier.    9. Completion   Although you are " never finished with CPAP therapy, you should reward yourself by celebrating the completion of your first month of treatment. Expect this first month to be your hardest period of adjustment. It will involve some trial and error as you find the machine, mask and pressure settings that are right for you.    10. Continuation  After your first month of treatment, continue to make a daily commitment to use your CPAP all night, every night and for every nap.    CPAP-Tips to starting with success:  Begin using your CPAP for short periods of time during the day while you watch TV or read.    Use CPAP every night and for every nap. Using it less often reduces the health benefits and makes it harder for your body to get used to it.    Make small adjustments to your mask, tubing, straps and headgear until you get the right fit. Tightening the mask may actually worsen the leak.  If it leaves significant marks on your face or irritates the bridge of your nose, it may not be the best mask for you.  Speak with the person who supplied the mask and consider trying other masks. Insurances will allow you to try different masks during the first month of starting CPAP.  Insurance also covers a new mask, hose and filter about every 6 months.    Use a saline nasal spray to ease mild nasal congestion. Neti-Pot or saline nasal rinses may also help. Nasal gel sprays can help reduce nasal dryness.  Biotene mouthwash can be helpful to protect your teeth if you experience frequent dry mouth.  Dry mouth may be a sign of air escaping out of your mouth or out of the mask in the case of a full face mask.  Speak with your provider if you expect that is the case.     Take a nasal decongestant to relieve more severe nasal or sinus congestion.  Do not use Afrin (oxymetazoline) nasal spray more than 3 days in a row.  Speak with your sleep doctor if your nasal congestion is chronic.    Use a heated humidifier that fits your CPAP model to enhance your  breathing comfort. Adjust the heat setting up if you get a dry nose or throat, down if you get condensation in the hose or mask.  Position the CPAP lower than you so that any condensation in the hose drains back into the machine rather than towards the mask.    Try a system that uses nasal pillows if traditional masks give you problems.    Clean your mask, tubing and headgear once a week. Make sure the equipment dries fully.    Regularly check and replace the filters for your CPAP unit and humidifier.    Work closely with your sleep provider and your CPAP supplier to make sure that you have the machine, mask and air pressure setting that works best for you. It is better to stop using it and call your provider to solve problems than to lay awake all night frustrated with the device.    BESIDES CPAP, WHAT OTHER THERAPIES ARE THERE?      Positioning Device  Positioning devices are generally used when sleep apnea is mild and only occurs on your back.This example shows a pillow that straps around the waist. It may be appropriate for those whose sleep study shows milder sleep apnea that occurs primarily when lying flat on one's back. Preliminary studies have shown benefit but effectiveness at home may need to be verified by a home sleep test. These devices are generally not covered by medical insurance.                      Oral Appliance  What is oral appliance therapy?  An oral appliance is a small acrylic device that fits over the upper and lower teeth or tongue (similar to an orthodontic retainer or a mouth guard). This device slightly advances the lower jaw or tongue, which moves the base of the tongue forward, opens the airway, improves breathing and can effectively treat snoring and obstructive sleep apnea sleep apnea. The appliance is fabricated and customized by a qualified dentist with experience in treating snoring and sleep apnea. Oral appliances are usually well tolerated and have relatively high compliance  by patients1, 2, 3.  When is an oral appliance indicated?  Oral appliance therapy is recommended as a first-line treatment for patients with primary snoring, mild sleep apnea, and for patients with moderate sleep apnea who prefer appliance therapy to use of CPAP4, 5. Severity of sleep apnea is determined by sleep testing and is based on the number of respiratory events per hour of sleep.   How successful is oral appliance therapy?  The success rate of oral appliance therapy in patients with mild sleep apnea is 75-80% while in patients with moderate sleep apnea it is 50-70%. The chance of success in patients with severe sleep apnea is 40-50%. The research also shows that oral appliances have a beneficial effect on the cardiovascular health of LUCIO patients at the same magnitude as CPAP therapy7.  Oral appliances should be a second-line treatment in cases of severe sleep apnea, but if not completely successful then a combination therapy utilizing CPAP plus oral appliance therapy may be effective. Oral appliances tend to be effective in a broad range of patients although studies show that the patients who have the highest success are females, younger patients, those with milder disease, and less severe obesity. 3, 6.   The chances of success are lower in patients who have more severe LUCIO, are older, and those who are morbidly obese.     Example of an oral appliance   Finding a dentist that practices dental sleep medicine  Specific training is available through the American Academy of Dental Sleep Medicine for dentists interested in working in the field of sleep. To find a dentist who is educated in the field of sleep and the use of oral appliances, near you, visit the Web site of the American Academy of Dental Sleep Medicine; also see   http://www.accpstorage.org/newOrganization/patients/oralAppliances.pdf  To search for a dentist certified in these practices:  Http://aadsm.org/FindADentist.aspx?1  1. abdiel Stacy al.  Objectively measured vs self-reported compliance during oral appliance therapy for sleep-disordered breathing. Chest 2013; 144(5): 3112-9610.  2. Hui, et al. Objective measurement of compliance during oral appliance therapy for sleep-disordered breathing. Thorax 2013; 68(1): 91-96.  3. Elkin et al. Mandibular advancement devices in 620 men and women with LUCIO and snoring: tolerability and predictors of treatment success. Chest 2004; 125: 2762-0297.  4. Chary et al. Oral appliances for snoring and LUCIO: a review. Sleep 2006; 29: 244-262.  5. Precious et al. Oral appliance treatment for LUCIO: an update. J Clin Sleep Med 2014; 10(2): 215-227.  6. Monroe et al. Predictors of OSAH treatment outcome. J Dent Res 2007; 86: 9375-0583.    Nasal Valves                 Nasal valves may not be effective if you have frequent nasal congestion or have difficulty breathing through your nose. They may be an option for mild apnea if other options are not well tolerated. The efficacy of these devices is generally less than CPAP or oral appliances.      Weight Loss:    Weight management is a personal decision.  If you are interested in exploring weight loss strategies, the following discussion covers the impact on weight loss on sleep apnea and the approaches that may be successful.    Weight loss decreases severity of sleep apnea in most people with obesity. For those with mild obesity who have developed snoring with weight gain, even 15-30 pound weight loss can improve and occasionally eliminate sleep apnea.  Structured and life-long dietary and health habits are necessary to lose weight and keep healthier weight levels.     Though there may be significant health benefits from weight loss, long-term weight loss is very difficult to achieve- studies show success with dietary management in less than 10% of people. In addition, substantial weight loss may require years of dietary control and may be difficult if patients  have severe obesity. In these cases, surgical management may be considered.  Finally, older individuals who have tolerated obesity without health complications may be less likely to benefit from weight loss strategies.    Your BMI is Body mass index is 36.61 kg/(m^2).  Body mass index (BMI) is one way to tell whether you are at a healthy weight, overweight, or obese. It measures your weight in relation to your height.  A BMI of 18.5 to 24.9 is in the healthy range. A person with a BMI of 25 to 29.9 is considered overweight, and someone with a BMI of 30 or greater is considered obese. More than two-thirds of American adults are considered overweight or obese.  Being overweight or obese increases the risk for further weight gain. Excess weight may lead to heart disease and diabetes.  Creating and following plans for healthy eating and physical activity may help you improve your health.  Weight control is part of healthy lifestyle and includes exercise, emotional health, and healthy eating habits. Careful eating habits lifelong are the mainstay of weight control. Though there are significant health benefits from weight loss, long-term weight loss with diet alone may be very difficult to achieve- studies show long-term success with dietary management in less than 10% of people. Attaining a healthy weight may be especially difficult to achieve in those with severe obesity. In some cases, medications, devices and surgical management might be considered.  What can you do?  If you are overweight or obese and are interested in methods for weight loss, you should discuss this with your provider.     Consider reducing daily calorie intake by 500 calories.     Keep a food journal.     Avoiding skipping meals, consider cutting portions instead.    Diet combined with exercise helps maintain muscle while optimizing fat loss. Strength training is particularly important for building and maintaining muscle mass. Exercise helps reduce  stress, increase energy, and improves fitness. Increasing exercise without diet control, however, may not burn enough calories to loose weight.       Start walking three days a week 10-20 minutes at a time    Work towards walking thirty minutes five days a week     Eventually, increase the speed of your walking for 1-2 minutes at time    In addition, we recommend that you review healthy lifestyles and methods for weight loss available through the National Institutes of Health patient information sites:  http://win.niddk.nih.gov/publications/index.htm    And look into health and wellness programs that may be available through your health insurance provider, employer, local community center, or winter club.    Weight management plan: Patient was referred to their PCP to discuss a diet and exercise plan.    Surgery:    Upper Airway Surgery for LUCIO  Surgery for LUCIO is a second-line treatment option in the management of sleep apnea.  Surgery should be considered for patients who are having a difficult time tolerating CPAP.    Surgery for LUCIO is directed at areas that are responsible for narrowing or complete obstruction of the airway during sleep.  There are a wide range of procedures available to enlarge and/or stabilize the airway to prevent blockage of breathing in the three major areas where it can occur: the palate, tongue, and nasal regions.  Successful surgical treatment depends on the accurate identification of the factors responsible for obstructive sleep apnea in each person.  A personalized approach is required because there is no single treatment that works well for everyone.  Because of anatomic variation, consultation with an examination by a sleep surgeon is a critical first step in determining what surgical options are best for each patient.  In some cases, examination during sedation may be recommended in order to guide the selection of procedures.  Patients will be counseled about risks and benefits as  well as the typical recovery course after surgery. Surgery is typically not a cure for a person s LUCIO.  However, surgery will often significantly improve one s LUCIO severity (termed  success rate ).  Even in the absence of a cure, surgery will decrease the cardiovascular risk associated with OSA7; improve overall quality of life8 (sleepiness, functionality, sleep quality, etc).          Palate Procedures:  Patients with LUCIO often have narrowing of their airway in the region of their tonsils and uvula.  The goals of palate procedures are to widen the airway in this region as well as to help the tissues resist collapse.  Modern palate procedure techniques focus on tissue conservation and soft tissue rearrangement, rather than tissue removal.  Often the uvula is preserved in this procedure. Residual sleep apnea is common in patient after pharyngoplasty with an average reduction in sleep apnea events of 33%2.      Tongue Procedures:  While patients are awake, the muscles that surround the throat are active and keep this region open for breathing. These muscles relax during sleep, allowing the tongue and other structures to collapse and block breathing.  There are several different tongue procedures available.  Selection of a tongue base procedure depends on characteristics seen on physical exam.  Generally, procedures are aimed at removing bulky tissues in this area or preventing the back of the tongue from falling back during sleep.  Success rates for tongue surgery range from 50-62%3.    Hypoglossal Nerve Stimulation:  Hypoglossal nerve stimulation has recently received approval from the [place] Food and Drug Administration for the treatment of obstructive sleep apnea.  This is based on research showing that the system was safe and effective in treating sleep apnea6.  Results showed that the median AHI score decreased 68%, from 29.3 to 9.0. This therapy uses an implant system that senses breathing patterns and  delivers mild stimulation to airway muscles, which keeps the airway open during sleep.  The system consists of three fully implanted components: a small generator (similar in size to a pacemaker), a breathing sensor, and a stimulation lead.  Using a small handheld remote, a patient turns the therapy on before bed and off upon awakening.    Candidates for this device must be greater than 22 years of age, have moderate to severe LUCIO (AHI between 20-65), BMI less than 32, have tried CPAP/oral appliance without success, and have appropriate upper airway anatomy (determined by a sleep endoscopy performed by Dr. Stewart).    Hypoglossal Nerve Stimulation Pathway:    The sleep surgeon s office will work with the patient through the insurance prior-authorization process (including communications and appeals).    Nasal Procedures:  Nasal obstruction can interfere with nasal breathing during the day and night.  Studies have shown that relief of nasal obstruction can improve the ability of some patients to tolerate positive airway pressure therapy for obstructive sleep apnea1.  Treatment options include medications such as nasal saline, topical corticosteroid and antihistamine sprays, and oral medications such as antihistamines or decongestants. Non-surgical treatments can include external nasal dilators for selected patients. If these are not successful by themselves, surgery can improve the nasal airway either alone or in combination with these other options.      Combination Procedures:  Combination of surgical procedures and other treatments may be recommended, particularly if patients have more than one area of narrowing or persistent positional disease.  The success rate of combination surgery ranges from 66-80%2,3.      1. Krista PORRAS. The Role of the Nose in Snoring and Obstructive Sleep Apnoea: An Update.  Eur Arch Otorhinolaryngol. 2011; 268: 1365-73.  2.  David SM; Ray WEINSTEIN; Pearl CARVALHO; Pallanch JF; Joshua HILARIO; Anabel  SG; Kath MIXON. Surgical modifications of the upper airway for obstructive sleep apnea in adults: a systematic review and meta-analysis. SLEEP 2010;33(10):9622-2190. Tonny AARON. Hypopharyngeal surgery in obstructive sleep apnea: an evidence-based medicine review.  Arch Otolaryngol Head Neck Surg. 2006 Feb;132(2):206-13.  3. Bob YH1, Sudha Y, Carter FAHAD. The efficacy of anatomically based multilevel surgery for obstructive sleep apnea. Otolaryngol Head Neck Surg. 2003 Oct;129(4):327-35.  4. Tonny AARON, Goldberg A. Hypopharyngeal Surgery in Obstructive Sleep Apnea: An Evidence-Based Medicine Review. Arch Otolaryngol Head Neck Surg. 2006 Feb;132(2):206-13.  5. John WAN et al. Upper-Airway Stimulation for Obstructive Sleep Apnea.  N Engl J Med. 2014 Jan 9;370(2):139-49.  6. Maribel Y et al. Increased Incidence of Cardiovascular Disease in Middle-aged Men with Obstructive Sleep Apnea. Am J Respir Crit Care Med; 2002 166: 159-165  7. Goldstein EM et al. Studying Life Effects and Effectiveness of Palatopharyngoplasty (SLEEP) study: Subjective Outcomes of Isolated Uvulopalatopharyngoplasty. Otolaryngol Head Neck Surg. 2011; 144: 623-631.  8.   Your blood pressure was checked while you were in clinic today.  Please read the guidelines below about what these numbers mean and what you should do about them.  Your systolic blood pressure is the top number.  This is the pressure when the heart is pumping.  Your diastolic blood pressure is the bottom number.  This is the pressure in between beats.  If your systolic blood pressure is less than 120 and your diastolic blood pressure is less than 80, then your blood pressure is normal. There is nothing more that you need to do about it  If your systolic blood pressure is 120-139 or your diastolic blood pressure is 80-89, your blood pressure may be higher than it should be.  You should have your blood pressure re-checked within a year by a primary care provider.  If your systolic blood  pressure is 140 or greater or your diastolic blood pressure is 90 or greater, you may have high blood pressure.  High blood pressure is treatable, but if left untreated over time it can put you at risk for heart attack, stroke, or kidney failure.  You should have your blood pressure re-checked by a primary care provider within the next four weeks.

## 2018-02-27 NOTE — NURSING NOTE
"Chief Complaint   Patient presents with     Consult     SS done [place] yrs ago in [place], No LUCIO,  Snoring per family, Can get to sleep but not stay asleep. wakes up several times.         Initial /90  Pulse 93  Resp 15  Ht 1.651 m (5' 5\")  Wt 99.8 kg (220 lb)  LMP 01/18/2013  SpO2 95%  BMI 36.61 kg/m2 Estimated body mass index is 36.61 kg/(m^2) as calculated from the following:    Height as of this encounter: 1.651 m (5' 5\").    Weight as of this encounter: 99.8 kg (220 lb).  Medication Reconciliation: complete       Neck 43cm  17in  Ess 13      Niru Murillo LPN/IDANE  "

## 2018-02-27 NOTE — PROGRESS NOTES
[facility]  Outpatient Sleep Medicine Consultation  February 27, 2018      Name: Lala George MRN# [id number]   Age: 47 year old YOB: 1970     Date of Consultation: February 27, 2018  Consultation is requested by: Heidy Jama MD  [address] [place] [address]  Primary care provider: Heidy Jama  Home clinic: [facility]        Reason for Sleep Consult:     Lala George is a 47 year old female nightly snoring, gasping, poor quality of sleep and excessive daytime sleepiness and tiredness.         Assessment and Plan:     Summary Sleep Diagnoses/Recommendations:    1. Sleep Disturbance/hypersomnolence:  High suspicion of sleep disordered breathing based on patient's symptoms (snoring, waking up gasping air, excessive daytime sleepiness and tiredness), high BMI, neck circumference and oropharyngeal examination. Will schedule Home sleep study. We also discussed the pathophysiology of sleep disordered breathing and the importance of treating it if S/he should have it. Patient is advised not to drive if he/she feels drowsy or sleepy.  Follow up after sleep study to discuss the result of sleep study and treatment options.    2. Obesity:  Counseled regarding weight loss through diet modification and increased physical activity. Patient was given instuctions of weight loss and advised to follow up her PCP for further weight loss interventions.        Orders Placed This Encounter   Procedures     HST-Home Sleep Apnea Test       Summary Counseling:  See instructions    Counseling included a comprehensive review of diagnostic and therapeutic strategies as well as risks of inadequate therapy.  Educational materials provided in instructions.    All questions were answered.  The patient indicates understanding of the above issues and agrees with the plan set forth.           History of Present Illness:     Lala George is a 47 year old female with history of  hypertension, DM type II,  hyperlipidemia, anxiety and asthma who presents to the [facility] in [place] with complains of sleep disturbance. I was asked to see Ms. George regarding snoring and sleep disturbance by Dr. Jama.   Patient had sleep study at [place] 20 years ago and she reports that the study was negative for sleep apnea.  Patient complains loud snoring, waking up gasping air, wake up coughing but not choking, not refreshing/resting sleep, difficulty staying asleep, excessive daytime sleepiness affecting her tasks at work and tiredness for several years. She has frequent awakening at night which cause difficulty staying asleep at night. She has morning headache and dry mouth and throat in the morning. She denies witnessed apnea. She has occasional restless leg, stretches her legs couple of times a week.    Please see below for sleep ROS details.    PREVIOUS IN- LAB or HOME SLEEP STUDIES:  Yes, 20 years ago at [place], no report available    SLEEP-WAKE SCHEDULE:     Lala George     -Describes themself as a morning person;      -ON WEEKDAYS and ON WEEKENDS, goes to sleep at 7:30 PM during the week; awakens  3:00 AM with an alarm; falls asleep in 15-30 minutes; denies difficulty falling asleep.     -Awakens 4-5 times a night for 3-5 minutes before falling back to sleep; awakens to go to the bathroom and uncertain reasons.      -Total sleep time: 6-8 hours per night.    -Naps 2-3 times/days per week for  minutes, feels refreshed after naps; takes no inadvertant naps.       BEDTIME ACTIVITIES AND SHIFT WORK:    Lala George    -does use electronics in bed, watch TV in bed and read in bed and does not eat and work in bed.     -does not do shift work.  He/she works day shifts.      Occupation: office work     SCALES       SLEEP APNEA: Stopbang score: 5       INSOMNIA:  Insomnia severity score: N/A       SLEEPINESS: Normangee sleepiness scale (ESS): 13   Drowsy driving yes but no near  accidents          PHQ9: N/A    SLEEP COMPLAINTS:   Snoring- 7 days/week  Witness apnea: No  Gasping/Choking: Yes  Excessive daytime sleep: Yes  Toss/turn: Yes  Excessive tiredness/fatigue:  Yes  Morning headaches: Yes  Dry mouth/throat: Yes  Dyspnea: Yes  Coexisting Lung disease: asthma    Coexisting Heart disease: No    Does patient have a bed partner: No  Has bed partner been sleeping separately because of snoring:  N/A            RLS Screen: When you try to relax in the evening or sleep at  night, do you ever have unpleasant, restless feelings in your  legs that can be relieved by walking or movement? Yes, occasional    Periodic limb movement: No    Narcolepsy:       denies sudden urges of sleep attacks     denies cataplexy     denies sleep paralysis      denies hallucinations     Sleep Behaviors:     denies leg symptoms/movements     denies motor restlessness     denies night terrors     Yes bruxism, clinches her teeth     denies automatic behaviors    Other subjective complaints:     denies anxiety or rumination      denies pain and discomfort at  night     denies waking up with heart pounding or racing     Yes GERD/heartburn         Parasomnia:   NREM - denies recurrent persistent confusional arousal, night eating, sleep walking or sleep terrors   REM  - denies dream enactment; no injuries.     Safety: None             Medications:     Current Outpatient Prescriptions   Medication Sig     amoxicillin-clavulanate (AUGMENTIN) 875-125 MG per tablet Take 1 tablet by mouth 2 times daily     sertraline (ZOLOFT) 50 MG tablet Take 1 tablet (50 mg) by mouth daily     metFORMIN (GLUCOPHAGE-XR) 500 MG 24 hr tablet Take 4 tablets (2,000 mg) by mouth daily (with dinner)     atorvastatin (LIPITOR) 10 MG tablet Take 1 tablet (10 mg) by mouth daily     losartan (COZAAR) 100 MG tablet Take 1 tablet (100 mg) by mouth daily     hydrochlorothiazide (HYDRODIURIL) 25 MG tablet Take 1 tablet (25 mg) by mouth every morning      albiglutide (TANZEUM) 50 MG pen Inject 50 mg Subcutaneous once a week     insulin glargine (BASAGLAR KWIKPEN) 100 UNIT/ML injection Inject subcutaneously 60  units daily (Patient taking differently: Inject subcutaneously 68 units daily)     blood glucose monitoring (ONE TOUCH ULTRA 2) meter device kit Check blood sugar two times daily or as directed     ULTICARE MINI 31G X 6 MM insulin pen needle Use 10 pen needles daily or as directed.     blood glucose monitoring (NO BRAND SPECIFIED) test strip Use to test blood sugars 2 times daily or as directed. One touch or as covered by insurance     blood glucose monitoring (NO BRAND SPECIFIED) meter device kit Use to test blood sugar 2 times daily or as directed. One touch or as covered by insurance.     beclomethasone (QVAR) 40 MCG/ACT Inhaler Inhale 2 puffs into the lungs 2 times daily     albuterol (VENTOLIN HFA) 108 (90 BASE) MCG/ACT inhaler Inhale 1-2 puffs into the lungs every 4 hours as needed     No current facility-administered medications for this visit.         Medication that can affect sleep: Zoloft    Allergies   Allergen Reactions     Bacitracin Rash            Past Medical History:     Does not need 02 supplement at night     Past Medical History:   Diagnosis Date     BENIGN HYPERTENSION 2/15/2006     Mild persistent asthma                Past Surgical History:    No previous upper airway surgery     Past Surgical History:   Procedure Laterality Date     NO HISTORY OF SURGERY              Social History:     Social History   Substance Use Topics     Smoking status: Never Smoker     Smokeless tobacco: Never Used     Alcohol use Yes      Comment: rare--one to two per week         Chemical History:     Tobacco: No     Uses no caffeine.     EtOH: Yes, rarely  Recreational Drugs: No    Psych Hx:   Anxiety     Current dangers to self or others: None           Family History:     Family History   Problem Relation Age of Onset     Breast Cancer Mother 30     first  "diagnosed in her early 30's     Hypertension Mother      DIABETES Mother      C.A.ZACH. Mother      CHF 50s ,pacemaker late 50s, CABG 60s     C.AANGEL. Maternal Aunt      60s        Sleep Family Hx:        RLS- Niece  LUCIO - cousins and aunt  Insomnia - No  Parasomnia - No         Review of Systems:     A complete 10 point review of systems was negative other than HPI or as commented below:   Patient denies chest pain,  wheezing, abdominal pain, n&v, fever, chills, dysuria, leg pain or swelling. Patient is also denies ear pain, sore throat.    Patient has vision change, headaches, postnasal drip, running nose, dyspnea with activity, dry and productive cough.    Lala George has gained 0-5 pounds in 10 years.            Physical Examination:   /90  Pulse 93  Resp 15  Ht 1.651 m (5' 5\")  Wt 99.8 kg (220 lb)  LMP 01/18/2013  SpO2 95%  BMI 36.61 kg/m2     Neck Circumference: 43 cm   Constitutional: . Awake, alert, cooperative, in no apparent distress  Mood: euthymic; affect congruent with full range and intensity.  Attention/Concentration:  Normal   Eyes: Pupils round and reactive. No icterus.  ENT: Mallampati Class: IV.   Tonsillar Stage: 1  hidden by pillars  Clear nasal passages. Enlarged inferior turbinates. No deviated septum.  Oropharynx: No high arched palate. No pharyngeal erythema or exudates, elongated uvula. No lateral narrowing  Tongue: No relative macroglossia   Dentition: Good. Mild overjet  Dentures: None  Neck: Supple, no thyroid enlargement.   Cardiovascular: Regular S1 and S2, no gallops or murmurs.   Pulmonary:  Chest symmetric, lungs clear bilaterally and no crackles, wheezes or rales.  Abdomen: Soft, obese, non tender.  Extremities:  No pedal edema.  Muscle/joint: Strength and tone normal   Skin:  No rash or significant lesions.   Neurologic: Alert, oriented x3, no focal neurological deficit.           Data: All pertinent previous laboratory data reviewed     No results found for: PH, " PHARTERIAL, PO2, [id number], SAT, PCO2, HCO3, BASEEXCESS, MOISES, BEB  Lab Results   Component Value Date    TSH 2.40 01/24/2018    TSH 2.88 07/10/2017     Lab Results   Component Value Date     (H) 01/24/2018     (H) 04/10/2017     Lab Results   Component Value Date    HGB 13.4 10/17/2007    HGB 14.4 02/15/2006     Lab Results   Component Value Date    BUN 18 01/24/2018    BUN 13 04/10/2017    CR 0.73 01/24/2018    CR 0.52 04/10/2017     Lab Results   Component Value Date    CO2 29 01/24/2018    CO2 28 04/10/2017     No results found for: RANDAL      Echocardiography: No    Chest x-ray: 12/9/2014 3:31 PM      IMPRESSION: Negative.    PFT: No        Copy to: Heidy Jama  Copy to: Heidy De Jesus MD 2/27/2018   [facility]  [address], [place], [place] [id number] [phone] Clinic    Total time spent with patient: 44 minutes with this patient today in which 25 minutes was spent in counseling/coordination of care and going over planned testing and recommendations.

## 2018-03-06 ENCOUNTER — OFFICE VISIT (OUTPATIENT)
Dept: ENDOCRINOLOGY | Facility: CLINIC | Age: 48
End: 2018-03-06
Payer: COMMERCIAL

## 2018-03-06 ENCOUNTER — OFFICE VISIT (OUTPATIENT)
Dept: EDUCATION SERVICES | Facility: CLINIC | Age: 48
End: 2018-03-06
Payer: COMMERCIAL

## 2018-03-06 ENCOUNTER — TELEPHONE (OUTPATIENT)
Dept: ENDOCRINOLOGY | Facility: CLINIC | Age: 48
End: 2018-03-06

## 2018-03-06 VITALS
SYSTOLIC BLOOD PRESSURE: 138 MMHG | WEIGHT: 225 LBS | DIASTOLIC BLOOD PRESSURE: 90 MMHG | RESPIRATION RATE: 16 BRPM | HEART RATE: 93 BPM | TEMPERATURE: 98.3 F | BODY MASS INDEX: 37.44 KG/M2

## 2018-03-06 LAB — GLUCOSE SERPL-MCNC: 221 MG/DL (ref 70–99)

## 2018-03-06 PROCEDURE — 36415 COLL VENOUS BLD VENIPUNCTURE: CPT | Performed by: CLINICAL NURSE SPECIALIST

## 2018-03-06 PROCEDURE — 82947 ASSAY GLUCOSE BLOOD QUANT: CPT | Performed by: CLINICAL NURSE SPECIALIST

## 2018-03-06 PROCEDURE — 99204 OFFICE O/P NEW MOD 45 MIN: CPT | Performed by: CLINICAL NURSE SPECIALIST

## 2018-03-06 PROCEDURE — G0108 DIAB MANAGE TRN  PER INDIV: HCPCS | Performed by: DIETITIAN, REGISTERED

## 2018-03-06 PROCEDURE — 84681 ASSAY OF C-PEPTIDE: CPT | Performed by: CLINICAL NURSE SPECIALIST

## 2018-03-06 PROCEDURE — 95250 CONT GLUC MNTR PHYS/QHP EQP: CPT | Performed by: DIETITIAN, REGISTERED

## 2018-03-06 NOTE — PROGRESS NOTES
Name: Lala George  Seen at the request of Heidy Jama for Diabetes.  HPI:  Lala George is a 47 year old female who presents for the evaluation/management of:    1. Type 2 DM:  Originally diagnosed: 12/2014.  Glucose slightly elevated 109-113 the year prior to diagnosis.  Glucose at the time of diagnosis was 317.    Current Regimen: Metformin XR 2000 mg/day, Tanzeum 50 mg weekly, Basaglar 72 units qd  Initially treated with Metformin, Lantus insulin was added a short time after diagnosis. Lantus was later changed to Basaglar due to insurance.  Was briefly treated with glipizide in 2015, for about 2-3 months, discontinued due to ineffectiveness  Trulicity was added 4/2017, this was later changed to Tanzeum due to insurance formulary  BS checks: qd, after dinner.    Meter Download: Did not bring meter.  Reports blood sugars are in the 200's, 215-265.    Works night shift 10 pm to 6 am, Tuesday - Sunday.    Complications:   Diabetes Complications  Description / Detail    Diabetic Retinopathy  No retinopathy, last eye exam 2015.   CAD / PAD  No   Neuropathy  No   Nephropathy / Microalbuminuria  Elevated urine microalbumin x 1   Gastroparesis  No   Hypoglycemia Unawareness  No     2. Hypertension: Blood Pressure today:   BP Readings from Last 3 Encounters:   03/20/18 134/86   03/06/18 138/90   02/27/18 135/90   .  Blood pressure medications include losartan 100 mg qd, HCTZ 25 mg qd.  Takes medications everyday without forgetting a dose.  Denies feeling lightheaded or dizzy.    3. Hyperlipidemia: Takes atorvastatin 10 mg qd for lipid control.  Denies muscle aches of pains.       4. Prevention:  Flu Shot- 9/2017  Pneumovax- 3/2/2015  Opthalmology-Yes: overdue, referral provided.  Dental-recommend semiannually  ASA-No  Smoking- No  PMH/PSH:  Past Medical History:   Diagnosis Date     BENIGN HYPERTENSION 2/15/2006     Mild persistent asthma      Past Surgical History:   Procedure Laterality Date     NO HISTORY OF  SURGERY       Family Hx:  Family History   Problem Relation Age of Onset     Breast Cancer Mother 30     first diagnosed in her early 30's     Hypertension Mother      DIABETES Mother      C.A.ZACH. Mother      CHF 50s ,pacemaker late 50s, CABG 60s     CPrettyAANGEL. Maternal Aunt      60s     Glaucoma No family hx of      Macular Degeneration No family hx of      Thyroid disease: No         DM2: Yes: mother and one sibling         Autoimmune: DM1, SLE, RA, Vitiligo No    Social Hx:  Social History     Social History     Marital status: Single     Spouse name: N/A     Number of children: N/A     Years of education: N/A     Occupational History      Other     part time, [employer]     Social History Main Topics     Smoking status: Never Smoker     Smokeless tobacco: Never Used     Alcohol use Yes      Comment: rare--one to two per week     Drug use: No     Sexual activity: No     Other Topics Concern     Not on file     Social History Narrative          MEDICATIONS:  has a current medication list which includes the following prescription(s): sertraline, metformin, atorvastatin, losartan, hydrochlorothiazide, albiglutide, blood glucose monitoring, ulticare mini, blood glucose monitoring, blood glucose monitoring, beclomethasone, albuterol, continuous blood glucose monitoring, freestyle estiven reader, humalog kwikpen, insulin degludec, and basaglar.    ROS     ROS: 10 point ROS neg other than the symptoms noted above in the HPI.    Physical Exam   VS: /90 (BP Location: Left arm, Patient Position: Chair, Cuff Size: Adult Large)  Pulse 93  Temp 98.3  F (36.8  C) (Oral)  Resp 16  Wt 102.1 kg (225 lb)  LMP 01/18/2013  BMI 37.44 kg/m2  GENERAL: AXOX3, NAD, well dressed, answering questions appropriately, appears stated age.  HEENT:  no exophthalmos, no proptosis, EOMI, no lig lag, no retraction  CV: RRR, no rubs, gallops, no murmurs  LUNGS: CTAB, no wheezes, rales, or rhonchi  ABDOMEN: soft, nontender,  nondistended  EXTREMITIES: no edema, +pulses, no rashes, no lesions  NEUROLOGY: CN grossly intact, no tremors  MSK: grossly intact  SKIN: no rashes, no lesions    LABS:  A1c:   Component      Latest Ref Rng & Units 5/18/2016 1/4/2017 4/10/2017 1/24/2018   Hemoglobin A1C      4.3 - 6.0 % 14.6 (H) 13.3 (H) 13.0 (H) 10.1 (H)     Basic Metabolic Panel:  !COMPREHENSIVE Latest Ref Rng & Units 1/24/2018   SODIUM 133 - 144 mmol/L 136   POTASSIUM 3.4 - 5.3 mmol/L 4.1   CHLORIDE 94 - 109 mmol/L 101   BUN 7 - 30 mg/dL 18   Creatinine 0.52 - 1.04 mg/dL 0.73   Glucose 70 - 99 mg/dL 344 (H)   ANION GAP 3 - 14 mmol/L 6   CALCIUM 8.5 - 10.1 mg/dL 9.0   ALBUMIN 3.4 - 5.0 g/dL 3.9   PROTEIN, TOTAL 6.8 - 8.8 g/dL 7.1     LFTS/Cholesterol Panel:  !LIPID/HEPATIC Latest Ref Rng & Units 2/21/2018   CHOLESTEROL <200 mg/dL 174   TRIGLYCERIDES <150 mg/dL 138   HDL CHOLESTEROL >49 mg/dL 62   LDL CHOLESTEROL, CALCULATED <100 mg/dL 84   VLDL-CHOLESTEROL 0 - 30 mg/dL    NON HDL CHOLESTEROL <130 mg/dL 112     !LIPID/HEPATIC Latest Ref Rng & Units 12/31/2014 4/1/2015 5/18/2016   AST 0 - 45 U/L 96 (H) 67 (H) 124 (H)   ALT 0 - 50 U/L 165 (H) 120 (H) 152 (H)     !LIPID/HEPATIC Latest Ref Rng & Units 1/4/2017 4/10/2017 1/24/2018   AST 0 - 45 U/L 68 (H) 35 28   ALT 0 - 50 U/L 104 (H) 70 (H) 37     Thyroid Function:   !THYROID Latest Ref Rng & Units 1/24/2018 7/10/2017 5/18/2016   TSH 0.40 - 4.00 mU/L 2.40 2.88 3.09     Urine MicroAlbumin:  Component    Latest Ref Rng & Units 1/21/2015 5/18/2016 1/24/2018   Creatinine Urine    mg/dL 100 73 41   Albumin Urine mg/L    mg/L 22 35 6   Albumin Urine mg/g Cr    0 - 25 mg/g Cr 22.11 47.94 (H) 14.99     Vitamin D:    All pertinent notes, labs, and images personally reviewed by me.     A/P  Ms.Shari George is a 47 year old here for the evaluation/management of diabetes:    1. DM2 - Uncontrolled.  Check glucose and cpeptide.  Obtain diagnostic CGM.  Referral provided for eye exam  There is some variability among  people, most will usually develop symptoms suggestive of hypoglycemia when blood glucose levels are lowered to the mid 60's. The first set of symptoms are called adrenergic. Patients may experience any of the following nervousness, sweating, intense hunger, trembling, weakness, palpitations, and difficulty speaking.   The acute management of hypoglycemia involves the rapid delivery of a source of easily absorbed sugar. Regular soda, juice, lifesavers, table sugar, are good options. 15 grams of glucose is the dose that is given, followed by an assessment of symptoms and a blood glucose check if possible. If after 10 minutes there is no improvement, another 10-15 grams should be given. This can be repeated up to three times. The equivalency of 10-15 grams of glucose (approximate servings) are: 3-5 hard candies, 3 teaspoons of sugar, or 1/2 cup of regular soda or juice.      2. Hypertension - Marginal.  Needs better control.  Recommend he see his PCP for further treatment options.    3. Hyperlipidemia - On statin therapy.      Labs ordered today:   Orders Placed This Encounter   Procedures     GLUCOSE MONITORING CONTINUOUS, >=72 HR     Glucose     C-peptide     Hemoglobin A1c     JUST IN CASE     DIABETES EDUCATOR REFERRAL     OPHTHALMOLOGY ADULT REFERRAL       Radiology/Consults ordered today: DIABETES EDUCATOR REFERRAL  OPHTHALMOLOGY ADULT REFERRAL    All questions were answered.  The patient indicates understanding of the above issues and agrees with the plan set forth.  Total face to face time greater than or equal to 45 minutes.       Follow-up:  1 month    Idalia Boudreaux NP  Endocrinology  [facility]  CC:

## 2018-03-06 NOTE — MR AVS SNAPSHOT
After Visit Summary   3/6/2018    Lala George    MRN: [id number]           Patient Information     Date Of Birth          1970        Visit Information        Provider Department      3/6/2018 8:30 AM EA DIABETIC ED RESOURCE [facility] [place]        Today's Diagnoses     Uncontrolled type 2 diabetes mellitus without complication, with long-term current use of insulin (H)    -  1       Follow-ups after your visit        Your next 10 appointments already scheduled     Mar 12, 2018 10:30 AM CDT   Diabetic Education with Bessie CANDELARIA Vladislav   [facility] [place] ([facility])    [address]  [facility] [address]   [phone]            Mar 12, 2018  1:00 PM CDT   HST  with BED 7 SH SLEEP   [facility] ([facility])    [address]  [facility] [address]   [phone]            Mar 13, 2018 10:30 AM CDT   HST Drop Off with  SLEEP CENTER DME   [facility] ([facility])    [address]  [facility] [address]   [phone]            Mar 19, 2018  1:30 PM CDT   Diabetic Education with Bessie Loomis   [facility] [place] ([facility])    [address]  [facility] [address]   [phone]            Mar 21, 2018  5:00 PM CDT   Return Sleep Patient with Mir De Jesus MD   [facility] ([facility])    [address]  [facility] [address]   [phone]            Apr 03, 2018 10:30 AM CDT   Return Visit with EDMOND Velez CNP   [facility] ([facility])    [address]  [facility] [address]   [phone]            Apr 03, 2018 10:30 AM CDT   LAB with CR LAB   [facility] ([facility])    [address]  [facility] [address] [phone]           Please do not eat 10-12 hours before your appointment if you are coming in fasting for labs on lipids, cholesterol, or glucose (sugar). This does not apply to pregnant women. Water, hot tea and black coffee (with nothing added) are okay. Do not drink other fluids, diet soda or chew gum.              Future tests that were ordered for you today     Open Future Orders        Priority Expected Expires Ordered    GLUCOSE MONITORING CONTINUOUS, >=72 HR Routine  3/6/2019 3/6/2018            Who to contact     If you have questions or need follow up information about today's clinic visit or your schedule please contact [facility] directly at [phone].  Normal or non-critical lab and imaging results will be communicated to you by WordRake[employer], letter or phone within 4 business days after the clinic has received the results. If you do not hear from us within 7 days, please contact the clinic through [employer] or phone. If you have a critical or abnormal lab result, we will notify you by phone as soon as possible.  Submit refill requests through "Hey, Neighbor!" or call your pharmacy and they will forward the refill request to us. Please allow 3 business days for your refill to be completed.          Additional Information About Your Visit        "Hey, Neighbor!" Information     "Hey[employer] Neighbor!" gives you secure access to your electronic health record. If you see a primary care provider, you can also send messages to your care team and make appointments. If you have questions, please call your primary care clinic.  If you do not have a primary care provider, please call [phone] and they will assist you.        Care EveryWhere ID     This is your Care EveryWhere ID. This could be used by other organizations to access your [facility] medical records  [id number]        Your Vitals Were     Last Period                   01/18/2013            Blood Pressure from Last 3  Encounters:   03/06/18 138/90   02/27/18 135/90   02/21/18 128/78    Weight from Last 3 Encounters:   03/06/18 225 lb (102.1 kg)   02/27/18 220 lb (99.8 kg)   02/21/18 220 lb 8 oz (100 kg)              We Performed the Following     CONTINUOUS GLUCOSE MONITORING                              [30900]     DIABETES EDUCATION - Individual  []          Today's Medication Changes          These changes are accurate as of 3/6/18 12:08 PM.  If you have any questions, ask your nurse or doctor.               Start taking these medicines.        Dose/Directions    insulin degludec 200 UNIT/ML pen   Commonly known as:  TRESIBA   Used for:  Uncontrolled type 2 diabetes mellitus without complication, with long-term current use of insulin (H)   Started by:  Idalia Boudreaux APRN CNP        Dose:  72 Units   Inject 72 Units Subcutaneous daily   Quantity:  18 mL   Refills:  3         These medicines have changed or have updated prescriptions.        Dose/Directions    BASAGLAR 100 UNIT/ML injection   This may have changed:  See the new instructions.   Used for:  Uncontrolled type 2 diabetes mellitus without complication, with long-term current use of insulin (H)        Inject subcutaneously 60  units daily   Quantity:  60 mL   Refills:  0            Where to get your medicines      These medications were sent to [facility] - [address]   [address], [place] [address]     Phone:  [phone]     insulin degludec 200 UNIT/ML pen                Primary Care Provider Office Phone # Fax #    Heidy Jama [phone]       [facility] [address] [address] [place] [address]        Equal Access to Services     [facility]: Hadii holland jewell Somike, waaxda luqadaha, qaybta kaalmada eulalia menard. [facility] [phone].    ATENCIÓN: Si habla español, tiene a stallworth disposición servicios gratuitos de asistencia  lingüísticaPretty Marrufo al [phone].    We comply with applicable federal civil rights laws and [place] laws. We do not discriminate on the basis of race, color, national origin, age, disability, sex, sexual orientation, or gender identity.            Thank you!     Thank you for choosing [facility]  for your care. Our goal is always to provide you with excellent care. Hearing back from our patients is one way we can continue to improve our services. Please take a few minutes to complete the written survey that you may receive in the mail after your visit with us. Thank you!             Your Updated Medication List - Protect others around you: Learn how to safely use, store and throw away your medicines at www.disposemymeds.org.          This list is accurate as of 3/6/18 12:08 PM.  Always use your most recent med list.                   Brand Name Dispense Instructions for use Diagnosis    albiglutide 50 MG pen    TANZEUM    12 each    Inject 50 mg Subcutaneous once a week    Uncontrolled type 2 diabetes mellitus without complication, with long-term current use of insulin (H)       albuterol 108 (90 BASE) MCG/ACT Inhaler    VENTOLIN HFA    1 Inhaler    Inhale 1-2 puffs into the lungs every 4 hours as needed    Routine general medical examination at a health care facility       atorvastatin 10 MG tablet    LIPITOR    30 tablet    Take 1 tablet (10 mg) by mouth daily    Uncontrolled type 2 diabetes mellitus without complication, with long-term current use of insulin (H), Hyperlipidemia LDL goal <100       BASAGLAR 100 UNIT/ML injection     60 mL    Inject subcutaneously 60  units daily    Uncontrolled type 2 diabetes mellitus without complication, with long-term current use of insulin (H)       beclomethasone 40 MCG/ACT Inhaler    QVAR    1 Inhaler    Inhale 2 puffs into the lungs 2 times daily    Mild persistent asthma without complication       * blood glucose monitoring meter device kit     no brand specified    1 kit    Use to test blood sugar 2 times daily or as directed. One touch or as covered by insurance.    Type 2 diabetes mellitus with stage 1 chronic kidney disease, without long-term current use of insulin (H)       * blood glucose monitoring meter device kit     1 kit    Check blood sugar two times daily or as directed    Uncontrolled type 2 diabetes mellitus without complication, with long-term current use of insulin (H)       blood glucose monitoring test strip    no brand specified    200 strip    Use to test blood sugars 2 times daily or as directed. One touch or as covered by insurance    Type 2 diabetes mellitus with stage 1 chronic kidney disease, without long-term current use of insulin (H)       hydrochlorothiazide 25 MG tablet    HYDRODIURIL    30 tablet    Take 1 tablet (25 mg) by mouth every morning    Essential hypertension, benign, Uncontrolled type 2 diabetes mellitus without complication, with long-term current use of insulin (H)       insulin degludec 200 UNIT/ML pen    TRESIBA    18 mL    Inject 72 Units Subcutaneous daily    Uncontrolled type 2 diabetes mellitus without complication, with long-term current use of insulin (H)       losartan 100 MG tablet    COZAAR    30 tablet    Take 1 tablet (100 mg) by mouth daily    Uncontrolled type 2 diabetes mellitus without complication, with long-term current use of insulin (H), Essential hypertension, benign       metFORMIN 500 MG 24 hr tablet    GLUCOPHAGE-XR    120 tablet    Take 4 tablets (2,000 mg) by mouth daily (with dinner)    Uncontrolled type 2 diabetes mellitus without complication, with long-term current use of insulin (H)       sertraline 50 MG tablet    ZOLOFT    30 tablet    Take 1 tablet (50 mg) by mouth daily    Adjustment disorder with mixed anxiety and depressed mood       ULTICARE MINI 31G X 6 MM   Generic drug:  insulin pen needle     600 each    Use 10 pen needles daily or as directed.    Type 2 diabetes  mellitus, uncontrolled (H)       * Notice:  This list has 2 medication(s) that are the same as other medications prescribed for you. Read the directions carefully, and ask your doctor or other care provider to review them with you.

## 2018-03-06 NOTE — MR AVS SNAPSHOT
After Visit Summary   3/6/2018    Lala George    MRN: [id number]           Patient Information     Date Of Birth          1970        Visit Information        Provider Department      3/6/2018 10:30 AM Idalia Boudreaux APRN [facility]        Today's Diagnoses     Uncontrolled type 2 diabetes mellitus without complication, with long-term current use of insulin (H)    -  1      Care Instructions        When you use up your current supply of Basaglar, I want you to start Tresiba 72 units once daily.    Remember you can take a missed dose of Tresiba the next day and still take your usual dose of Tresiba that same day as long as there is at least 8 hours between the two injections.    Activate the Tresiba savings card and present it to the pharmacy when you pick the Tresiba up.    No other changes right now.    You will be following up with Bessie Pompa (diabetes ed) in one week and again in two weeks to scan the continuous glucose monitor.  We'll make appropriate changes in your diabetes meds depending on these results.    I am also doing some blood work to assess how much insulin you make on your own.    Please follow up with me in one month.    Idalia Boudreaux NP  Endocrinology            Follow-ups after your visit        Additional Services     DIABETES EDUCATOR REFERRAL       DIABETES SELF MANAGEMENT TRAINING (DSMT)      Your provider has referred you to Diabetes Education: FM[facility]: Diabetes Education - All [facility] [phone]   [email or web address]     If an urgent visit is needed or A1C is above 12, Care Team to call the Diabetes  Education Team at [phone] or send an In Basket message to the Diabetes Education Pool (P DIAB ED-PATIENT CARE).    A  will call you to make your appointment. If it has been more than 3 business days since your referral was placed, please call the above phone number to  schedule.    Type of training and number of hours: Previous Diagnosis: Follow-up DSMT - 2 hours.    Medicare covers: 10 hours of initial DSMT in 12 month period from the time of first visit, plus 2 hours of follow-up DSMT annually, and additional hours as requested for insulin training.    Diabetes Type: Type 2 - On Insulin             Diabetes Co-Morbidities: dyslipidemia and hypertension               A1C Goal:  <7.0       A1C is: Lab Results       Component                Value               Date                       A1C                      10.1                01/24/2018              Diabetes Education Topics: Diagnostic Continuous Glucose Monitoring     Special Educational Needs Requiring Individual DSMT: None       MEDICAL NUTRITION THERAPY (MNT) for Diabetes    Medical Nutrition Therapy with a Registered Dietitian can be provided in coordination with Diabetes Self-Management Training to assist in achieving optimal diabetes management.    MNT Type and Hours: Do not initiate MNT at this time.                       Medicare will cover: 3 hours initial MNT in 12 month period after first visit, plus 2 hours of follow-up MNT annually    Please be aware that coverage of these services is subject to the terms and limitations of your health insurance plan.  Call member services at your health plan to determine Diabetes Self-Management Training (Codes  &amp; ) and Medical Nutrition Therapy (Codes 48839 & 55169) benefits and ask which blood glucose monitor brands are covered by your plan.  Please bring the following with you to your appointment:    (1)  List of current medications   (2)  List of Blood Glucose Monitor brands that are covered by your insurance plan  (3)  Blood Glucose Monitor and log book  (4)   Food records for the 3 days prior to your visit    The Certified Diabetes Educator may make diabetes medication adjustments per the CDE Protocol and Collaborative Practice Agreement.             OPHTHALMOLOGY ADULT REFERRAL       Your provider has referred you to:  [place]     Please be aware that coverage of these services is subject to the terms and limitations of your health insurance plan.  Call member services at your health plan with any benefit or coverage questions.      Please bring the following to your appointment:  >>   Any x-rays, CTs or MRIs which have been performed.  Contact the facility where they were done to arrange for  prior to your scheduled appointment.  Any new CT, MRI or other procedures ordered by your specialist must be performed at a [place] facility or coordinated by your clinic's referral office.    >>   List of current medications   >>   This referral request   >>   Any documents/labs given to you for this referral                  Your next 10 appointments already scheduled     Mar 12, 2018  1:00 PM CDT   HST  with BED 7  SLEEP   [facility] ([facility])    [address]  [place] [address]   [phone]            Mar 13, 2018 10:30 AM CDT   HST Drop Off with  SLEEP CENTER DME   [facility] ([facility])    [address]  [facility] [address]   [phone]            Mar 21, 2018  5:00 PM CDT   Return Sleep Patient with Mir De Jesus MD   [facility] ([facility]    [address]  [facility] [id number] [phone]              Future tests that were ordered for you today     Open Future Orders        Priority Expected Expires Ordered    GLUCOSE MONITORING CONTINUOUS, >=72 HR Routine  3/6/2019 3/6/2018            Who to contact     If you have questions or need follow up information about today's clinic visit or your schedule please contact [facility] directly at [phone].  Normal or non-critical lab and imaging results will be  communicated to you by [employer], letter or phone within 4 business days after the clinic has received the results. If you do not hear from us within 7 days, please contact the clinic through [employer] or phone. If you have a critical or abnormal lab result, we will notify you by phone as soon as possible.  Submit refill requests through [employer] or call your pharmacy and they will forward the refill request to us. Please allow 3 business days for your refill to be completed.          Additional Information About Your Visit        [employer] Information     [employer] gives you secure access to your electronic health record. If you see a primary care provider, you can also send messages to your care team and make appointments. If you have questions, please call your primary care clinic.  If you do not have a primary care provider, please call [phone] and they will assist you.        Care EveryWhere ID     This is your Care EveryWhere ID. This could be used by other organizations to access your [place] medical records  [id number]        Your Vitals Were     Pulse Temperature Respirations Last Period BMI (Body Mass Index)       93 98.3  F (36.8  C) (Oral) 16 01/18/2013 37.44 kg/m2        Blood Pressure from Last 3 Encounters:   03/06/18 138/90   02/27/18 135/90   02/21/18 128/78    Weight from Last 3 Encounters:   03/06/18 102.1 kg (225 lb)   02/27/18 99.8 kg (220 lb)   02/21/18 100 kg (220 lb 8 oz)              We Performed the Following     C-peptide     DIABETES EDUCATOR REFERRAL     Glucose     OPHTHALMOLOGY ADULT REFERRAL          Today's Medication Changes          These changes are accurate as of 3/6/18 11:29 AM.  If you have any questions, ask your nurse or doctor.               Start taking these medicines.        Dose/Directions    insulin degludec 200 UNIT/ML pen   Commonly known as:  TRESIBA   Used for:  Uncontrolled type 2 diabetes mellitus without complication, with long-term current use of insulin (H)    Started by:  Idalia Boudreaux APRN CNP        Dose:  72 Units   Inject 72 Units Subcutaneous daily   Quantity:  18 mL   Refills:  3         These medicines have changed or have updated prescriptions.        Dose/Directions    BASAGLAR 100 UNIT/ML injection   This may have changed:  See the new instructions.   Used for:  Uncontrolled type 2 diabetes mellitus without complication, with long-term current use of insulin (H)        Inject subcutaneously 60  units daily   Quantity:  60 mL   Refills:  0            Where to get your medicines      These medications were sent to [facility] - [address]   [address], [place] [address]     Phone:  [phone]     insulin degludec 200 UNIT/ML pen                Primary Care Provider Office Phone # Fax #    Heidy Jama [phone]       [address] [place] [address]        Equal Access to Services     [facility]: Hadii holland das hadasho Soomaali, waaxda luqadaha, qaybta kaalmada adeegyada, eulalia negro haywilly black . So [facility] [phone].    ATENCIÓN: Si habla español, tiene a stallworth disposición servicios gratuitos de asistencia lingüística. [facility] [phone].    We comply with applicable federal civil rights laws and [place] laws. We do not discriminate on the basis of race, color, national origin, age, disability, sex, sexual orientation, or gender identity.            Thank you!     Thank you for choosing [facility]  for your care. Our goal is always to provide you with excellent care. Hearing back from our patients is one way we can continue to improve our services. Please take a few minutes to complete the written survey that you may receive in the mail after your visit with us. Thank you!             Your Updated Medication List - Protect others around you: Learn how to safely use, store and throw away your medicines at  www.disposemymeds.org.          This list is accurate as of 3/6/18 11:29 AM.  Always use your most recent med list.                   Brand Name Dispense Instructions for use Diagnosis    albiglutide 50 MG pen    TANZEUM    12 each    Inject 50 mg Subcutaneous once a week    Uncontrolled type 2 diabetes mellitus without complication, with long-term current use of insulin (H)       albuterol 108 (90 BASE) MCG/ACT Inhaler    VENTOLIN HFA    1 Inhaler    Inhale 1-2 puffs into the lungs every 4 hours as needed    Routine general medical examination at a health care facility       atorvastatin 10 MG tablet    LIPITOR    30 tablet    Take 1 tablet (10 mg) by mouth daily    Uncontrolled type 2 diabetes mellitus without complication, with long-term current use of insulin (H), Hyperlipidemia LDL goal <100       BASAGLAR 100 UNIT/ML injection     60 mL    Inject subcutaneously 60  units daily    Uncontrolled type 2 diabetes mellitus without complication, with long-term current use of insulin (H)       beclomethasone 40 MCG/ACT Inhaler    QVAR    1 Inhaler    Inhale 2 puffs into the lungs 2 times daily    Mild persistent asthma without complication       * blood glucose monitoring meter device kit    no brand specified    1 kit    Use to test blood sugar 2 times daily or as directed. One touch or as covered by insurance.    Type 2 diabetes mellitus with stage 1 chronic kidney disease, without long-term current use of insulin (H)       * blood glucose monitoring meter device kit     1 kit    Check blood sugar two times daily or as directed    Uncontrolled type 2 diabetes mellitus without complication, with long-term current use of insulin (H)       blood glucose monitoring test strip    no brand specified    200 strip    Use to test blood sugars 2 times daily or as directed. One touch or as covered by insurance    Type 2 diabetes mellitus with stage 1 chronic kidney disease, without long-term current use of insulin (H)        hydrochlorothiazide 25 MG tablet    HYDRODIURIL    30 tablet    Take 1 tablet (25 mg) by mouth every morning    Essential hypertension, benign, Uncontrolled type 2 diabetes mellitus without complication, with long-term current use of insulin (H)       insulin degludec 200 UNIT/ML pen    TRESIBA    18 mL    Inject 72 Units Subcutaneous daily    Uncontrolled type 2 diabetes mellitus without complication, with long-term current use of insulin (H)       losartan 100 MG tablet    COZAAR    30 tablet    Take 1 tablet (100 mg) by mouth daily    Uncontrolled type 2 diabetes mellitus without complication, with long-term current use of insulin (H), Essential hypertension, benign       metFORMIN 500 MG 24 hr tablet    GLUCOPHAGE-XR    120 tablet    Take 4 tablets (2,000 mg) by mouth daily (with dinner)    Uncontrolled type 2 diabetes mellitus without complication, with long-term current use of insulin (H)       sertraline 50 MG tablet    ZOLOFT    30 tablet    Take 1 tablet (50 mg) by mouth daily    Adjustment disorder with mixed anxiety and depressed mood       ULTICARE MINI 31G X 6 MM   Generic drug:  insulin pen needle     600 each    Use 10 pen needles daily or as directed.    Type 2 diabetes mellitus, uncontrolled (H)       * Notice:  This list has 2 medication(s) that are the same as other medications prescribed for you. Read the directions carefully, and ask your doctor or other care provider to review them with you.

## 2018-03-06 NOTE — NURSING NOTE
"Chief Complaint   Patient presents with     Diabetes       Initial /90 (BP Location: Left arm, Patient Position: Chair, Cuff Size: Adult Large)  Pulse 93  Temp 98.3  F (36.8  C) (Oral)  Resp 16  Wt 225 lb (102.1 kg)  LMP 01/18/2013  BMI 37.44 kg/m2 Estimated body mass index is 37.44 kg/(m^2) as calculated from the following:    Height as of 2/27/18: 5' 5\" (1.651 m).    Weight as of this encounter: 225 lb (102.1 kg).  Medication Reconciliation: complete    "

## 2018-03-06 NOTE — LETTER
3/6/2018         RE: Lala George  [address]  [facility] [address]        Dear Colleague,    Thank you for referring your patient, Lala George, to the [facility]. Please see a copy of my visit note below.    Name: Lala George  Seen at the request of Heidy Jama for Diabetes.  HPI:  Lala George is a 47 year old female who presents for the evaluation/management of:    1. Type 2 DM:  Originally diagnosed: 12/2014.  Glucose slightly elevated 109-113 the year prior to diagnosis.  Glucose at the time of diagnosis was 317.    Current Regimen: Metformin XR 2000 mg/day, Tanzeum 50 mg weekly, Basaglar 72 units qd  Initially treated with Metformin, Lantus insulin was added a short time after diagnosis. Lantus was later changed to Basaglar due to insurance.  Was briefly treated with glipizide in 2015, for about 2-3 months, discontinued due to ineffectiveness  Trulicity was added 4/2017, this was later changed to Tanzeum due to insurance formulary  BS checks: qd, after dinner.    Meter Download: Did not bring meter.  Reports blood sugars are in the 200's, 215-265.    Works night shift 10 pm to 6 am, Tuesday - Sunday.    Complications:   Diabetes Complications  Description / Detail    Diabetic Retinopathy  No retinopathy, last eye exam 2015.   CAD / PAD  No   Neuropathy  No   Nephropathy / Microalbuminuria  Elevated urine microalbumin x 1   Gastroparesis  No   Hypoglycemia Unawareness  No     2. Hypertension: Blood Pressure today:   BP Readings from Last 3 Encounters:   03/20/18 134/86   03/06/18 138/90   02/27/18 135/90   .  Blood pressure medications include losartan 100 mg qd, HCTZ 25 mg qd.  Takes medications everyday without forgetting a dose.  Denies feeling lightheaded or dizzy.    3. Hyperlipidemia: Takes atorvastatin 10 mg qd for lipid control.  Denies muscle aches of pains.       4. Prevention:  Flu Shot- 9/2017  Pneumovax- 3/2/2015  Opthalmology-Yes: overdue, referral  provided.  Dental-recommend semiannually  ASA-No  Smoking- No  PMH/PSH:  Past Medical History:   Diagnosis Date     BENIGN HYPERTENSION 2/15/2006     Mild persistent asthma      Past Surgical History:   Procedure Laterality Date     NO HISTORY OF SURGERY       Family Hx:  Family History   Problem Relation Age of Onset     Breast Cancer Mother 30     first diagnosed in her early 30's     Hypertension Mother      DIABETES Mother      C.A.D. Mother      CHF 50s ,pacemaker late 50s, CABG 60s     C.A.D. Maternal Aunt      60s     Glaucoma No family hx of      Macular Degeneration No family hx of      Thyroid disease: No         DM2: Yes: mother and one sibling         Autoimmune: DM1, SLE, RA, Vitiligo No    Social Hx:  Social History     Social History     Marital status: Single     Spouse name: N/A     Number of children: N/A     Years of education: N/A     Occupational History      Other     part time, [employer]     Social History Main Topics     Smoking status: Never Smoker     Smokeless tobacco: Never Used     Alcohol use Yes      Comment: rare--one to two per week     Drug use: No     Sexual activity: No     Other Topics Concern     Not on file     Social History Narrative          MEDICATIONS:  has a current medication list which includes the following prescription(s): sertraline, metformin, atorvastatin, losartan, hydrochlorothiazide, albiglutide, blood glucose monitoring, ulticare mini, blood glucose monitoring, blood glucose monitoring, beclomethasone, albuterol, continuous blood glucose monitoring, freestyle estiven reader, humalog kwikpen, insulin degludec, and basaglar.    ROS     ROS: 10 point ROS neg other than the symptoms noted above in the HPI.    Physical Exam   VS: /90 (BP Location: Left arm, Patient Position: Chair, Cuff Size: Adult Large)  Pulse 93  Temp 98.3  F (36.8  C) (Oral)  Resp 16  Wt 102.1 kg (225 lb)  LMP 01/18/2013  BMI 37.44 kg/m2  GENERAL: AXOX3, NAD, well dressed, answering  questions appropriately, appears stated age.  HEENT:  no exophthalmos, no proptosis, EOMI, no lig lag, no retraction  CV: RRR, no rubs, gallops, no murmurs  LUNGS: CTAB, no wheezes, rales, or rhonchi  ABDOMEN: soft, nontender, nondistended  EXTREMITIES: no edema, +pulses, no rashes, no lesions  NEUROLOGY: CN grossly intact, no tremors  MSK: grossly intact  SKIN: no rashes, no lesions    LABS:  A1c:   Component      Latest Ref Rng & Units 5/18/2016 1/4/2017 4/10/2017 1/24/2018   Hemoglobin A1C      4.3 - 6.0 % 14.6 (H) 13.3 (H) 13.0 (H) 10.1 (H)     Basic Metabolic Panel:  !COMPREHENSIVE Latest Ref Rng & Units 1/24/2018   SODIUM 133 - 144 mmol/L 136   POTASSIUM 3.4 - 5.3 mmol/L 4.1   CHLORIDE 94 - 109 mmol/L 101   BUN 7 - 30 mg/dL 18   Creatinine 0.52 - 1.04 mg/dL 0.73   Glucose 70 - 99 mg/dL 344 (H)   ANION GAP 3 - 14 mmol/L 6   CALCIUM 8.5 - 10.1 mg/dL 9.0   ALBUMIN 3.4 - 5.0 g/dL 3.9   PROTEIN, TOTAL 6.8 - 8.8 g/dL 7.1     LFTS/Cholesterol Panel:  !LIPID/HEPATIC Latest Ref Rng & Units 2/21/2018   CHOLESTEROL <200 mg/dL 174   TRIGLYCERIDES <150 mg/dL 138   HDL CHOLESTEROL >49 mg/dL 62   LDL CHOLESTEROL, CALCULATED <100 mg/dL 84   VLDL-CHOLESTEROL 0 - 30 mg/dL    NON HDL CHOLESTEROL <130 mg/dL 112     !LIPID/HEPATIC Latest Ref Rng & Units 12/31/2014 4/1/2015 5/18/2016   AST 0 - 45 U/L 96 (H) 67 (H) 124 (H)   ALT 0 - 50 U/L 165 (H) 120 (H) 152 (H)     !LIPID/HEPATIC Latest Ref Rng & Units 1/4/2017 4/10/2017 1/24/2018   AST 0 - 45 U/L 68 (H) 35 28   ALT 0 - 50 U/L 104 (H) 70 (H) 37     Thyroid Function:   !THYROID Latest Ref Rng & Units 1/24/2018 7/10/2017 5/18/2016   TSH 0.40 - 4.00 mU/L 2.40 2.88 3.09     Urine MicroAlbumin:  Component    Latest Ref Rng & Units 1/21/2015 5/18/2016 1/24/2018   Creatinine Urine    mg/dL 100 73 41   Albumin Urine mg/L    mg/L 22 35 6   Albumin Urine mg/g Cr    0 - 25 mg/g Cr 22.11 47.94 (H) 14.99     Vitamin D:    All pertinent notes, labs, and images personally reviewed by me.      A/P  Ms.Shari George is a 47 year old here for the evaluation/management of diabetes:    1. DM2 - Uncontrolled.  Check glucose and cpeptide.  Obtain diagnostic CGM.  Referral provided for eye exam  There is some variability among people, most will usually develop symptoms suggestive of hypoglycemia when blood glucose levels are lowered to the mid 60's. The first set of symptoms are called adrenergic. Patients may experience any of the following nervousness, sweating, intense hunger, trembling, weakness, palpitations, and difficulty speaking.   The acute management of hypoglycemia involves the rapid delivery of a source of easily absorbed sugar. Regular soda, juice, lifesavers, table sugar, are good options. 15 grams of glucose is the dose that is given, followed by an assessment of symptoms and a blood glucose check if possible. If after 10 minutes there is no improvement, another 10-15 grams should be given. This can be repeated up to three times. The equivalency of 10-15 grams of glucose (approximate servings) are: 3-5 hard candies, 3 teaspoons of sugar, or 1/2 cup of regular soda or juice.      2. Hypertension - Marginal.  Needs better control.  Recommend he see his PCP for further treatment options.    3. Hyperlipidemia - On statin therapy.      Labs ordered today:   Orders Placed This Encounter   Procedures     GLUCOSE MONITORING CONTINUOUS, >=72 HR     Glucose     C-peptide     Hemoglobin A1c     JUST IN CASE     DIABETES EDUCATOR REFERRAL     OPHTHALMOLOGY ADULT REFERRAL       Radiology/Consults ordered today: DIABETES EDUCATOR REFERRAL  OPHTHALMOLOGY ADULT REFERRAL    All questions were answered.  The patient indicates understanding of the above issues and agrees with the plan set forth.  Total face to face time greater than or equal to 45 minutes.       Follow-up:  1 month    Idalia Boudreaux NP  Endocrinology  [facility]  CC:       Nella Reeves c-peptide (a measure of your body's insulin  production) was a little low compared to a glucose of 221 but you are make some insulin on your own.  We'll discuss this in more detail at your next appointment.  Here's a copy of the results for your records.  Idalia Boudreaux NP  Endocrinology     Again, thank you for allowing me to participate in the care of your patient.        Sincerely,        EDMOND Velez CNP

## 2018-03-06 NOTE — TELEPHONE ENCOUNTER
Pt was seen today and I scheduled her to return in 1 mo per LS. I also scheduled a lab only appt for 15 min before her appt w/ LS on 4/3/18.  In speaking with LS, pt will be too early for us to do her HgbA1c on 4/3/18 so LS would like pt to cancel the appts on 4/3/18 and reschedule closer to 4/24/18 when her labs are due.  Please help pt reschedule.  LMOM for pt to cb to Carlos.  Shari Schoenberger, CMA

## 2018-03-06 NOTE — Clinical Note
Hi Dr. Jama, Pt seen for follow up. Also placed a continuous glucose monitor per Endo request. Will send the upcoming CGM reports to you.   Thanks! Bessie Loomis RD, CDE Diabetes
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
## 2018-03-06 NOTE — PATIENT INSTRUCTIONS
When you use up your current supply of Basaglar, I want you to start Tresiba 72 units once daily.    Remember you can take a missed dose of Tresiba the next day and still take your usual dose of Tresiba that same day as long as there is at least 8 hours between the two injections.    Activate the Tresiba savings card and present it to the pharmacy when you pick the Tresiba up.    No other changes right now.    You will be following up with Bessie or Aletha (diabetes ed) in one week and again in two weeks to scan the continuous glucose monitor.  We'll make appropriate changes in your diabetes meds depending on these results.    I am also doing some blood work to assess how much insulin you make on your own.    Please follow up with me in one month.    Idalia Boudreaux NP  Endocrinology

## 2018-03-07 LAB — C PEPTIDE SERPL-MCNC: 2.7 NG/ML (ref 0.9–6.9)

## 2018-03-12 ENCOUNTER — OFFICE VISIT (OUTPATIENT)
Dept: SLEEP MEDICINE | Facility: CLINIC | Age: 48
End: 2018-03-12
Payer: COMMERCIAL

## 2018-03-12 ENCOUNTER — ALLIED HEALTH/NURSE VISIT (OUTPATIENT)
Dept: EDUCATION SERVICES | Facility: CLINIC | Age: 48
End: 2018-03-12
Payer: COMMERCIAL

## 2018-03-12 DIAGNOSIS — G47.10 HYPERSOMNOLENCE: ICD-10-CM

## 2018-03-12 PROCEDURE — G0399 HOME SLEEP TEST/TYPE 3 PORTA: HCPCS | Performed by: INTERNAL MEDICINE

## 2018-03-12 PROCEDURE — G0108 DIAB MANAGE TRN  PER INDIV: HCPCS | Performed by: DIETITIAN, REGISTERED

## 2018-03-12 RX ORDER — INSULIN GLARGINE 100 [IU]/ML
INJECTION, SOLUTION SUBCUTANEOUS
Qty: 60 ML | Refills: 0
Start: 2018-03-12 | End: 2018-04-24

## 2018-03-12 RX ORDER — INSULIN LISPRO 100 [IU]/ML
10 INJECTION, SOLUTION INTRAVENOUS; SUBCUTANEOUS
Qty: 15 ML | Refills: 1 | Status: SHIPPED | OUTPATIENT
Start: 2018-03-12 | End: 2018-03-13

## 2018-03-12 NOTE — PROGRESS NOTES
Patient instructed on HST use. Patient demonstrated and verbalized knowledge of use. Device programmed to start at 11pm. Device will be returned tomorrow before noon.    Bailee Beavers  [facility] - Specialist

## 2018-03-12 NOTE — MR AVS SNAPSHOT
"              After Visit Summary   3/12/2018    Lala George    MRN: [id number]           Patient Information     Date Of Birth          1970        Visit Information        Provider Department      3/12/2018 1:00 PM BED 7 SH SLEEP [facility]        Today's Diagnoses     Hypersomnolence           Follow-ups after your visit        Your next 10 appointments already scheduled     Mar 13, 2018 10:30 AM CDT   HST Drop Off with  SLEEP CENTER DME   [facility] ([facility])    [address]  [address]  [place] [address]   [phone]            Mar 13, 2018  1:30 PM CDT   (Arrive by 1:15 PM)   MA SCREENING DIGITAL BILATERAL with EAMA1   [facility]    [address] ,[address]  [place] [address] [phone]           Do not use any powder, lotion or deodorant under your arms or on your breast. If you do, we will ask you to remove it before your exam.  Wear comfortable, two-piece clothing.  If you have any allergies, tell your care team.  Bring any previous mammograms from other facilities or have them mailed to the breast center. Three-dimensional (3D) mammograms are available at [place] locations in [facility], [place], [place], [facility], [place], [place], and [place]. [facility] locations include [place] and [facility] in [place]. Benefits of 3D mammograms include: - Improved rate of cancer detection - Decreases your chance of having to go back for more tests, which means fewer: - \"False-positive\" results (This means that there is an abnormal area but it isn't cancer.) - Invasive testing procedures, such as a biopsy or surgery - Can provide clearer images of the breast if you have dense breast tissue. 3D mammography is an optional exam that anyone can have with a 2D mammogram. It doesn't replace or take the place of a 2D mammogram. 2D mammograms remain an " effective screening test for all women.  Not all insurance companies cover the cost of a 3D mammogram. Check with your insurance.            Mar 19, 2018  1:30 PM CDT   Diabetic Education with Bessie Loomis   [facility] ([facility])    [address] [address] [phone]            Mar 21, 2018  5:00 PM CDT   Return Sleep Patient with Mir De Jesus MD   [facility]    [address]  [facility] [id number] [phone]            Apr 23, 2018  2:00 PM CDT   Return Visit with EDMOND Velez CNP   [facility] ([facility])    [address] [place] [address] [phone]            Apr 23, 2018  2:00 PM CDT   LAB with CR LAB   [facility] ([facility])    [address] [place] [address] [phone]           Please do not eat 10-12 hours before your appointment if you are coming in fasting for labs on lipids, cholesterol, or glucose (sugar). This does not apply to pregnant women. Water, hot tea and black coffee (with nothing added) are okay. Do not drink other fluids, diet soda or chew gum.              Who to contact     If you have questions or need follow up information about today's clinic visit or your schedule please contact [facility] [place] [facility] directly at [phone].  Normal or non-critical lab and imaging results will be communicated to you by MyChart, letter or phone within 4 business days after the clinic has received the results. If you do not hear from us within 7 days, please contact the clinic through MyChart or phone. If you have a critical or abnormal lab result, we will notify you by phone as soon as possible.  Submit refill requests through quickhuddle or call your pharmacy and they will forward the refill request to  us. Please allow 3 business days for your refill to be completed.          Additional Information About Your Visit        MyChart Information     [employer] gives you secure access to your electronic health record. If you see a primary care provider, you can also send messages to your care team and make appointments. If you have questions, please call your primary care clinic.  If you do not have a primary care provider, please call [phone] and they will assist you.        Care EveryWhere ID     This is your Care EveryWhere ID. This could be used by other organizations to access your [place] medical records  [id number]        Your Vitals Were     Last Period                   01/18/2013            Blood Pressure from Last 3 Encounters:   03/06/18 138/90   02/27/18 135/90   02/21/18 128/78    Weight from Last 3 Encounters:   03/06/18 102.1 kg (225 lb)   02/27/18 99.8 kg (220 lb)   02/21/18 100 kg (220 lb 8 oz)              We Performed the Following     HST-Home Sleep Apnea Test          Today's Medication Changes          These changes are accurate as of 3/12/18  1:35 PM.  If you have any questions, ask your nurse or doctor.               These medicines have changed or have updated prescriptions.        Dose/Directions    BASAGLAR 100 UNIT/ML injection   This may have changed:  See the new instructions.   Used for:  Uncontrolled type 2 diabetes mellitus without complication, with long-term current use of insulin (H)        Inject subcutaneously 60  units daily   Quantity:  60 mL   Refills:  0                Primary Care Provider Office Phone # Fax #    Heidy Jama [phone]       [facility] [place] [address]        Equal Access to Services     [facility]: Hadmonique Caro, wafaizan greenberg, qaybandree kaaleulalia brown. So [facility] [phone].    ATENCIÓN: Si habla español, tiene a stallworth disposición servicios gratuitos  de asistencia lingüística. Niru taylor [phone].    We comply with applicable federal civil rights laws and [place] laws. We do not discriminate on the basis of race, color, national origin, age, disability, sex, sexual orientation, or gender identity.            Thank you!     Thank you for choosing [facility]  for your care. Our goal is always to provide you with excellent care. Hearing back from our patients is one way we can continue to improve our services. Please take a few minutes to complete the written survey that you may receive in the mail after your visit with us. Thank you!             Your Updated Medication List - Protect others around you: Learn how to safely use, store and throw away your medicines at www.disposemymeds.org.          This list is accurate as of 3/12/18  1:35 PM.  Always use your most recent med list.                   Brand Name Dispense Instructions for use Diagnosis    albiglutide 50 MG pen    TANZEUM    12 each    Inject 50 mg Subcutaneous once a week    Uncontrolled type 2 diabetes mellitus without complication, with long-term current use of insulin (H)       albuterol 108 (90 BASE) MCG/ACT Inhaler    VENTOLIN HFA    1 Inhaler    Inhale 1-2 puffs into the lungs every 4 hours as needed    Routine general medical examination at a health care facility       atorvastatin 10 MG tablet    LIPITOR    30 tablet    Take 1 tablet (10 mg) by mouth daily    Uncontrolled type 2 diabetes mellitus without complication, with long-term current use of insulin (H), Hyperlipidemia LDL goal <100       BASAGLAR 100 UNIT/ML injection     60 mL    Inject subcutaneously 60  units daily    Uncontrolled type 2 diabetes mellitus without complication, with long-term current use of insulin (H)       beclomethasone 40 MCG/ACT Inhaler    QVAR    1 Inhaler    Inhale 2 puffs into the lungs 2 times daily    Mild persistent asthma without complication       * blood glucose monitoring meter  device kit    no brand specified    1 kit    Use to test blood sugar 2 times daily or as directed. One touch or as covered by insurance.    Type 2 diabetes mellitus with stage 1 chronic kidney disease, without long-term current use of insulin (H)       * blood glucose monitoring meter device kit     1 kit    Check blood sugar two times daily or as directed    Uncontrolled type 2 diabetes mellitus without complication, with long-term current use of insulin (H)       blood glucose monitoring test strip    no brand specified    200 strip    Use to test blood sugars 2 times daily or as directed. One touch or as covered by insurance    Type 2 diabetes mellitus with stage 1 chronic kidney disease, without long-term current use of insulin (H)       hydrochlorothiazide 25 MG tablet    HYDRODIURIL    30 tablet    Take 1 tablet (25 mg) by mouth every morning    Essential hypertension, benign, Uncontrolled type 2 diabetes mellitus without complication, with long-term current use of insulin (H)       insulin degludec 200 UNIT/ML pen    TRESIBA    18 mL    Inject 72 Units Subcutaneous daily    Uncontrolled type 2 diabetes mellitus without complication, with long-term current use of insulin (H)       losartan 100 MG tablet    COZAAR    30 tablet    Take 1 tablet (100 mg) by mouth daily    Uncontrolled type 2 diabetes mellitus without complication, with long-term current use of insulin (H), Essential hypertension, benign       metFORMIN 500 MG 24 hr tablet    GLUCOPHAGE-XR    120 tablet    Take 4 tablets (2,000 mg) by mouth daily (with dinner)    Uncontrolled type 2 diabetes mellitus without complication, with long-term current use of insulin (H)       sertraline 50 MG tablet    ZOLOFT    30 tablet    Take 1 tablet (50 mg) by mouth daily    Adjustment disorder with mixed anxiety and depressed mood       ULTICARE MINI 31G X 6 MM   Generic drug:  insulin pen needle     600 each    Use 10 pen needles daily or as directed.    Type 2  diabetes mellitus, uncontrolled (H)       * Notice:  This list has 2 medication(s) that are the same as other medications prescribed for you. Read the directions carefully, and ask your doctor or other care provider to review them with you.

## 2018-03-12 NOTE — LETTER
3/12/2018         RE: Lala George  [address]  [place] [address]        Dear Colleague,    Thank you for referring your patient, Lala George, to the [facility]. Please see a copy of my visit note below.    LibrePro Continuous Glucose Monitor Interpretation   CGM Download: Week 1    Patient History:   1. Type of Diabetes: Type 2 diabetes  2. Duration of diabetes or year of diagnosis:   3. Current treatment regimen:    Diabetes Medication(s)     Biguanides Sig    metFORMIN (GLUCOPHAGE-XR) 500 MG 24 hr tablet Take 4 tablets (2,000 mg) by mouth daily (with dinner)    Insulin Sig    insulin degludec (TRESIBA) 200 UNIT/ML pen Inject 72 Units Subcutaneous daily    insulin glargine (BASAGLAR KWIKPEN) 100 UNIT/ML injection Inject subcutaneously 60  units daily     Patient taking differently:  Inject subcutaneously 68 units daily    Incretin Mimetic Agents (GLP-1 Receptor Agonists) Sig    albiglutide (TANZEUM) 50 MG pen Inject 50 mg Subcutaneous once a week        Problems taking diabetes medications regularly? Occasionally misses her insulin  Is using up Basaglar before starting the Tresiba  Side effects? No   4. Most recent A1c values:  Lab Results   Component Value Date    A1C 10.1 2018    A1C 13.0 04/10/2017    A1C 13.3 2017     5. Indication/s for LibrePro study: Unexplained fluctuations in glucose values.        Statistics:   1. Sensor worn for 7 days.  2. Statistics:    Percent in target is: 13%  Percent above target is: 87%  (percent above 250 mg/dl: 61%)  Percent below target is: 0%  3.  Estimated A1c: 11.2%                        Data evaluation:   Ambulatory Glucose Profile (AGP) shows the followin. Consistent day-to-day patterns noted: pattern of daytime hyperglycemia, pattern of nocturnal hyperglycemia.  2. Average glucose: 276 mg/dL.  SD (Standard Deviation): 74.1  3. Hypoglycemia: none    Patient's Logbook shows the following:   "Pt did not bring log with her  Carbohydrate counting: reports \"trying to be more careful with portions\", aims for 3-4 carb choices per meal  Medication and/or insulin dosing is: as directed    Education provided today:  AADE Self-Care Behaviors:  Monitoring: review of BG pattern/trends, note significant post meal hyperglycemia  Taking Medication: action of prescribed medication, discussed probable need for meal time insulin, pt agreeable    Opportunities for ongoing education and support in diabetes-self management were discussed.    Pt verbalized understanding of concepts discussed and recommendations provided today.       Educational and other materials:  Copy of CGM report    Assessment and Plan:  Per discussion with Idalia Boudreaux NP:  1. Start Humalog 10 units before each meal  2. Reduce Basaglar (or Tresiba) to 50 units once daily  3. Keep carb intake consistent at 3-4 choices per meal. Bring log sheet to next appt.  3. CGM download one week     Called patient with the above instructions (left VM) and sent via [employer]Say.     Bessie Loomis RD, LD, CDE  Diabetes     Time Spent: 30 minutes  Any diabetes medication dose changes were made via the CDE Protocol and Collaborative Practice Agreement with the patient's endocrinology provider. A copy of this encounter was shared with the provider.      "

## 2018-03-12 NOTE — PROGRESS NOTES
"LibrePro Continuous Glucose Monitor Interpretation   CGM Download: Week 1    Patient History:   1. Type of Diabetes: Type 2 diabetes  2. Duration of diabetes or year of diagnosis:   3. Current treatment regimen:    Diabetes Medication(s)     Biguanides Sig    metFORMIN (GLUCOPHAGE-XR) 500 MG 24 hr tablet Take 4 tablets (2,000 mg) by mouth daily (with dinner)    Insulin Sig    insulin degludec (TRESIBA) 200 UNIT/ML pen Inject 72 Units Subcutaneous daily    insulin glargine (BASAGLAR KWIKPEN) 100 UNIT/ML injection Inject subcutaneously 60  units daily     Patient taking differently:  Inject subcutaneously 68 units daily    Incretin Mimetic Agents (GLP-1 Receptor Agonists) Sig    albiglutide (TANZEUM) 50 MG pen Inject 50 mg Subcutaneous once a week        Problems taking diabetes medications regularly? Occasionally misses her insulin  Is using up Basaglar before starting the Tresiba  Side effects? No   4. Most recent A1c values:  Lab Results   Component Value Date    A1C 10.1 2018    A1C 13.0 04/10/2017    A1C 13.3 2017     5. Indication/s for LibrePro study: Unexplained fluctuations in glucose values.        Statistics:   1. Sensor worn for 7 days.  2. Statistics:    Percent in target is: 13%  Percent above target is: 87%  (percent above 250 mg/dl: 61%)  Percent below target is: 0%  3.  Estimated A1c: 11.2%                        Data evaluation:   Ambulatory Glucose Profile (AGP) shows the followin. Consistent day-to-day patterns noted: pattern of daytime hyperglycemia, pattern of nocturnal hyperglycemia.  2. Average glucose: 276 mg/dL.  SD (Standard Deviation): 74.1  3. Hypoglycemia: none    Patient's Logbook shows the following:  Pt did not bring log with her  Carbohydrate counting: reports \"trying to be more careful with portions\", aims for 3-4 carb choices per meal  Medication and/or insulin dosing is: as directed    Education provided today:  AADE Self-Care Behaviors:  Monitoring: " review of BG pattern/trends, note significant post meal hyperglycemia  Taking Medication: action of prescribed medication, discussed probable need for meal time insulin, pt agreeable    Opportunities for ongoing education and support in diabetes-self management were discussed.    Pt verbalized understanding of concepts discussed and recommendations provided today.       Educational and other materials:  Copy of CGM report    Assessment and Plan:  Per discussion with Idalia Boudreaux NP:  1. Start Humalog 10 units before each meal  2. Reduce Basaglar (or Tresiba) to 50 units once daily  3. Keep carb intake consistent at 3-4 choices per meal. Bring log sheet to next appt.  3. CGM download one week     Called patient with the above instructions (left VM) and sent via [email or web address].     Bessie Loomis RD, LD, CDE  Diabetes     Time Spent: 30 minutes  Any diabetes medication dose changes were made via the CDE Protocol and Collaborative Practice Agreement with the patient's endocrinology provider. A copy of this encounter was shared with the provider.

## 2018-03-12 NOTE — PATIENT INSTRUCTIONS
My Diabetes Care Goals:    1. Start Humalog 10 units before each meal  2. Reduce Basaglar (or Tresiba) to 50 units once daily  3. Keep carb intake consistent at 3-4 choices per meal. Bring log sheet to next appt.  3. CGM download one week     Bessie Loomis RD, CDE  Diabetes      Bring blood glucose meter and logbook with you to all doctor and follow-up appointments.     [facility] for the [facility]:  For Your Diabetes Education and Nutrition Appointments Call:  [phone]   For Diabetes Education or Nutrition Related Questions:   Phone: [phone]  E-mail: [email or web address]  Fax: [phone]   If you need a medication refill please contact your pharmacy. Please allow 3 business days for your refills to be completed.    Instructions for emailing the Diabetes Educators    If you need to communicate a non-urgent message to a Diabetes Educator via email, please send to [email or web address].    Please follow the following email guidelines:    Subject line: Secure: your clinic name (example: Secure: [facility])  In the email please include: First name, middle initial, last name and date of birth.    We will be in touch with you within one (1) business day.

## 2018-03-12 NOTE — MR AVS SNAPSHOT
After Visit Summary   3/12/2018    Lala George    MRN: [id number]           Patient Information     Date Of Birth          1970        Visit Information        Provider Department      3/12/2018 10:30 AM Bessie Loomis [facility] [place]        Today's Diagnoses     Uncontrolled type 2 diabetes mellitus without complication, with long-term current use of insulin (H)    -  1      Care Instructions    My Diabetes Care Goals:    1. Start Humalog 10 units before each meal  2. Reduce Basaglar (or Tresiba) to 50 units once daily  3. Keep carb intake consistent at 3-4 choices per meal. Bring log sheet to next appt.  3. CGM download one week     Bessie Loomis RD, CDE  Diabetes      Bring blood glucose meter and logbook with you to all doctor and follow-up appointments.     [facility] for the [facility]:  For Your Diabetes Education and Nutrition Appointments Call:  [phone]   For Diabetes Education or Nutrition Related Questions:   Phone: [phone]  E-mail: [email or web address]  Fax: [phone]   If you need a medication refill please contact your pharmacy. Please allow 3 business days for your refills to be completed.    Instructions for emailing the Diabetes Educators    If you need to communicate a non-urgent message to a Diabetes Educator via email, please send to [email or web address].    Please follow the following email guidelines:    Subject line: Secure: your clinic name (example: Secure: [facility])  In the email please include: First name, middle initial, last name and date of birth.    We will be in touch with you within one (1) business day.             Follow-ups after your visit        Your next 10 appointments already scheduled     Mar 13, 2018 10:30 AM CDT   HST Drop Off with  SLEEP CENTER [facility] ([facility])    [address]  "[facility]  [address]  [facility] [address]   [phone]            Mar 13, 2018  1:30 PM CDT   (Arrive by 1:15 PM)   MA SCREENING DIGITAL BILATERAL with EAMA1   [facility]    [address] ,[address]  [place] [address] [phone]           Do not use any powder, lotion or deodorant under your arms or on your breast. If you do, we will ask you to remove it before your exam.  Wear comfortable, two-piece clothing.  If you have any allergies, tell your care team.  Bring any previous mammograms from other facilities or have them mailed to the breast center. Three-dimensional (3D) mammograms are available at [facility] locations in [facility], [facility], [facility], [facility], and [place]. [facility] locations include [place] and [facility] Center in [place]. Benefits of 3D mammograms include: - Improved rate of cancer detection - Decreases your chance of having to go back for more tests, which means fewer: - \"False-positive\" results (This means that there is an abnormal area but it isn't cancer.) - Invasive testing procedures, such as a biopsy or surgery - Can provide clearer images of the breast if you have dense breast tissue. 3D mammography is an optional exam that anyone can have with a 2D mammogram. It doesn't replace or take the place of a 2D mammogram. 2D mammograms remain an effective screening test for all women.  Not all insurance companies cover the cost of a 3D mammogram. Check with your insurance.            Mar 19, 2018  1:30 PM CDT   Diabetic Education with Bessie Loomis   [facility] [place] ([facility])    [address]  [facility] [id number] [phone]            Mar 21, 2018  5:00 PM CDT   Return Sleep Patient with Mir De Jesus MD   [facility] ([facility])    [address]  [facility] " [address]   [phone]            Apr 23, 2018  2:00 PM CDT   Return Visit with EDMOND Velez CNP   [facility] ([facility])    [address] [place] [address] [phone]            Apr 23, 2018  2:00 PM CDT   LAB with CR LAB   [facility])    [address] [place] [address] [phone]           Please do not eat 10-12 hours before your appointment if you are coming in fasting for labs on lipids, cholesterol, or glucose (sugar). This does not apply to pregnant women. Water, hot tea and black coffee (with nothing added) are okay. Do not drink other fluids, diet soda or chew gum.              Who to contact     If you have questions or need follow up information about today's clinic visit or your schedule please contact [facility] directly at [phone].  Normal or non-critical lab and imaging results will be communicated to you by Crovathart, letter or phone within 4 business days after the clinic has received the results. If you do not hear from us within 7 days, please contact the clinic through [employer] or phone. If you have a critical or abnormal lab result, we will notify you by phone as soon as possible.  Submit refill requests through [employer] or call your pharmacy and they will forward the refill request to us. Please allow 3 business days for your refill to be completed.          Additional Information About Your Visit        C[employer] Information     [employer] gives you secure access to your electronic health record. If you see a primary care provider, you can also send messages to your care team and make appointments. If you have questions, please call your primary care clinic.  If you do not have a primary care provider, please call [phone] and they will assist you.        Care EveryWhere ID     This is your Care EveryWhere ID. This  could be used by other organizations to access your [place] medical records  [id number]        Your Vitals Were     Last Period                   01/18/2013            Blood Pressure from Last 3 Encounters:   03/06/18 138/90   02/27/18 135/90   02/21/18 128/78    Weight from Last 3 Encounters:   03/06/18 102.1 kg (225 lb)   02/27/18 99.8 kg (220 lb)   02/21/18 100 kg (220 lb 8 oz)              We Performed the Following     DIABETES EDUCATION - Individual  []          Today's Medication Changes          These changes are accurate as of 3/12/18  3:22 PM.  If you have any questions, ask your nurse or doctor.               Start taking these medicines.        Dose/Directions    HumaLOG KWIKpen 100 UNIT/ML injection   Used for:  Uncontrolled type 2 diabetes mellitus without complication, with long-term current use of insulin (H)   Generic drug:  insulin lispro   Started by:  Bessie Loomis        Dose:  10 Units   Inject 10 Units Subcutaneous 3 times daily (before meals)   Quantity:  15 mL   Refills:  1         These medicines have changed or have updated prescriptions.        Dose/Directions    BASAGLAR 100 UNIT/ML injection   This may have changed:  See the new instructions.   Used for:  Uncontrolled type 2 diabetes mellitus without complication, with long-term current use of insulin (H)   Changed by:  Bessie Loomis        50 units once daily.   Quantity:  60 mL   Refills:  0       insulin degludec 200 UNIT/ML pen   Commonly known as:  TRESIBA   This may have changed:  how much to take   Used for:  Uncontrolled type 2 diabetes mellitus without complication, with long-term current use of insulin (H)   Changed by:  Bessie Loomis        Dose:  50 Units   Inject 50 Units Subcutaneous daily   Quantity:  18 mL   Refills:  3            Where to get your medicines      Some of these will need a paper prescription and others can be bought over the counter.  Ask your nurse if you have questions.     Bring a paper  prescription for each of these medications     HumaLOG KWIKpen 100 UNIT/ML injection       You don't need a prescription for these medications     BASAGLAR 100 UNIT/ML injection    insulin degludec 200 UNIT/ML pen                Primary Care Provider Office Phone # Fax #    Heidy Jama [phone] [address] [place] [address]        Equal Access to Services     [facility]: Hadii aad ku hadasho Soomaali, waaxda luqadaha, qaybta kaalmada adeegyada, waxay idiin hayaan adeeg kharash la'aan ah. So [facility] [phone].    ATENCIÓN: Si habla español, tiene a stallworth disposición servicios gratuitos de asistencia lingüística. [facility] [phone].    We comply with applicable federal civil rights laws and [place] laws. We do not discriminate on the basis of race, color, national origin, age, disability, sex, sexual orientation, or gender identity.            Thank you!     Thank you for choosing [facility]  for your care. Our goal is always to provide you with excellent care. Hearing back from our patients is one way we can continue to improve our services. Please take a few minutes to complete the written survey that you may receive in the mail after your visit with us. Thank you!             Your Updated Medication List - Protect others around you: Learn how to safely use, store and throw away your medicines at www.disposemymeds.org.          This list is accurate as of 3/12/18  3:22 PM.  Always use your most recent med list.                   Brand Name Dispense Instructions for use Diagnosis    albiglutide 50 MG pen    TANZEUM    12 each    Inject 50 mg Subcutaneous once a week    Uncontrolled type 2 diabetes mellitus without complication, with long-term current use of insulin (H)       albuterol 108 (90 BASE) MCG/ACT Inhaler    VENTOLIN HFA    1 Inhaler    Inhale 1-2 puffs into the lungs every 4 hours as needed    Routine general medical  examination at a health care facility       atorvastatin 10 MG tablet    LIPITOR    30 tablet    Take 1 tablet (10 mg) by mouth daily    Uncontrolled type 2 diabetes mellitus without complication, with long-term current use of insulin (H), Hyperlipidemia LDL goal <100       BASAGLAR 100 UNIT/ML injection     60 mL    50 units once daily.    Uncontrolled type 2 diabetes mellitus without complication, with long-term current use of insulin (H)       beclomethasone 40 MCG/ACT Inhaler    QVAR    1 Inhaler    Inhale 2 puffs into the lungs 2 times daily    Mild persistent asthma without complication       * blood glucose monitoring meter device kit    no brand specified    1 kit    Use to test blood sugar 2 times daily or as directed. One touch or as covered by insurance.    Type 2 diabetes mellitus with stage 1 chronic kidney disease, without long-term current use of insulin (H)       * blood glucose monitoring meter device kit     1 kit    Check blood sugar two times daily or as directed    Uncontrolled type 2 diabetes mellitus without complication, with long-term current use of insulin (H)       blood glucose monitoring test strip    no brand specified    200 strip    Use to test blood sugars 2 times daily or as directed. One touch or as covered by insurance    Type 2 diabetes mellitus with stage 1 chronic kidney disease, without long-term current use of insulin (H)       HumaLOG KWIKpen 100 UNIT/ML injection   Generic drug:  insulin lispro     15 mL    Inject 10 Units Subcutaneous 3 times daily (before meals)    Uncontrolled type 2 diabetes mellitus without complication, with long-term current use of insulin (H)       hydrochlorothiazide 25 MG tablet    HYDRODIURIL    30 tablet    Take 1 tablet (25 mg) by mouth every morning    Essential hypertension, benign, Uncontrolled type 2 diabetes mellitus without complication, with long-term current use of insulin (H)       insulin degludec 200 UNIT/ML pen    TRESIBA    18 mL     Inject 50 Units Subcutaneous daily    Uncontrolled type 2 diabetes mellitus without complication, with long-term current use of insulin (H)       losartan 100 MG tablet    COZAAR    30 tablet    Take 1 tablet (100 mg) by mouth daily    Uncontrolled type 2 diabetes mellitus without complication, with long-term current use of insulin (H), Essential hypertension, benign       metFORMIN 500 MG 24 hr tablet    GLUCOPHAGE-XR    120 tablet    Take 4 tablets (2,000 mg) by mouth daily (with dinner)    Uncontrolled type 2 diabetes mellitus without complication, with long-term current use of insulin (H)       sertraline 50 MG tablet    ZOLOFT    30 tablet    Take 1 tablet (50 mg) by mouth daily    Adjustment disorder with mixed anxiety and depressed mood       ULTICARE MINI 31G X 6 MM   Generic drug:  insulin pen needle     600 each    Use 10 pen needles daily or as directed.    Type 2 diabetes mellitus, uncontrolled (H)       * Notice:  This list has 2 medication(s) that are the same as other medications prescribed for you. Read the directions carefully, and ask your doctor or other care provider to review them with you.

## 2018-03-13 ENCOUNTER — TELEPHONE (OUTPATIENT)
Dept: EDUCATION SERVICES | Facility: CLINIC | Age: 48
End: 2018-03-13

## 2018-03-13 ENCOUNTER — ALLIED HEALTH/NURSE VISIT (OUTPATIENT)
Dept: NUTRITION | Facility: CLINIC | Age: 48
End: 2018-03-13
Payer: COMMERCIAL

## 2018-03-13 ENCOUNTER — DOCUMENTATION ONLY (OUTPATIENT)
Dept: SLEEP MEDICINE | Facility: CLINIC | Age: 48
End: 2018-03-13
Payer: COMMERCIAL

## 2018-03-13 PROCEDURE — 99207 ZZC NO CHARGE LOS: CPT

## 2018-03-13 RX ORDER — INSULIN LISPRO 100 [IU]/ML
10 INJECTION, SOLUTION INTRAVENOUS; SUBCUTANEOUS
Qty: 15 ML | Refills: 1 | Status: SHIPPED | OUTPATIENT
Start: 2018-03-13 | End: 2018-04-24

## 2018-03-13 NOTE — TELEPHONE ENCOUNTER
Called pt.  Scheduled for 1:30 today for CGM insertion/replacement.     Bessie Loomis RD, CDE  Diabetes

## 2018-03-13 NOTE — MR AVS SNAPSHOT
"              After Visit Summary   3/13/2018    Lala George    MRN: [id number]           Patient Information     Date Of Birth          1970        Visit Information        Provider Department      3/13/2018 2:00 PM [facility] N[facility]ITION [facility]        Today's Diagnoses     Uncontrolled type 2 diabetes mellitus without complication, with long-term current use of insulin (H)    -  1       Follow-ups after your visit        Your next 10 appointments already scheduled     Mar 14, 2018  4:40 PM CDT   New Visit with Marianna Magallanes OD   [facility]    [address]  [address]  [place] [address] [phone]            Mar 16, 2018  2:45 PM CDT   (Arrive by 2:30 PM)   MA SCREENING DIGITAL BILATERAL with EAMA1   [facility]    [address] [place] [address],[address]  [place] [address] [phone]           Do not use any powder, lotion or deodorant under your arms or on your breast. If you do, we will ask you to remove it before your exam.  Wear comfortable, two-piece clothing.  If you have any allergies, tell your care team.  Bring any previous mammograms from other facilities or have them mailed to the breast center. Three-dimensional (3D) mammograms are available at [place] locations in [facility], [facility], [facility], [place], [place], and [place]. [facility] locations include [place] and [facility] Center in [place]. Benefits of 3D mammograms include: - Improved rate of cancer detection - Decreases your chance of having to go back for more tests, which means fewer: - \"False-positive\" results (This means that there is an abnormal area but it isn't cancer.) - Invasive testing procedures, such as a biopsy or surgery - Can provide clearer images of the breast if you have dense breast tissue. 3D mammography is an optional exam that anyone can have with a 2D " mammogram. It doesn't replace or take the place of a 2D mammogram. 2D mammograms remain an effective screening test for all women.  Not all insurance companies cover the cost of a 3D mammogram. Check with your insurance.            Mar 19, 2018  1:30 PM CDT   Diabetic Education with Bessie Loomis   [facility] ([facility])    [address] [place] [address]   [phone]            Mar 21, 2018  5:00 PM CDT   Return Sleep Patient with Mir De Jesus MD   [facility] ([facility])    [address] [place]  [facility] [address] [phone]            Apr 23, 2018  2:00 PM CDT   Return Visit with EDMOND Vleez CNP   [facility] ([facility])    [address] [place] [address]   [phone]            Apr 23, 2018  2:00 PM CDT   LAB with CR LAB   [facility] ([facility])    [address] [place] [id number] [phone]           Please do not eat 10-12 hours before your appointment if you are coming in fasting for labs on lipids, cholesterol, or glucose (sugar). This does not apply to pregnant women. Water, hot tea and black coffee (with nothing added) are okay. Do not drink other fluids, diet soda or chew gum.              Who to contact     If you have questions or need follow up information about today's clinic visit or your schedule please contact [facility] directly at [phone].  Normal or non-critical lab and imaging results will be communicated to you by MyChart, letter or phone within 4 business days after the clinic has received the results. If you do not hear from us within 7 days, please contact the clinic through MyChart or phone. If you have a critical or abnormal lab result, we will notify you by phone as soon as possible.  Submit refill  requests through "[employer] or call your pharmacy and they will forward the refill request to us. Please allow 3 business days for your refill to be completed.          Additional Information About Your Visit        Guide Financial[employer] Information     "[employer] gives you secure access to your electronic health record. If you see a primary care provider, you can also send messages to your care team and make appointments. If you have questions, please call your primary care clinic.  If you do not have a primary care provider, please call [phone] and they will assist you.        Care EveryWhere ID     This is your Care EveryWhere ID. This could be used by other organizations to access your [place] medical records  [id number]        Your Vitals Were     Last Period                   01/18/2013            Blood Pressure from Last 3 Encounters:   03/06/18 138/90   02/27/18 135/90   02/21/18 128/78    Weight from Last 3 Encounters:   03/06/18 102.1 kg (225 lb)   02/27/18 99.8 kg (220 lb)   02/21/18 100 kg (220 lb 8 oz)              Today, you had the following     No orders found for display       Primary Care Provider Office Phone # Fax #    Heidy Jama [phone] [address] [place] [address]        Equal Access to Services     [facility]: Hadii holland ku hadasho Soomaali, waaxda luqadaha, qaybta kaalmada adeegyada, eulalia blunt. So [facility] [phone].    ATENCIÓN: Si habla español, tiene a stallworth disposición servicios gratuitos de asistencia lingüística. Llame al [phone].    We comply with applicable federal civil rights laws and [place] laws. We do not discriminate on the basis of race, color, national origin, age, disability, sex, sexual orientation, or gender identity.            Thank you!     [facility]. Our goal is always to provide you with excellent care. Hearing back from our patients is one way  we can continue to improve our services. Please take a few minutes to complete the written survey that you may receive in the mail after your visit with us. Thank you!             Your Updated Medication List - Protect others around you: Learn how to safely use, store and throw away your medicines at www.disposemymeds.org.          This list is accurate as of 3/13/18  2:01 PM.  Always use your most recent med list.                   Brand Name Dispense Instructions for use Diagnosis    albiglutide 50 MG pen    TANZEUM    12 each    Inject 50 mg Subcutaneous once a week    Uncontrolled type 2 diabetes mellitus without complication, with long-term current use of insulin (H)       albuterol 108 (90 BASE) MCG/ACT Inhaler    VENTOLIN HFA    1 Inhaler    Inhale 1-2 puffs into the lungs every 4 hours as needed    Routine general medical examination at a health care facility       atorvastatin 10 MG tablet    LIPITOR    30 tablet    Take 1 tablet (10 mg) by mouth daily    Uncontrolled type 2 diabetes mellitus without complication, with long-term current use of insulin (H), Hyperlipidemia LDL goal <100       BASAGLAR 100 UNIT/ML injection     60 mL    50 units once daily.    Uncontrolled type 2 diabetes mellitus without complication, with long-term current use of insulin (H)       beclomethasone 40 MCG/ACT Inhaler    QVAR    1 Inhaler    Inhale 2 puffs into the lungs 2 times daily    Mild persistent asthma without complication       * blood glucose monitoring meter device kit    no brand specified    1 kit    Use to test blood sugar 2 times daily or as directed. One touch or as covered by insurance.    Type 2 diabetes mellitus with stage 1 chronic kidney disease, without long-term current use of insulin (H)       * blood glucose monitoring meter device kit     1 kit    Check blood sugar two times daily or as directed    Uncontrolled type 2 diabetes mellitus without complication, with long-term current use of insulin (H)        blood glucose monitoring test strip    no brand specified    200 strip    Use to test blood sugars 2 times daily or as directed. One touch or as covered by insurance    Type 2 diabetes mellitus with stage 1 chronic kidney disease, without long-term current use of insulin (H)       HumaLOG KWIKpen 100 UNIT/ML injection   Generic drug:  insulin lispro     15 mL    Inject 10 Units Subcutaneous 3 times daily (before meals)    Uncontrolled type 2 diabetes mellitus without complication, with long-term current use of insulin (H)       hydrochlorothiazide 25 MG tablet    HYDRODIURIL    30 tablet    Take 1 tablet (25 mg) by mouth every morning    Essential hypertension, benign, Uncontrolled type 2 diabetes mellitus without complication, with long-term current use of insulin (H)       insulin degludec 200 UNIT/ML pen    TRESIBA    18 mL    Inject 50 Units Subcutaneous daily    Uncontrolled type 2 diabetes mellitus without complication, with long-term current use of insulin (H)       losartan 100 MG tablet    COZAAR    30 tablet    Take 1 tablet (100 mg) by mouth daily    Uncontrolled type 2 diabetes mellitus without complication, with long-term current use of insulin (H), Essential hypertension, benign       metFORMIN 500 MG 24 hr tablet    GLUCOPHAGE-XR    120 tablet    Take 4 tablets (2,000 mg) by mouth daily (with dinner)    Uncontrolled type 2 diabetes mellitus without complication, with long-term current use of insulin (H)       sertraline 50 MG tablet    ZOLOFT    30 tablet    Take 1 tablet (50 mg) by mouth daily    Adjustment disorder with mixed anxiety and depressed mood       ULTICARE MINI 31G X 6 MM   Generic drug:  insulin pen needle     600 each    Use 10 pen needles daily or as directed.    Type 2 diabetes mellitus, uncontrolled (H)       * Notice:  This list has 2 medication(s) that are the same as other medications prescribed for you. Read the directions carefully, and ask your doctor or other care provider  to review them with you.

## 2018-03-13 NOTE — PROCEDURES
"  [facility] Report  ===========================    Patient Information:  --------------------  Lala George  Patient ID:  [id number]   :  1970  Recording date:  3/12/2018       Indication of the sleep study: Lala George is a 47 year old female with history of hypertension, DM type II,  hyperlipidemia, anxiety and asthma who was seen at the [facility] in [place] with complains of loud snoring, waking up gasping air, wake up coughing but not choking, not refreshing/resting sleep, difficulty staying asleep, excessive daytime sleepiness affecting her tasks at work and tiredness for several years. She has frequent awakening at night which cause difficulty staying asleep at night. She has morning headache and dry mouth and throat in the morning. Ht 1.651 m (5' 5\")  Wt 99.8 kg (220 lb)  LMP 2013  SpO2 95%  BMI 36.61 kg/m2.    Recording Information:  ----------------------  This was a Type 3 unattended sleep study (measuring flow, effort, heart rate and pulse oximetry) performed at home. Please refer to EPIC procedure for detailed scoring report.   This study was considered adequate based on > 4 hours of quality oximetry and respiratory recording. As specified by the AASM Manual for the Scoring of Sleep and Associated events, version 2.3, Rule VIII.D 1B, 4% oxygen desaturation scoring for hypopneas is used as a standard of care on all home sleep apnea testing.  Recording date:  3/12/2018   Recording duration:  577.0 minutes  Time in bed:  423.9 minutes  Estimated sleep efficiency was 79.2 %.   Time spent in supine position:  77.8 % of total bed time  The test quality was: adequate for interpretation  The test duration was: adequate for interpretation  Respiratory Analysis:  ---------------------  AHI: 67.8 /hour  AHI (supine):  75.7 /hour  AHI (non-supine):  7.4 /hour  SARANYA: 55.6 /hour (Number of oxygen desaturations per hour)  Snore index: 5.9 (percentage of time spent " snoring versus the total time spent in bed)  Central apnea index:  6.2 / hour  Mixed and central apnea index 10.9 / hour.    The sleep study demonstrated severe sleep disordered breathing which was characterized predominantly by obstructive apneas and hypopneas. The sleep-disordered breathing was predominantly in supine body position.     Oximetry Analysis:  ------------------  Baseline oxygen saturation during sleep was normal.   Lowest oxygen saturation:  57.0 %  Majority of the sleep time spent with oxygen saturation greater than 90%.   22.1% of the total recording time was spent with oxygen saturation less than 90%.   Time Spent oxygen saturation below 89% was 77.1 minutes.    Cardiac Analysis:  -----------------  Maximum pulse rate was 103.0 /minute and minimal pulse rate was 54.0/minute. Time spent above 100 bpm was 0.6 minutes and time spent below 40 bpm was 0.0 minutes.    Diagnosis:  ==========  Severe obstructive sleep apnea G47.33  Sleep related hypoxemia G47.34    Recommendations:   ================    1. Based on the presence of the obstructive sleep apnea, treatment could be empirically initiated with Auto-titrating PAP therapy with a narrow range of 8-12 cmH2O and consider changing to fixed CPAP pressure in next clinic visit. Recommend clinical follow up with sleep management team. Recommend clinical follow up with sleep management team after using PAP for 4-6 weeks for coaching and effectiveness and measures. There were mixed and central apneas related to severity of obstructive sleep apnea, but if PAP emergent central apnea persists during CPAP therapy, would consider in-laboratory all night CPAP titration.  2. Sleep related hypoxemia may very well resolve once obstruction/apnea caused by sleep disordered is addressed but if it persists on overnight oximetry could consider supplemental O2 therapy.    3. Fit nasal mask with chin strap or as per patient s preference.  4. Recommend optimizing  wake-sleep schedule and avoiding sleep deprivation.    Other Recommendations:  ======================  1. Start weight loss program if BMI > 25.    2. Avoid sedating medications, including narcotics and alcohol, as these may exacerbate sleep apnea and/or if underlying respiratory disorders.     3. Positional therapy by avoiding sleep in supine position.    4. Avoid driving when drowsy    5. If unable to follow in sleep clinic, then patient should follow with referring physician/primary care doctor.        Electronically signed by:        Mir De Jesus MD  Sleep Medicine Physician  CONNIE Diplomate, Sleep Medicine and Internal Medicine  [facility].

## 2018-03-13 NOTE — PROGRESS NOTES
Diabetes Self Management Training: Professional Continuous Glucose Monitor Insertion    SUBJECTIVE:   Lala George is accompanied by self    Patient concerns: reports sensor came off this morning, wondering if she could have it replaced    OBJECTIVE/ ASSESSMENT:    LibrePro sensor started today. (Lot # [id number], Serial # [id number], Expiration date 2018-03-31) Sensor was inserted with no resistance or bleeding at insertion site.  Pt will plan to wear the sensor for up to 14 days.     Patient verbalizes understanding of how to remove sensor if needed, and all instructions provided.     Follow-up:    CDE appointment scheduled for 3/19/18.    Bessie Loomis RD, CDE  Diabetes     Time Spent: 5 minutes  No charge visit  Encounter Type: Individual

## 2018-03-13 NOTE — PROGRESS NOTES
Lala,  Your c-peptide (a measure of your body's insulin production) was a little low compared to a glucose of 221 but you are make some insulin on your own.  We'll discuss this in more detail at your next appointment.  Here's a copy of the results for your records.  Idalia Boudreaux NP  Endocrinology

## 2018-03-13 NOTE — NURSING NOTE
HST drop off  Download successful. Routed for scoring.    Sleep time: 10:30pm  Awake time: 6:30am    Bailee ReisBeavers  Sleep Clinic - Specialist

## 2018-03-14 ENCOUNTER — OFFICE VISIT (OUTPATIENT)
Dept: OPTOMETRY | Facility: CLINIC | Age: 48
End: 2018-03-14
Payer: COMMERCIAL

## 2018-03-14 DIAGNOSIS — H52.4 PRESBYOPIA: ICD-10-CM

## 2018-03-14 DIAGNOSIS — E11.9 DIABETES MELLITUS WITHOUT OPHTHALMIC MANIFESTATIONS (H): Primary | ICD-10-CM

## 2018-03-14 DIAGNOSIS — H52.13 MYOPIA OF BOTH EYES: ICD-10-CM

## 2018-03-14 PROCEDURE — 92004 COMPRE OPH EXAM NEW PT 1/>: CPT | Performed by: OPTOMETRIST

## 2018-03-14 PROCEDURE — 92015 DETERMINE REFRACTIVE STATE: CPT | Performed by: OPTOMETRIST

## 2018-03-14 PROCEDURE — 92310 CONTACT LENS FITTING OU: CPT | Performed by: OPTOMETRIST

## 2018-03-14 ASSESSMENT — REFRACTION_MANIFEST
OS_ADD: +2.25
OS_SPHERE: -5.75
OD_CYLINDER: +0.75
OD_AXIS: 023
OS_CYLINDER: SPH
OD_AXIS: 020
OD_CYLINDER: +0.75
OS_SPHERE: -5.25
METHOD_AUTOREFRACTION: 1
OD_SPHERE: -4.50
OD_SPHERE: -5.25
OD_ADD: +2.25

## 2018-03-14 ASSESSMENT — VISUAL ACUITY
OD_CC+: -2
OS_CC+: -3
OD_SC: 20/200
OD_CC: 20/25
CORRECTION_TYPE: CONTACTS
OD_SC: 20/600
OS_CC: 20/25
METHOD: SNELLEN - LINEAR
OS_SC: 20/600
OS_SC: 20/80

## 2018-03-14 ASSESSMENT — REFRACTION_CURRENTRX
OD_SPHERE: -4.50
OS_BRAND: ALCON AIR OPTIX AQUA BC 8.6, D 14.2
OS_SPHERE: -5.25
OD_BRAND: ALCON AIR OPTIX AQUA BC 8.6, D 14.2

## 2018-03-14 ASSESSMENT — REFRACTION_WEARINGRX
OS_SPHERE: -4.00
SPECS_TYPE: SVL
OD_SPHERE: -4.00

## 2018-03-14 ASSESSMENT — KERATOMETRY
OD_AXISANGLE2_DEGREES: 125
OD_K2POWER_DIOPTERS: 44.75
OS_K1POWER_DIOPTERS: 43.75
METHOD_AUTO_MANUAL: AUTOMATED
OD_K1POWER_DIOPTERS: 44.37
OD_AXISANGLE_DEGREES: 35
OS_AXISANGLE_DEGREES: 82
OS_K2POWER_DIOPTERS: 44.25
OS_AXISANGLE2_DEGREES: 172

## 2018-03-14 ASSESSMENT — TONOMETRY
OD_IOP_MMHG: 17
OS_IOP_MMHG: 17
IOP_METHOD: APPLANATION

## 2018-03-14 ASSESSMENT — CUP TO DISC RATIO
OD_RATIO: 0.5
OS_RATIO: 0.6

## 2018-03-14 ASSESSMENT — SLIT LAMP EXAM - LIDS
COMMENTS: NORMAL
COMMENTS: NORMAL

## 2018-03-14 ASSESSMENT — EXTERNAL EXAM - RIGHT EYE: OD_EXAM: NORMAL

## 2018-03-14 ASSESSMENT — CONF VISUAL FIELD
OD_NORMAL: 1
OS_NORMAL: 1

## 2018-03-14 ASSESSMENT — EXTERNAL EXAM - LEFT EYE: OS_EXAM: NORMAL

## 2018-03-14 NOTE — PROGRESS NOTES
Chief Complaint   Patient presents with     Diabetic Eye Exam     Diabetic     Contact Lens Evaluation     Lab Results   Component Value Date    A1C 10.1 01/24/2018    A1C 13.0 04/10/2017    A1C 13.3 01/04/2017    A1C 14.6 05/18/2016    A1C 8.1 04/01/2015   Last eye exam was fit with monthly lenses -4.25 OU     Previous contact lens wearer? Yes: monthly  Comfort of contact lenses :Good  Satisfied with current lenses: Yes        Last Eye Exam: 2yrs 6/16 [facility]  Dilated Previously: Yes    What are you currently using to see?  contacts    Distance Vision Acuity: Satisfied with vision    Near Vision Acuity: Satisfied with vision while reading  with readers    Eye Comfort: good  Do you use eye drops? : No  Occupation or Hobbies: Everyday  Works overnights at [employer]     Medical, surgical and family histories reviewed and updated 3/14/2018.       OBJECTIVE: See Ophthalmology exam    ASSESSMENT:    ICD-10-CM    1. 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 Myopia of both eyes H52.13 REFRACTION     CONTACT LENS FITTING,BILAT   3. Presbyopia H52.4       PLAN:   Updated contacts and glasses prescription dispensed trials with   Clear care and revitalens       Marianna Magallanes OD

## 2018-03-14 NOTE — LETTER
3/14/2018         RE: Lala George  [address]  [place] [address]        Dear Colleague,    [facility] Please see a copy of my visit note below.    Chief Complaint   Patient presents with     Diabetic Eye Exam     Diabetic     Contact Lens Evaluation     Lab Results   Component Value Date    A1C 10.1 01/24/2018    A1C 13.0 04/10/2017    A1C 13.3 01/04/2017    A1C 14.6 05/18/2016    A1C 8.1 04/01/2015   Last eye exam was fit with monthly lenses -4.25 OU     Previous contact lens wearer? Yes: monthly  Comfort of contact lenses :Good  Satisfied with current lenses: Yes        Last Eye Exam: 2yrs 6/16 [facility]  Dilated Previously: Yes    What are you currently using to see?  contacts    Distance Vision Acuity: Satisfied with vision    Near Vision Acuity: Satisfied with vision while reading  with readers    Eye Comfort: good  Do you use eye drops? : No  Occupation or Hobbies: Everyday  Works overnights at [employer]     Medical, surgical and family histories reviewed and updated 3/14/2018.       OBJECTIVE: See Ophthalmology exam    ASSESSMENT:    ICD-10-CM    1. 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 Myopia of both eyes H52.13 REFRACTION     CONTACT LENS FITTING,BILAT   3. Presbyopia H52.4       PLAN:   Updated contacts and glasses prescription dispensed trials with   Clear care and revitalens       Marianna Magallanes OD                     Again, thank you for allowing me to participate in the care of your patient.        Sincerely,        Marianna Magallanes, OD

## 2018-03-14 NOTE — MR AVS SNAPSHOT
"              After Visit Summary   3/14/2018    Lala George    MRN: [id number]           Patient Information     Date Of Birth          1970        Visit Information        Provider Department      3/14/2018 4:40 PM Marianna Magallanes, [facility]        Today's Diagnoses     Diabetes mellitus without ophthalmic manifestations (H)    -  1    Myopia of both eyes        Presbyopia          Care Instructions    Updated glasses and contact lens prescription   Once your contact lens prescription is finalized and you are not having any problems with the trials or current lenses you can order your supply of lenses.   You can order your contact lenses online at [email or web address].  Click on services at the top of the page, then eye care and you will see the link to order contact lenses.  You can also order contact lenses at [phone]. There is no shipping fee if you order an annual supply otherwise  be sure to let them know which office you would like to  the lenses, [facility] [phone].    Could consider wearing 2 R contact lenses when you want better near vision   (Currently under corrected for distance )    +2.25 readers for 16\"   +1.50 for computer   Continue with clearcare, and rewetting drops if needed      Blood glucose should be below 120 in order to prevent ocular complications of diabetes. Each 1% decrease in your A1c significantly reduces your progression of diabetic retinopathy. Controlling diseases such as cholesterol and  blood pressure will further decrease your chance of diabetic eye disease loss.       What Is Diabetic Retinopathy?  Diabetic retinopathy is the leading cause of blindness in adults. It happens when diabetes harms blood vessels inside the eye. These weak vessels leak fluid into an area of the eye called the retina. Weak new vessels can break and bleed into the retina. Old vessels can leak and cause swelling. These vessels can damage areas of the retina, " causing blurry, distorted vision.    What causes diabetic retinopathy?  Diabetes is the cause of this eye disease. Over time, diabetes makes blood vessels weaken all over the body, including in the eyes. Poor blood sugar control can make retinopathy worse. So can smoking, high cholesterol, or poorly controlled high blood pressure. Pregnancy can also cause retinopathy to worsen. Diabetic retinopathy happens more often in Hispanics and in  Americans.   What are the symptoms?  You can have diabetic retinopathy without knowing it. Usually, there is no pain and no outward sign. Over time, you may notice gradual blurring or some vision loss. Some people have trouble seeing at night. or see spots or floaters. Symptoms may come and go. Early treatment and good control of risk factors may help prevent vision loss or blindness.  What you can do  Have your eyes examined regularly by an eye specialist. Your healthcare provider will tell you when and how often you need these exams. You can also help control your diabetes through exercise, diet, and medicine, as instructed by your healthcare provider. These same steps may also help control diabetic retinopathy.  Date Last Reviewed: 6/1/2016 2000-2017 [employer]. [address] [place] [address]. All rights reserved. This information is not intended as a substitute for professional medical care. Always follow your healthcare professional's instructions.    You had a normal retina exam today, no retinopathy                         Follow-ups after your visit        Follow-up notes from your care team     Return in about 1 year (around 3/14/2019).      Your next 10 appointments already scheduled     Mar 16, 2018  2:45 PM CDT   (Arrive by 2:30 PM)   MA SCREENING DIGITAL BILATERAL with EAMA1   [facility] ([facility])    [address] ,[address]  [place] [address] [phone]           Do not use any powder,  "lotion or deodorant under your arms or on your breast. If you do, we will ask you to remove it before your exam.  Wear comfortable, two-piece clothing.  If you have any allergies, tell your care team.  Bring any previous mammograms from other facilities or have them mailed to the breast center. Three-dimensional (3D) mammograms are available at [place] locations in [place], [place], [place], [place], [facility], [place], [place], and [place]. Health locations include [place] and [facility] in [place]. Benefits of 3D mammograms include: - Improved rate of cancer detection - Decreases your chance of having to go back for more tests, which means fewer: - \"False-positive\" results (This means that there is an abnormal area but it isn't cancer.) - Invasive testing procedures, such as a biopsy or surgery - Can provide clearer images of the breast if you have dense breast tissue. 3D mammography is an optional exam that anyone can have with a 2D mammogram. It doesn't replace or take the place of a 2D mammogram. 2D mammograms remain an effective screening test for all women.  Not all insurance companies cover the cost of a 3D mammogram. Check with your insurance.            Mar 19, 2018  1:30 PM CDT   Diabetic Education with Bessie Loomis   [facility] ([facility])    [address] [address] [phone]            Mar 21, 2018  5:00 PM CDT   Return Sleep Patient with iMr De Jesus MD   [facility] ([facility] [place])    [address] [place] [address]  [facility] [id number] [phone]            Apr 23, 2018  2:00 PM CDT   Return Visit with EDMOND Velez CNP   [facility] ([facility])    [address] [facility] [address] [phone]            Apr 23, 2018  2:00 PM CDT   LAB with CR LAB   [facility] " [place] ([facility])    [address] [address] [phone]           Please do not eat 10-12 hours before your appointment if you are coming in fasting for labs on lipids, cholesterol, or glucose (sugar). This does not apply to pregnant women. Water, hot tea and black coffee (with nothing added) are okay. Do not drink other fluids, diet soda or chew gum.              Who to contact     If you have questions or need follow up information about today's clinic visit or your schedule please contact [facility] [place] directly at [phone].  Normal or non-critical lab and imaging results will be communicated to you by MyChart, letter or phone within 4 business days after the clinic has received the results. If you do not hear from us within 7 days, please contact the clinic through [employer] or phone. If you have a critical or abnormal lab result, we will notify you by phone as soon as possible.  Submit refill requests through [employer] or call your pharmacy and they will forward the refill request to us. Please allow 3 business days for your refill to be completed.          Additional Information About Your Visit        [employer] Information     [employer] gives you secure access to your electronic health record. If you see a primary care provider, you can also send messages to your care team and make appointments. If you have questions, please call your primary care clinic.  If you do not have a primary care provider, please call [phone] and they will assist you.        Care EveryWhere ID     This is your Care EveryWhere ID. This could be used by other organizations to access your [facility] medical records  [id number]        Your Vitals Were     Last Period                   01/18/2013            Blood Pressure from Last 3 Encounters:   03/06/18 138/90   02/27/18 135/90   02/21/18 128/78    Weight from Last 3 Encounters:   03/06/18 102.1 kg (225 lb)    02/27/18 99.8 kg (220 lb)   02/21/18 100 kg (220 lb 8 oz)              We Performed the Following     CONTACT LENS FITTING,BILAT     EYE EXAM (SIMPLE-NONBILLABLE)     REFRACTION        Primary Care Provider Office Phone # Fax #    Heidy Jama [phone] [address] [place] [address]        Equal Access to Services     [facility]: Hadii aad ku hadasho Soomaali, waaxda luqadaha, qaybta kaalmada adeegyada, waxay idiin hayaan adeeg kharash la'aan ah. So [facility] [phone].    ATENCIÓN: Si habla espadriana, tiene a stallworth disposición servicios gratuitos de asistencia lingüística. [facility] [phone].    We comply with applicable federal civil rights laws and [place] laws. We do not discriminate on the basis of race, color, national origin, age, disability, sex, sexual orientation, or gender identity.            Thank you!     [facility] you for choosing [facility]  for your care. Our goal is always to provide you with excellent care. Hearing back from our patients is one way we can continue to improve our services. Please take a few minutes to complete the written survey that you may receive in the mail after your visit with us. Thank you!             Your Updated Medication List - Protect others around you: Learn how to safely use, store and throw away your medicines at www.disposemymeds.org.          This list is accurate as of 3/14/18  5:48 PM.  Always use your most recent med list.                   Brand Name Dispense Instructions for use Diagnosis    albiglutide 50 MG pen    TANZEUM    12 each    Inject 50 mg Subcutaneous once a week    Uncontrolled type 2 diabetes mellitus without complication, with long-term current use of insulin (H)       albuterol 108 (90 BASE) MCG/ACT Inhaler    VENTOLIN HFA    1 Inhaler    Inhale 1-2 puffs into the lungs every 4 hours as needed    Routine general medical examination at a health care facility       atorvastatin 10 MG tablet     LIPITOR    30 tablet    Take 1 tablet (10 mg) by mouth daily    Uncontrolled type 2 diabetes mellitus without complication, with long-term current use of insulin (H), Hyperlipidemia LDL goal <100       BASAGLAR 100 UNIT/ML injection     60 mL    50 units once daily.    Uncontrolled type 2 diabetes mellitus without complication, with long-term current use of insulin (H)       beclomethasone 40 MCG/ACT Inhaler    QVAR    1 Inhaler    Inhale 2 puffs into the lungs 2 times daily    Mild persistent asthma without complication       * blood glucose monitoring meter device kit    no brand specified    1 kit    Use to test blood sugar 2 times daily or as directed. One touch or as covered by insurance.    Type 2 diabetes mellitus with stage 1 chronic kidney disease, without long-term current use of insulin (H)       * blood glucose monitoring meter device kit     1 kit    Check blood sugar two times daily or as directed    Uncontrolled type 2 diabetes mellitus without complication, with long-term current use of insulin (H)       blood glucose monitoring test strip    no brand specified    200 strip    Use to test blood sugars 2 times daily or as directed. One touch or as covered by insurance    Type 2 diabetes mellitus with stage 1 chronic kidney disease, without long-term current use of insulin (H)       HumaLOG KWIKpen 100 UNIT/ML injection   Generic drug:  insulin lispro     15 mL    Inject 10 Units Subcutaneous 3 times daily (before meals)    Uncontrolled type 2 diabetes mellitus without complication, with long-term current use of insulin (H)       hydrochlorothiazide 25 MG tablet    HYDRODIURIL    30 tablet    Take 1 tablet (25 mg) by mouth every morning    Essential hypertension, benign, Uncontrolled type 2 diabetes mellitus without complication, with long-term current use of insulin (H)       insulin degludec 200 UNIT/ML pen    TRESIBA    18 mL    Inject 50 Units Subcutaneous daily    Uncontrolled type 2 diabetes  mellitus without complication, with long-term current use of insulin (H)       losartan 100 MG tablet    COZAAR    30 tablet    Take 1 tablet (100 mg) by mouth daily    Uncontrolled type 2 diabetes mellitus without complication, with long-term current use of insulin (H), Essential hypertension, benign       metFORMIN 500 MG 24 hr tablet    GLUCOPHAGE-XR    120 tablet    Take 4 tablets (2,000 mg) by mouth daily (with dinner)    Uncontrolled type 2 diabetes mellitus without complication, with long-term current use of insulin (H)       sertraline 50 MG tablet    ZOLOFT    30 tablet    Take 1 tablet (50 mg) by mouth daily    Adjustment disorder with mixed anxiety and depressed mood       ULTICARE MINI 31G X 6 MM   Generic drug:  insulin pen needle     600 each    Use 10 pen needles daily or as directed.    Type 2 diabetes mellitus, uncontrolled (H)       * Notice:  This list has 2 medication(s) that are the same as other medications prescribed for you. Read the directions carefully, and ask your doctor or other care provider to review them with you.

## 2018-03-14 NOTE — PATIENT INSTRUCTIONS
"Updated glasses and contact lens prescription   Once your contact lens prescription is finalized and you are not having any problems with the trials or current lenses you can order your supply of lenses.   You can order your contact lenses online at [email or web address].  Click on services at the top of the page, then eye care and you will see the link to order contact lenses.  You can also order contact lenses at [phone]. There is no shipping fee if you order an annual supply otherwise  be sure to let them know which office you would like to  the lenses, Kend[place] [phone].    Could consider wearing 2 R contact lenses when you want better near vision   (Currently under corrected for distance )    +2.25 readers for 16\"   +1.50 for computer   Continue with clearcare, and rewetting drops if needed      Blood glucose should be below 120 in order to prevent ocular complications of diabetes. Each 1% decrease in your A1c significantly reduces your progression of diabetic retinopathy. Controlling diseases such as cholesterol and  blood pressure will further decrease your chance of diabetic eye disease loss.       What Is Diabetic Retinopathy?  Diabetic retinopathy is the leading cause of blindness in adults. It happens when diabetes harms blood vessels inside the eye. These weak vessels leak fluid into an area of the eye called the retina. Weak new vessels can break and bleed into the retina. Old vessels can leak and cause swelling. These vessels can damage areas of the retina, causing blurry, distorted vision.    What causes diabetic retinopathy?  Diabetes is the cause of this eye disease. Over time, diabetes makes blood vessels weaken all over the body, including in the eyes. Poor blood sugar control can make retinopathy worse. So can smoking, high cholesterol, or poorly controlled high blood pressure. Pregnancy can also cause retinopathy to worsen. Diabetic retinopathy happens more often in Hispanics and in "  Americans.   What are the symptoms?  You can have diabetic retinopathy without knowing it. Usually, there is no pain and no outward sign. Over time, you may notice gradual blurring or some vision loss. Some people have trouble seeing at night. or see spots or floaters. Symptoms may come and go. Early treatment and good control of risk factors may help prevent vision loss or blindness.  What you can do  Have your eyes examined regularly by an eye specialist. Your healthcare provider will tell you when and how often you need these exams. You can also help control your diabetes through exercise, diet, and medicine, as instructed by your healthcare provider. These same steps may also help control diabetic retinopathy.  Date Last Reviewed: 6/1/2016 2000-2017 [employer]. [address] [place] [address], [place] [address]. All rights reserved. This information is not intended as a substitute for professional medical care. Always follow your healthcare professional's instructions.    You had a normal retina exam today, no retinopathy

## 2018-03-16 ENCOUNTER — RADIANT APPOINTMENT (OUTPATIENT)
Dept: MAMMOGRAPHY | Facility: CLINIC | Age: 48
End: 2018-03-16
Payer: COMMERCIAL

## 2018-03-16 DIAGNOSIS — Z12.31 ENCOUNTER FOR SCREENING MAMMOGRAM FOR BREAST CANCER: ICD-10-CM

## 2018-03-16 PROCEDURE — 77067 SCR MAMMO BI INCL CAD: CPT | Mod: TC

## 2018-03-16 PROCEDURE — 77063 BREAST TOMOSYNTHESIS BI: CPT | Mod: TC

## 2018-03-19 ENCOUNTER — ALLIED HEALTH/NURSE VISIT (OUTPATIENT)
Dept: EDUCATION SERVICES | Facility: CLINIC | Age: 48
End: 2018-03-19
Payer: COMMERCIAL

## 2018-03-19 PROCEDURE — G0108 DIAB MANAGE TRN  PER INDIV: HCPCS | Performed by: DIETITIAN, REGISTERED

## 2018-03-19 NOTE — PATIENT INSTRUCTIONS
My Diabetes Care Goals:    Check blood sugar before each meal, take 10 units Humalog, eat meal, can test 2 hrs after the meal if able.     Take 50 units Basaglar each day (switch to Tresiba when Basaglar used up).     For the next week-  Record food and insulin doses (and blood sugars).     Continue to work on consistency with taking your insulin. If unable to check a blood sugar- you should still take your Humalog before each meal that contains carbohydrate.    Remove sensor on Tues, Mar. 27th and bring with you to next appointment.     Will check with Idalia about ordering a personal Sandi- will send prescription to [facility] in [place].     Bessie Loomis RD, LD, CDE  Diabetes        Bring blood glucose meter and logbook with you to all doctor and follow-up appointments.     [facility] for the [facility]:  For Your Diabetes Education and Nutrition Appointments Call:  [phone]   For Diabetes Education or Nutrition Related Questions:   Phone: [phone]  E-mail: [email or web address]  Fax: [phone]   If you need a medication refill please contact your pharmacy. Please allow 3 business days for your refills to be completed.    Instructions for emailing the Diabetes Educators    If you need to communicate a non-urgent message to a Diabetes Educator via email, please send to [email or web address].    Please follow the following email guidelines:    Subject line: Secure: your clinic name (example: Secure: [facility])  In the email please include: First name, middle initial, last name and date of birth.    We will be in touch with you within one (1) business day.

## 2018-03-19 NOTE — Clinical Note
Wale Friedman, Please bill for diagnostic CGM as usual.  Pt has started Humalog- but needs to work on taking consistently.  Pt agreeable to personal CGM- Will send you an Rx request for the Sandi.   Thanks! Bessie Loomis RD, CDE Diabetes

## 2018-03-19 NOTE — PROGRESS NOTES
LibrePro Continuous Glucose Monitor Interpretation   Diagnostic CGM replaced last week -  Humalog insulin start 3/14/18    Patient History:   1. Type of Diabetes: Type 2 diabetes  2. Duration of diabetes or year of diagnosis:   3. Current treatment regimen:    Diabetes Medication(s)     Biguanides Sig    metFORMIN (GLUCOPHAGE-XR) 500 MG 24 hr tablet Take 4 tablets (2,000 mg) by mouth daily (with dinner)    Insulin Sig    HUMALOG KWIKPEN 100 UNIT/ML soln Inject 10 Units Subcutaneous 3 times daily (before meals)    insulin degludec (TRESIBA) 200 UNIT/ML pen Inject 50 Units Subcutaneous daily    Incretin Mimetic Agents (GLP-1 Receptor Agonists) Sig        4. Most recent A1c values:  Lab Results   Component Value Date    A1C 10.1 2018    A1C 13.0 04/10/2017    A1C 13.3 2017     5. Indication/s for LibrePro study: Difficult to manage hypoglycemia and/or hyperglycemia, despite multiple adjustments in self-monitoring of blood glucose and diabetes medication administration and Unexplained fluctuations in glucose values.        Statistics:   1. Sensor worn for 7 days.  2. Statistics:    Percent in target is: 8%  Percent above target is: 92%  (percent above 250 mg/dl: 51%)  Percent below target is: 0%  3.  Estimated A1c: 10.8%                        Data evaluation:   Ambulatory Glucose Profile (AGP) shows the followin. Consistent day-to-day patterns noted: pattern of daytime hyperglycemia, pattern of nocturnal hyperglycemia.  2. Average glucose: 262 mg/dL.  SD (Standard Deviation): 64.6  3. Hypoglycemia: None    Patient's Logbook shows the following:  Log is incomplete- see below  Carbohydrate counting is: pt is working on being more carb aware and limiting intake of candy/sweets  Medication and/or insulin dosing is: has been inconsistent with taking Humalog- has missed several doses, is still using up Basaglar prior to switching to Tresiba    Education provided today:  AADE Self-Care  Behaviors:  Monitoring: review of CGM report and BG's  Taking Medication: action of prescribed medication, when to take medications     Opportunities for ongoing education and support in diabetes-self management were discussed.    Pt verbalized understanding of concepts discussed and recommendations provided today.       Educational and other materials:  Copy of diagnostic CGM report    Assessment:  Note some BG  improvement since start of Humalog insulin. Reinforced  importance of taking insulin as directed.  Pt would benefit from personal CGM-  pt agreeable    Plan:  1. Pt will work on insulin adherence- avoid missed doses of Humalog. No change in doses at today's visit.  2. Follow up in 2 weeks, remove diagnostic CGM on 3/27/18 and bring to next visit.  3. Will route Rx request to Idalia Boudreaux NP for personal Sandi CGM. Bring to next appt for instruction.  4. Increase BG monitoring (before/ after meals). Keep a food/ insulin record for next visit.     Bessie Loomis RD, CDE  Diabetes     Time Spent: 30 minutes  Any diabetes medication dose changes were made via the CDE Protocol and Collaborative Practice Agreement with the patient's endocrinology provider. A copy of this encounter was shared with the provider.

## 2018-03-19 NOTE — LETTER
3/19/2018         RE: Lala George  [address]  [place] [address]        Dear Colleague,    Thank you for referring your patient, Lala George, to the [facility]. Please see a copy of my visit note below.    LibrePro Continuous Glucose Monitor Interpretation   Diagnostic CGM replaced last week -  Humalog insulin start 3/14/18    Patient History:   1. Type of Diabetes: Type 2 diabetes  2. Duration of diabetes or year of diagnosis:   3. Current treatment regimen:    Diabetes Medication(s)     Biguanides Sig    metFORMIN (GLUCOPHAGE-XR) 500 MG 24 hr tablet Take 4 tablets (2,000 mg) by mouth daily (with dinner)    Insulin Sig    HUMALOG KWIKPEN 100 UNIT/ML soln Inject 10 Units Subcutaneous 3 times daily (before meals)    insulin degludec (TRESIBA) 200 UNIT/ML pen Inject 50 Units Subcutaneous daily    Incretin Mimetic Agents (GLP-1 Receptor Agonists) Sig        4. Most recent A1c values:  Lab Results   Component Value Date    A1C 10.1 2018    A1C 13.0 04/10/2017    A1C 13.3 2017     5. Indication/s for LibrePro study: Difficult to manage hypoglycemia and/or hyperglycemia, despite multiple adjustments in self-monitoring of blood glucose and diabetes medication administration and Unexplained fluctuations in glucose values.        Statistics:   1. Sensor worn for 7 days.  2. Statistics:    Percent in target is: 8%  Percent above target is: 92%  (percent above 250 mg/dl: 51%)  Percent below target is: 0%  3.  Estimated A1c: 10.8%                        Data evaluation:   Ambulatory Glucose Profile (AGP) shows the followin. Consistent day-to-day patterns noted: pattern of daytime hyperglycemia, pattern of nocturnal hyperglycemia.  2. Average glucose: 262 mg/dL.  SD (Standard Deviation): 64.6  3. Hypoglycemia: None    Patient's Logbook shows the following:  Log is incomplete- see below  Carbohydrate counting is: pt is working on being more carb  aware and limiting intake of candy/sweets  Medication and/or insulin dosing is: has been inconsistent with taking Humalog- has missed several doses, is still using up Basaglar prior to switching to Tresiba    Education provided today:  [facility] Self-Care Behaviors:  Monitoring: review of CGM report and BG's  Taking Medication: action of prescribed medication, when to take medications     Opportunities for ongoing education and support in diabetes-self management were discussed.    Pt verbalized understanding of concepts discussed and recommendations provided today.       Educational and other materials:  Copy of diagnostic CGM report    Assessment:  Note some BG  improvement since start of Humalog insulin. Reinforced  importance of taking insulin as directed.  Pt would benefit from personal CGM-  pt agreeable    Plan:  1. Pt will work on insulin adherence- avoid missed doses of Humalog. No change in doses at today's visit.  2. Follow up in 2 weeks, remove diagnostic CGM on 3/27/18 and bring to next visit.  3. Will route Rx request to Idalia Boudreaux NP for personal Sandi CGM. Bring to next appt for instruction.  4. Increase BG monitoring (before/ after meals). Keep a food/ insulin record for next visit.     Bessie Loomis RD, CDE  Diabetes     Time Spent: 30 minutes  Any diabetes medication dose changes were made via the CDE Protocol and Collaborative Practice Agreement with the patient's endocrinology provider. A copy of this encounter was shared with the provider.

## 2018-03-19 NOTE — MR AVS SNAPSHOT
After Visit Summary   3/19/2018    Lala George    MRN: [id number]           Patient Information     Date Of Birth          1970        Visit Information        Provider Department      3/19/2018 1:30 PM Bessie Loomis [facility] [place]        Care Instructions    My Diabetes Care Goals:    Check blood sugar before each meal, take 10 units Humalog, eat meal, can test 2 hrs after the meal if able.     Take 50 units Basaglar each day (switch to Tresiba when Basaglar used up).     For the next week-  Record food and insulin doses (and blood sugars).     Continue to work on consistency with taking your insulin. If unable to check a blood sugar- you should still take your Humalog before each meal that contains carbohydrate.    Remove sensor on Tues, Mar. 27th and bring with you to next appointment.     Will check with Idalia about ordering a personal Sandi- will send prescription to [facility] in [place].     Bessie Loomis RD, LD, CDE  Diabetes        Bring blood glucose meter and logbook with you to all doctor and follow-up appointments.     [facility] for the [facility]:  For Your Diabetes Education and Nutrition Appointments Call:  [phone]   For Diabetes Education or Nutrition Related Questions:   Phone: [phone]  E-mail: [email or web address]  Fax: [phone]   If you need a medication refill please contact your pharmacy. Please allow 3 business days for your refills to be completed.    Instructions for emailing the Diabetes Educators    If you need to communicate a non-urgent message to a Diabetes Educator via email, please send to [email or web address].    Please follow the following email guidelines:    Subject line: Secure: your clinic name (example: Secure: [facility])  In the email please include: First name, middle initial, last name and date of birth.    We will be in touch with you within one (1)  business day.             Follow-ups after your visit        Your next 10 appointments already scheduled     Mar 20, 2018  4:30 PM CDT   Return Sleep Patient with Mir De Jesus MD   [facility] ([facility])    [address]  [place] [address]   [phone]            Apr 03, 2018  2:30 PM CDT   Diabetic Education with [facility] DIAB[facility] ED RESOURCE   [facility] ([facility])    [address]  [address]  [place] [id number] [phone]            Apr 23, 2018  2:00 PM CDT   Return Visit with EDMOND Velez CNP   [facility] ([facility])    [address] [place] [address] [phone]            Apr 23, 2018  2:00 PM CDT   LAB with CR LAB   [facility] ([facility])    [address] [place] [address] [phone]           Please do not eat 10-12 hours before your appointment if you are coming in fasting for labs on lipids, cholesterol, or glucose (sugar). This does not apply to pregnant women. Water, hot tea and black coffee (with nothing added) are okay. Do not drink other fluids, diet soda or chew gum.              Who to contact     If you have questions or need follow up information about today's clinic visit or your schedule please contact [facility] directly at [phone].  Normal or non-critical lab and imaging results will be communicated to you by MyChart, letter or phone within 4 business days after the clinic has received the results. If you do not hear from us within 7 days, please contact the clinic through [employer] or phone. If you have a critical or abnormal lab result, we will notify you by phone as soon as possible.  Submit refill requests through [employer] or call your pharmacy and they will forward the refill request to us.  Please allow 3 business days for your refill to be completed.          Additional Information About Your Visit        MyChart Information     [employer] gives you secure access to your electronic health record. If you see a primary care provider, you can also send messages to your care team and make appointments. If you have questions, please call your primary care clinic.  If you do not have a primary care provider, please call [phone] and they will assist you.        Care EveryWhere ID     This is your Care EveryWhere ID. This could be used by other organizations to access your [facility] medical records  [id number]        Your Vitals Were     Last Period                   01/18/2013            Blood Pressure from Last 3 Encounters:   03/06/18 138/90   02/27/18 135/90   02/21/18 128/78    Weight from Last 3 Encounters:   03/06/18 102.1 kg (225 lb)   02/27/18 99.8 kg (220 lb)   02/21/18 100 kg (220 lb 8 oz)              Today, you had the following     No orders found for display       Primary Care Provider Office Phone # Fax #    Heidy Jama [phone] [address] [place] [address]        Equal Access to Services     [facility] AH: Hadii aad ku hadasho Soomaali, waaxda luqadaha, qaybta kaalmada adeegyada, eulalia black ah. So [facility] [phone].    ATENCIÓN: Si habla español, tiene a stallworth disposición servicios gratuitos de asistencia lingüística. Llame al [phone].    We comply with applicable federal civil rights laws and [place] laws. We do not discriminate on the basis of race, color, national origin, age, disability, sex, sexual orientation, or gender identity.            Thank you!     Thank you for choosing [facility]  for your care. Our goal is always to provide you with excellent care. Hearing back from our patients is one way we can continue to improve our services. Please take a few minutes to  complete the written survey that you may receive in the mail after your visit with us. Thank you!             Your Updated Medication List - Protect others around you: Learn how to safely use, store and throw away your medicines at www.disposemymeds.org.          This list is accurate as of 3/19/18  2:07 PM.  Always use your most recent med list.                   Brand Name Dispense Instructions for use Diagnosis    albiglutide 50 MG pen    TANZEUM    12 each    Inject 50 mg Subcutaneous once a week    Uncontrolled type 2 diabetes mellitus without complication, with long-term current use of insulin (H)       albuterol 108 (90 BASE) MCG/ACT Inhaler    VENTOLIN HFA    1 Inhaler    Inhale 1-2 puffs into the lungs every 4 hours as needed    Routine general medical examination at a health care facility       atorvastatin 10 MG tablet    LIPITOR    30 tablet    Take 1 tablet (10 mg) by mouth daily    Uncontrolled type 2 diabetes mellitus without complication, with long-term current use of insulin (H), Hyperlipidemia LDL goal <100       BASAGLAR 100 UNIT/ML injection     60 mL    50 units once daily.    Uncontrolled type 2 diabetes mellitus without complication, with long-term current use of insulin (H)       beclomethasone 40 MCG/ACT Inhaler    QVAR    1 Inhaler    Inhale 2 puffs into the lungs 2 times daily    Mild persistent asthma without complication       * blood glucose monitoring meter device kit    no brand specified    1 kit    Use to test blood sugar 2 times daily or as directed. One touch or as covered by insurance.    Type 2 diabetes mellitus with stage 1 chronic kidney disease, without long-term current use of insulin (H)       * blood glucose monitoring meter device kit     1 kit    Check blood sugar two times daily or as directed    Uncontrolled type 2 diabetes mellitus without complication, with long-term current use of insulin (H)       blood glucose monitoring test strip    no brand specified    200  strip    Use to test blood sugars 2 times daily or as directed. One touch or as covered by insurance    Type 2 diabetes mellitus with stage 1 chronic kidney disease, without long-term current use of insulin (H)       HumaLOG KWIKpen 100 UNIT/ML injection   Generic drug:  insulin lispro     15 mL    Inject 10 Units Subcutaneous 3 times daily (before meals)    Uncontrolled type 2 diabetes mellitus without complication, with long-term current use of insulin (H)       hydrochlorothiazide 25 MG tablet    HYDRODIURIL    30 tablet    Take 1 tablet (25 mg) by mouth every morning    Essential hypertension, benign, Uncontrolled type 2 diabetes mellitus without complication, with long-term current use of insulin (H)       insulin degludec 200 UNIT/ML pen    TRESIBA    18 mL    Inject 50 Units Subcutaneous daily    Uncontrolled type 2 diabetes mellitus without complication, with long-term current use of insulin (H)       losartan 100 MG tablet    COZAAR    30 tablet    Take 1 tablet (100 mg) by mouth daily    Uncontrolled type 2 diabetes mellitus without complication, with long-term current use of insulin (H), Essential hypertension, benign       metFORMIN 500 MG 24 hr tablet    GLUCOPHAGE-XR    120 tablet    Take 4 tablets (2,000 mg) by mouth daily (with dinner)    Uncontrolled type 2 diabetes mellitus without complication, with long-term current use of insulin (H)       sertraline 50 MG tablet    ZOLOFT    30 tablet    Take 1 tablet (50 mg) by mouth daily    Adjustment disorder with mixed anxiety and depressed mood       ULTICARE MINI 31G X 6 MM   Generic drug:  insulin pen needle     600 each    Use 10 pen needles daily or as directed.    Type 2 diabetes mellitus, uncontrolled (H)       * Notice:  This list has 2 medication(s) that are the same as other medications prescribed for you. Read the directions carefully, and ask your doctor or other care provider to review them with you.

## 2018-03-20 ENCOUNTER — TELEPHONE (OUTPATIENT)
Dept: EDUCATION SERVICES | Facility: CLINIC | Age: 48
End: 2018-03-20

## 2018-03-20 ENCOUNTER — OFFICE VISIT (OUTPATIENT)
Dept: SLEEP MEDICINE | Facility: CLINIC | Age: 48
End: 2018-03-20
Payer: COMMERCIAL

## 2018-03-20 VITALS
WEIGHT: 225 LBS | SYSTOLIC BLOOD PRESSURE: 134 MMHG | DIASTOLIC BLOOD PRESSURE: 86 MMHG | OXYGEN SATURATION: 95 % | BODY MASS INDEX: 37.49 KG/M2 | HEIGHT: 65 IN | RESPIRATION RATE: 15 BRPM | HEART RATE: 100 BPM

## 2018-03-20 DIAGNOSIS — E66.9 OBESITY (BMI 30-39.9): ICD-10-CM

## 2018-03-20 DIAGNOSIS — G47.33 OSA (OBSTRUCTIVE SLEEP APNEA): Primary | ICD-10-CM

## 2018-03-20 DIAGNOSIS — G47.34 SLEEP RELATED HYPOXIA: ICD-10-CM

## 2018-03-20 PROCEDURE — 99213 OFFICE O/P EST LOW 20 MIN: CPT | Performed by: INTERNAL MEDICINE

## 2018-03-20 NOTE — TELEPHONE ENCOUNTER
See Diabetes Education encounter 3/19/18.    Rx for personal Sandi CGM pended for provider signature if agreeable with plan.    Bessie Loomis RD, CDE  Diabetes

## 2018-03-20 NOTE — PROGRESS NOTES
Sleep Study Follow-Up Visit:    Date on this visit: 3/20/2018    ASSESSMENT / PLAN:       LUCIO (obstructive sleep apnea)  Sleep related hypoxia  Obesity (BMI 30-39.9)    Discussed with patient the recent sleep study and treatment options, we recommend narrow pressure Auto-CPAP 8-12 cmH2O due to presence of mild central apnea, and plan to fixed CPAP next clinic visit. We recommend weight loss and avoiding sleeping supine position. Patient is recommended to improve his sleep-wake schedule and good sleep hygiene. Patient was advised to use PAP therapy for at least 7-8 hours and during naps (naps are not recommended).  Instructions given. Follow up 4-6 weeks after PAP use.  Hypoxemia may very well resolve once respiratory events (obstruction/apneas) caused by sleep disordered breathing is addressed, but if it persists on overnight oximetry could consider supplemental O2 therapy.  Counseled regarding weight loss through diet modification and increased physical activity. Patient was given instuctions of weight loss and advised to follow up her PCP for further weight loss interventions.        All questions were answered.  The patient indicates understanding of the above issues and agrees with the plan set forth.    Orders Placed This Encounter   Procedures     Comprehensive DME       She will follow up with me in about 2 month(s).       BRIEF SUMMARY:    Lala George is a 47 year old female with history of hypertension, DM type II,  hyperlipidemia, anxiety and asthma comes in today for follow-up of her sleep study done on 3/128/18 at the [facility] for result of sleep study. Please see H&P for his presenting sleep symptoms for additional details.    Home sleep study: 3/12/18   AHI 67.8/hr (supine 75.7/hr)  CARRIE 6.2/hr  CARRIE/MAI10.9/HR  SARANYA 55.6/hr  Snore index 5.9%  Lowest O 2 saturation is 57%  Time spent O 2 saturation below 88% was 77.1 minutes    These findings were reviewed with the patient and copy of the  sleep study result was given.    Past medical/surgical history, family history, social history, medications and allergies were reviewed.      Problem List:  Patient Active Problem List    Diagnosis Date Noted     Hyperlipidemia LDL goal <100 04/27/2017     Priority: Medium     Uncontrolled type 2 diabetes mellitus without complication, with long-term current use of insulin (H) 04/10/2017     Priority: Medium     High triglycerides 05/18/2016     Priority: Medium     Abnormal liver enzymes 08/12/2014     Priority: Medium     Morbid obesity (H) 05/20/2013     Priority: Medium     Problem list name updated by automated process. Provider to review       Dyslipidemia 02/03/2010     Priority: Medium     Family history of breast cancer in mother 02/03/2010     Priority: Medium     Atopic dermatitis 01/01/2010     Priority: Medium     Essential hypertension 02/15/2006     Priority: Medium     Mild persistent asthma 07/13/2005     Priority: Medium             Medications:     Current Outpatient Prescriptions   Medication Sig     HUMALOG KWIKPEN 100 UNIT/ML soln Inject 10 Units Subcutaneous 3 times daily (before meals)     insulin degludec (TRESIBA) 200 UNIT/ML pen Inject 50 Units Subcutaneous daily     BASAGLAR 100 UNIT/ML injection 50 units once daily.     sertraline (ZOLOFT) 50 MG tablet Take 1 tablet (50 mg) by mouth daily     metFORMIN (GLUCOPHAGE-XR) 500 MG 24 hr tablet Take 4 tablets (2,000 mg) by mouth daily (with dinner)     atorvastatin (LIPITOR) 10 MG tablet Take 1 tablet (10 mg) by mouth daily     losartan (COZAAR) 100 MG tablet Take 1 tablet (100 mg) by mouth daily     hydrochlorothiazide (HYDRODIURIL) 25 MG tablet Take 1 tablet (25 mg) by mouth every morning     albiglutide (TANZEUM) 50 MG pen Inject 50 mg Subcutaneous once a week     blood glucose monitoring (ONE TOUCH ULTRA 2) meter device kit Check blood sugar two times daily or as directed     ULTICARE MINI 31G X 6 MM insulin pen needle Use 10 pen needles  daily or as directed.     blood glucose monitoring (NO BRAND SPECIFIED) test strip Use to test blood sugars 2 times daily or as directed. One touch or as covered by insurance     blood glucose monitoring (NO BRAND SPECIFIED) meter device kit Use to test blood sugar 2 times daily or as directed. One touch or as covered by insurance.     beclomethasone (QVAR) 40 MCG/ACT Inhaler Inhale 2 puffs into the lungs 2 times daily     albuterol (VENTOLIN HFA) 108 (90 BASE) MCG/ACT inhaler Inhale 1-2 puffs into the lungs every 4 hours as needed     No current facility-administered medications for this visit.           PHYSICAL EXAMINATION:  LMP 01/18/2013          Data: All pertinent previous laboratory data reviewed     No results found for: PH, PHARTERIAL, PO2, [id number], SAT, PCO2, HCO3, BASEEXCESS, MOISES, BEB  Lab Results   Component Value Date    TSH 2.40 01/24/2018    TSH 2.88 07/10/2017     Lab Results   Component Value Date     (H) 03/06/2018     (H) 01/24/2018     Lab Results   Component Value Date    HGB 13.4 10/17/2007    HGB 14.4 02/15/2006     Lab Results   Component Value Date    BUN 18 01/24/2018    BUN 13 04/10/2017    CR 0.73 01/24/2018    CR 0.52 04/10/2017     No results found for: RANDAL     Fifteen minutes spent with patient, all of which were spent face-to-face counseling, consulting, coordinating plan of care, going over sleep test results and chart review.          Mir De Jesus MD 3/20/2018   [facility]  [address], [place] [address]   [phone] Clinic      CC: No ref. provider found  Copy to: Heidy Jama

## 2018-03-20 NOTE — PATIENT INSTRUCTIONS
MY TREATMENT INFORMATION FOR SLEEP APNEA-  Lala George    MY CONTACT NUMBERS ARE:  [phone]  DOCTOR : Mir De Jesus MD  SLEEP CENTER :  [place]  CPAP EQUIPMENT     You have severe sleep apnea with low oxygen, auto-CPAP is prescribed for you.   AHI 0-5/hr normal  AHI 5-15 evens of hour is a mild sleep apnea  AHI 15-30/hr is moderate sleep apnea  AHI over 30/hr is severe sleep apnea)    CPAP therapy includes adaptation to a mask interface and a delivery of air pressure.    You will be provided with an auto-titrating CPAP with a pressure range of 8-12 cmH2O with heated humidity to limit nasal congestion. Adjust the heat level on humidifier to find a setting that prevents dry nose but does not cause condensation in the hose or mask. Use distilled water in the humidifier.    The CPAP has a ramp function that starts the pressure lower than your prescribed pressure and gradually increases it over a number of minutes.  This may make it easier to fall asleep.                  Try to use the CPAP every-night, all night, at least 7-8 hours each night.  Daytime naps are not advised, but use CPAP if taking naps. Many insurances require that we prove you are using the CPAP at least 4 hours on at least 70% of nights over a 30 day period. We have 90 days to meet those criteria.    Objective measure goal  Compliance  Goal >70% (preferrably 100%)  Leak   Goal < 10% (less than 24 L/min)  AHI  Goal < 5 events per hour   Usage  Goal 7-8 hours.         Patient was advised not to drive if drowsy or sleepy.                Discussed weight management and the impact of weight gain on sleep apnea.  Let me know if you snore or feel the pressure is too high.    You can get new supplies (mask, hose and filter) for your CPAP every 3-6 months, covered by insurance. You do not need to get supplies that often, but they are available if you would like them.  You may exchange the mask once within the first month if you feel the initial  "mask does not fit well.  Contact your medical equipment provider for equipment issues.  Please let me know if you have any return of snoring, daytime sleepiness or poor sleep quality. We will want to make sure your CPAP is adequately treating your apnea.  There is a website called CPAP.com that has accessories that may make CPAP use easier. Please visit it at your convenience.    Our phone number is [phone]    Follow-up 4-6 weeks after PAP usage.  Bring your CPAP machine with you to the follow up appointment.               Frequently asked questions:  1. What is Obstructive Sleep Apnea (LUCIO)? LUCIO is the most common type of sleep apnea. Apnea literally means, \"without breath.\" It is characterized by repetitive pauses in breathing, despite continued effort to breathe, and is usually associated with a reduction in blood oxygen saturation. Apneas can last 10 to over 60 seconds. It is caused by narrowing or collapse of the upper airway as muscles relax during sleep. Severity of sleep apnea is determined by frequency of breathing events and their effect on your sleep and oxygen levels determined during sleep testing.   2. What are the consequences of LUCIO? Symptoms include: daytime sleepiness- possibly increasing the risk of falling asleep while driving, unrefreshing/restless sleep, snoring, insomnia, waking frequently to urinate, waking with heartburn or reflux, reduced concentration and memory, and morning headaches. Other health consequences may include development of high blood pressure and other cardiovascular disease in persons who are susceptible. Untreated LUCIO  can contribute to heart disease, stroke and diabetes.   3. What are the treatment options? In most situations, sleep apnea is a lifelong disease that must be managed with daily therapy. Medications are not effective for sleep apnea and surgery is generally not performed until other therapies have been tried. Therapy is usually tailored to the individual " patient based on many factors including your wishes as well as severity of sleep apnea and severity of obesity. Continuous Positive Airway (CPAP) is the most reliable treatment. An oral device to hold your jaw forward is usually      IF I HAVE SLEEP APNEA.....  WHERE CAN I FIND MORE INFORMATION?    American Academy of Sleep Medicine Patient information on sleep disorders:  http://yoursleep.aasmnet.org    THINGS I SHOULD REMEMBER  In most situations, sleep apnea is a lifelong disease that must be managed with daily therapy. Untreated disease, when severe, may result in an increased risk for an array of problems from heart disease to mood changes, car accidents and shorter lifespan.    CPAP-  WHY AND HOW?                                    Continuous positive airway pressure, or CPAP, is the most effective treatment for obstructive sleep apnea. A decision to use CPAP is a major step forward in the pursuit of a healthier life. The successful use of CPAP will help you breathe easier, sleep better and live healthier. Using CPAP can be a positive experience if you keep these denise points in mind:  1. Commitment  CPAP is not a quick fix for your problem. It involves a long-term commitment to improve your sleep and your health.    2. Communication  Stay in close communication with both your sleep doctor and your CPAP supplier. Ask lots of questions and seek help when you need it.    3. Consistency  Use CPAP all night, every night and for every nap. You will receive the maximum health benefits from CPAP when you use it every time that you sleep. This will also make it easier for your body to adjust to the treatment.    4. Correction  The first machine and mask that you try may not be the best ones for you. Work with your sleep doctor and your CPAP supplier to make corrections to your equipment selection. Ask about trying a different type of machine or mask if you have ongoing problems. Make sure that your mask is a good fit  "and learn to use your equipment properly.    5. Challenge  Tell a family member or close friend to ask you each morning if you used your CPAP the previous night. Have someone to challenge you to give it your best effort.    6. Connection   Your adjustment to CPAP will be easier if you are able to connect with others who use the same treatment. Ask your sleep doctor if there is a support group in your area for people who have sleep apnea, or look for one on the Internet.    7. Comfort   Increase your level of comfort by using a saline spray, decongestant or heated humidifier if CPAP irritates your nose, mouth or throat. Use your unit's \"ramp\" setting to slowly get used to the air pressure level. There may be soft pads you can buy that will fit over your mask straps. Look on www.CPAP.com for accessories such as these straps, a pillow contoured for side-sleeping with CPAP, longer hoses, hose covers to reduce condensation, or stands to keep the hose out of your way.                                                              8. Cleaning   Clean your mask, tubing and headgear on a regular basis. Put this time in your schedule so that you don't forget to do it. Check and replace the filters for your CPAP unit and humidifier.    9. Completion   Although you are never finished with CPAP therapy, you should reward yourself by celebrating the completion of your first month of treatment. Expect this first month to be your hardest period of adjustment. It will involve some trial and error as you find the machine, mask and pressure settings that are right for you.    10. Continuation  After your first month of treatment, continue to make a daily commitment to use your CPAP all night, every night and for every nap.    CPAP-Tips to starting with success:  Begin using your CPAP for short periods of time during the day while you watch TV or read.    Use CPAP every night and for every nap. Using it less often reduces the health " benefits and makes it harder for your body to get used to it.    Newer CPAP models are virtually silent; however, if you find the sound of your CPAP machine to be bothersome, place the unit under your bed to dampen the sound.     Make small adjustments to your mask, tubing, straps and headgear until you get the right fit. Tightening the mask may actually worsen the leak.  If it leaves significant marks on your face or irritates the bridge of your nose, it may not be the best mask for you.  Speak with the person who supplied the mask and consider trying other masks.    Use a saline nasal spray to ease mild nasal congestion. Neti-Pot or saline nasal rinses may also help. Nasal gel sprays can help reduce nasal dryness.  Biotene mouthwash can be helpful to protect your teeth if you experience frequent dry mouth.  Dry mouth may be a sign of air escaping out of your mouth or out of the mask in the case of a full face mask.  Speak with your provider if you expect that is the case.     Take a nasal decongestant to relieve more severe nasal or sinus congestion.  Do not use Afrin (oxymetazoline) nasal spray more than 3 days in a row.  Speak with your sleep doctor if your nasal congestion is chronic.    Use a heated humidifier that fits your CPAP model to enhance your breathing comfort. Adjust the heat setting up if you get a dry nose or throat, down if you get condensation in the hose or mask.  Position the CPAP lower than you so that any condensation in the hose drains back into the machine rather than towards the mask.    Try a system that uses nasal pillows if traditional masks give you problems.    Clean your mask, tubing and headgear once a week. Make sure the equipment dries fully.    Regularly check and replace the filters for your CPAP unit and humidifier.    Work closely with your sleep doctor and your CPAP supplier to make sure that you have the machine, mask and air pressure setting that works best for  "you.        MASK DESENSITIZATION:    If you are experiencing some anxiety about trying a PAP mask or breathing with pressurized air try the following steps in sequence to get used to PAP. This is called \"desensitization\".    1.  Wear mask (disconnected from the device) for 60 minutes daily awake and use a distraction: Sit and watch TV, read, or listen to music with the mask on. Take the mask off and put it back on, as needed. This can be in a chair in the living room, for example.    2.  When you can use the mask without taking it off for 60 minutes and without anxiety, then proceed to step 3.    3.  Attach the mask to the PAP device, and switch the unit  on  and practice breathing through the mask for one hour while watching television, reading or performing some other sedentary, distracting activity.     4.  Once you are comfortable wearing PAP for 30-60 minutes without anxiety while distracted with an activity, try to use it in bed. You can continue distraction in bed by watching TV, reading, listening to music or an audio book, etc.  The main goal is to  not think about using PAP  but pay attention to relaxing.    5. Use the PAP during scheduled one-hour naps at home.    6. Use PAP during initial 3-4 hours of nocturnal sleep.    7. Use PAP through an entire night of sleep.        Your BMI is Body mass index is 37.44 kg/(m^2).  Weight management is a personal decision.  If you are interested in exploring weight loss strategies, the following discussion covers the approaches that may be successful. Body mass index (BMI) is one way to tell whether you are at a healthy weight, overweight, or obese. It measures your weight in relation to your height.  A BMI of 18.5 to 24.9 is in the healthy range. A person with a BMI of 25 to 29.9 is considered overweight, and someone with a BMI of 30 or greater is considered obese. More than two-thirds of American adults are considered overweight or obese.  Being overweight or obese " increases the risk for further weight gain. Excess weight may lead to heart disease and diabetes.  Creating and following plans for healthy eating and physical activity may help you improve your health.  Weight control is part of healthy lifestyle and includes exercise, emotional health, and healthy eating habits. Careful eating habits lifelong are the mainstay of weight control. Though there are significant health benefits from weight loss, long-term weight loss with diet alone may be very difficult to achieve- studies show long-term success with dietary management in less than 10% of people. Attaining a healthy weight may be especially difficult to achieve in those with severe obesity. In some cases, medications, devices and surgical management might be considered.  What can you do?  If you are overweight or obese and are interested in methods for weight loss, you should discuss this with your provider.     Consider reducing daily calorie intake by 500 calories.     Keep a food journal.     Avoiding skipping meals, consider cutting portions instead.    Diet combined with exercise helps maintain muscle while optimizing fat loss. Strength training is particularly important for building and maintaining muscle mass. Exercise helps reduce stress, increase energy, and improves fitness. Increasing exercise without diet control, however, may not burn enough calories to loose weight.       Start walking three days a week 10-20 minutes at a time    Work towards walking thirty minutes five days a week     Eventually, increase the speed of your walking for 1-2 minutes at time    In addition, we recommend that you review healthy lifestyles and methods for weight loss available through the National Institutes of Health patient information sites:  http://win.niddk.nih.gov/publications/index.htm    And look into health and wellness programs that may be available through your health insurance provider, employer, local community  center, or winter club.    Weight management plan: Patient was referred to their PCP to discuss a diet and exercise plan.     Your blood pressure was checked while you were in clinic today.  Please read the guidelines below about what these numbers mean and what you should do about them.  Your systolic blood pressure is the top number.  This is the pressure when the heart is pumping.  Your diastolic blood pressure is the bottom number.  This is the pressure in between beats.  If your systolic blood pressure is less than 120 and your diastolic blood pressure is less than 80, then your blood pressure is normal. There is nothing more that you need to do about it  If your systolic blood pressure is 120-139 or your diastolic blood pressure is 80-89, your blood pressure may be higher than it should be.  You should have your blood pressure re-checked within a year by a primary care provider.  If your systolic blood pressure is 140 or greater or your diastolic blood pressure is 90 or greater, you may have high blood pressure.  High blood pressure is treatable, but if left untreated over time it can put you at risk for heart attack, stroke, or kidney failure.  You should have your blood pressure re-checked by a primary care provider within the next four weeks.

## 2018-03-20 NOTE — NURSING NOTE
"Chief Complaint   Patient presents with     RECHECK     f/u Hst 3/12       Initial /86  Pulse 100  Resp 15  Ht 1.651 m (5' 5\")  Wt 102.1 kg (225 lb)  LMP 01/18/2013  SpO2 95%  BMI 37.44 kg/m2 Estimated body mass index is 37.44 kg/(m^2) as calculated from the following:    Height as of this encounter: 1.651 m (5' 5\").    Weight as of this encounter: 102.1 kg (225 lb).  Medication Reconciliation: complete         Niru Murillo LPN/DIANE  "

## 2018-03-20 NOTE — MR AVS SNAPSHOT
After Visit Summary   3/20/2018    Lala George    MRN: [id number]           Patient Information     Date Of Birth          1970        Visit Information        Provider Department      3/20/2018 4:30 PM Mir De Jesus MD [facility] [place]        Today's Diagnoses     LUCIO (obstructive sleep apnea)    -  1    Sleep related hypoxia        Obesity (BMI 30-39.9)          Care Instructions    MY TREATMENT INFORMATION FOR SLEEP APNEA-  Lala George    MY CONTACT NUMBERS ARE:  [phone]  DOCTOR : Mir De Jesus MD  SLEEP CENTER :  [place]  CPAP EQUIPMENT     You have severe sleep apnea with low oxygen, auto-CPAP is prescribed for you.   AHI 0-5/hr normal  AHI 5-15 evens of hour is a mild sleep apnea  AHI 15-30/hr is moderate sleep apnea  AHI over 30/hr is severe sleep apnea)    CPAP therapy includes adaptation to a mask interface and a delivery of air pressure.    You will be provided with an auto-titrating CPAP with a pressure range of 8-12 cmH2O with heated humidity to limit nasal congestion. Adjust the heat level on humidifier to find a setting that prevents dry nose but does not cause condensation in the hose or mask. Use distilled water in the humidifier.    The CPAP has a ramp function that starts the pressure lower than your prescribed pressure and gradually increases it over a number of minutes.  This may make it easier to fall asleep.                  Try to use the CPAP every-night, all night, at least 7-8 hours each night.  Daytime naps are not advised, but use CPAP if taking naps. Many insurances require that we prove you are using the CPAP at least 4 hours on at least 70% of nights over a 30 day period. We have 90 days to meet those criteria.    Objective measure goal  Compliance  Goal >70% (preferrably 100%)  Leak   Goal < 10% (less than 24 L/min)  AHI  Goal < 5 events per hour   Usage  Goal 7-8 hours.         Patient was advised not to drive if drowsy or  "sleepy.                Discussed weight management and the impact of weight gain on sleep apnea.  Let me know if you snore or feel the pressure is too high.    You can get new supplies (mask, hose and filter) for your CPAP every 3-6 months, covered by insurance. You do not need to get supplies that often, but they are available if you would like them.  You may exchange the mask once within the first month if you feel the initial mask does not fit well.  Contact your medical equipment provider for equipment issues.  Please let me know if you have any return of snoring, daytime sleepiness or poor sleep quality. We will want to make sure your CPAP is adequately treating your apnea.  There is a website called CPAP.com that has accessories that may make CPAP use easier. Please visit it at your convenience.    Our phone number is [phone]    Follow-up 4-6 weeks after PAP usage.  Bring your CPAP machine with you to the follow up appointment.               Frequently asked questions:  1. What is Obstructive Sleep Apnea (LUCIO)? LUCIO is the most common type of sleep apnea. Apnea literally means, \"without breath.\" It is characterized by repetitive pauses in breathing, despite continued effort to breathe, and is usually associated with a reduction in blood oxygen saturation. Apneas can last 10 to over 60 seconds. It is caused by narrowing or collapse of the upper airway as muscles relax during sleep. Severity of sleep apnea is determined by frequency of breathing events and their effect on your sleep and oxygen levels determined during sleep testing.   2. What are the consequences of LUCIO? Symptoms include: daytime sleepiness- possibly increasing the risk of falling asleep while driving, unrefreshing/restless sleep, snoring, insomnia, waking frequently to urinate, waking with heartburn or reflux, reduced concentration and memory, and morning headaches. Other health consequences may include development of high blood pressure " and other cardiovascular disease in persons who are susceptible. Untreated LUCIO  can contribute to heart disease, stroke and diabetes.   3. What are the treatment options? In most situations, sleep apnea is a lifelong disease that must be managed with daily therapy. Medications are not effective for sleep apnea and surgery is generally not performed until other therapies have been tried. Therapy is usually tailored to the individual patient based on many factors including your wishes as well as severity of sleep apnea and severity of obesity. Continuous Positive Airway (CPAP) is the most reliable treatment. An oral device to hold your jaw forward is usually      IF I HAVE SLEEP APNEA.....  WHERE CAN I FIND MORE INFORMATION?    American Academy of Sleep Medicine Patient information on sleep disorders:  http://yoursleep.aasmnet.org    THINGS I SHOULD REMEMBER  In most situations, sleep apnea is a lifelong disease that must be managed with daily therapy. Untreated disease, when severe, may result in an increased risk for an array of problems from heart disease to mood changes, car accidents and shorter lifespan.    CPAP-  WHY AND HOW?                                    Continuous positive airway pressure, or CPAP, is the most effective treatment for obstructive sleep apnea. A decision to use CPAP is a major step forward in the pursuit of a healthier life. The successful use of CPAP will help you breathe easier, sleep better and live healthier. Using CPAP can be a positive experience if you keep these denise points in mind:  1. Commitment  CPAP is not a quick fix for your problem. It involves a long-term commitment to improve your sleep and your health.    2. Communication  Stay in close communication with both your sleep doctor and your CPAP supplier. Ask lots of questions and seek help when you need it.    3. Consistency  Use CPAP all night, every night and for every nap. You will receive the maximum health benefits from  "CPAP when you use it every time that you sleep. This will also make it easier for your body to adjust to the treatment.    4. Correction  The first machine and mask that you try may not be the best ones for you. Work with your sleep doctor and your CPAP supplier to make corrections to your equipment selection. Ask about trying a different type of machine or mask if you have ongoing problems. Make sure that your mask is a good fit and learn to use your equipment properly.    5. Challenge  Tell a family member or close friend to ask you each morning if you used your CPAP the previous night. Have someone to challenge you to give it your best effort.    6. Connection   Your adjustment to CPAP will be easier if you are able to connect with others who use the same treatment. Ask your sleep doctor if there is a support group in your area for people who have sleep apnea, or look for one on the Internet.    7. Comfort   Increase your level of comfort by using a saline spray, decongestant or heated humidifier if CPAP irritates your nose, mouth or throat. Use your unit's \"ramp\" setting to slowly get used to the air pressure level. There may be soft pads you can buy that will fit over your mask straps. Look on www.CPAP.com for accessories such as these straps, a pillow contoured for side-sleeping with CPAP, longer hoses, hose covers to reduce condensation, or stands to keep the hose out of your way.                                                              8. Cleaning   Clean your mask, tubing and headgear on a regular basis. Put this time in your schedule so that you don't forget to do it. Check and replace the filters for your CPAP unit and humidifier.    9. Completion   Although you are never finished with CPAP therapy, you should reward yourself by celebrating the completion of your first month of treatment. Expect this first month to be your hardest period of adjustment. It will involve some trial and error as you find " the machine, mask and pressure settings that are right for you.    10. Continuation  After your first month of treatment, continue to make a daily commitment to use your CPAP all night, every night and for every nap.    CPAP-Tips to starting with success:  Begin using your CPAP for short periods of time during the day while you watch TV or read.    Use CPAP every night and for every nap. Using it less often reduces the health benefits and makes it harder for your body to get used to it.    Newer CPAP models are virtually silent; however, if you find the sound of your CPAP machine to be bothersome, place the unit under your bed to dampen the sound.     Make small adjustments to your mask, tubing, straps and headgear until you get the right fit. Tightening the mask may actually worsen the leak.  If it leaves significant marks on your face or irritates the bridge of your nose, it may not be the best mask for you.  Speak with the person who supplied the mask and consider trying other masks.    Use a saline nasal spray to ease mild nasal congestion. Neti-Pot or saline nasal rinses may also help. Nasal gel sprays can help reduce nasal dryness.  Biotene mouthwash can be helpful to protect your teeth if you experience frequent dry mouth.  Dry mouth may be a sign of air escaping out of your mouth or out of the mask in the case of a full face mask.  Speak with your provider if you expect that is the case.     Take a nasal decongestant to relieve more severe nasal or sinus congestion.  Do not use Afrin (oxymetazoline) nasal spray more than 3 days in a row.  Speak with your sleep doctor if your nasal congestion is chronic.    Use a heated humidifier that fits your CPAP model to enhance your breathing comfort. Adjust the heat setting up if you get a dry nose or throat, down if you get condensation in the hose or mask.  Position the CPAP lower than you so that any condensation in the hose drains back into the machine rather than  "towards the mask.    Try a system that uses nasal pillows if traditional masks give you problems.    Clean your mask, tubing and headgear once a week. Make sure the equipment dries fully.    Regularly check and replace the filters for your CPAP unit and humidifier.    Work closely with your sleep doctor and your CPAP supplier to make sure that you have the machine, mask and air pressure setting that works best for you.        MASK DESENSITIZATION:    If you are experiencing some anxiety about trying a PAP mask or breathing with pressurized air try the following steps in sequence to get used to PAP. This is called \"desensitization\".    1.  Wear mask (disconnected from the device) for 60 minutes daily awake and use a distraction: Sit and watch TV, read, or listen to music with the mask on. Take the mask off and put it back on, as needed. This can be in a chair in the living room, for example.    2.  When you can use the mask without taking it off for 60 minutes and without anxiety, then proceed to step 3.    3.  Attach the mask to the PAP device, and switch the unit  on  and practice breathing through the mask for one hour while watching television, reading or performing some other sedentary, distracting activity.     4.  Once you are comfortable wearing PAP for 30-60 minutes without anxiety while distracted with an activity, try to use it in bed. You can continue distraction in bed by watching TV, reading, listening to music or an audio book, etc.  The main goal is to  not think about using PAP  but pay attention to relaxing.    5. Use the PAP during scheduled one-hour naps at home.    6. Use PAP during initial 3-4 hours of nocturnal sleep.    7. Use PAP through an entire night of sleep.        Your BMI is Body mass index is 37.44 kg/(m^2).  Weight management is a personal decision.  If you are interested in exploring weight loss strategies, the following discussion covers the approaches that may be successful. Body " mass index (BMI) is one way to tell whether you are at a healthy weight, overweight, or obese. It measures your weight in relation to your height.  A BMI of 18.5 to 24.9 is in the healthy range. A person with a BMI of 25 to 29.9 is considered overweight, and someone with a BMI of 30 or greater is considered obese. More than two-thirds of American adults are considered overweight or obese.  Being overweight or obese increases the risk for further weight gain. Excess weight may lead to heart disease and diabetes.  Creating and following plans for healthy eating and physical activity may help you improve your health.  Weight control is part of healthy lifestyle and includes exercise, emotional health, and healthy eating habits. Careful eating habits lifelong are the mainstay of weight control. Though there are significant health benefits from weight loss, long-term weight loss with diet alone may be very difficult to achieve- studies show long-term success with dietary management in less than 10% of people. Attaining a healthy weight may be especially difficult to achieve in those with severe obesity. In some cases, medications, devices and surgical management might be considered.  What can you do?  If you are overweight or obese and are interested in methods for weight loss, you should discuss this with your provider.     Consider reducing daily calorie intake by 500 calories.     Keep a food journal.     Avoiding skipping meals, consider cutting portions instead.    Diet combined with exercise helps maintain muscle while optimizing fat loss. Strength training is particularly important for building and maintaining muscle mass. Exercise helps reduce stress, increase energy, and improves fitness. Increasing exercise without diet control, however, may not burn enough calories to loose weight.       Start walking three days a week 10-20 minutes at a time    Work towards walking thirty minutes five days a week      Eventually, increase the speed of your walking for 1-2 minutes at time    In addition, we recommend that you review healthy lifestyles and methods for weight loss available through the National Institutes of Health patient information sites:  http://win.niddk.nih.gov/publications/index.htm    And look into health and wellness programs that may be available through your health insurance provider, employer, local community center, or winter club.    Weight management plan: Patient was referred to their PCP to discuss a diet and exercise plan.     Your blood pressure was checked while you were in clinic today.  Please read the guidelines below about what these numbers mean and what you should do about them.  Your systolic blood pressure is the top number.  This is the pressure when the heart is pumping.  Your diastolic blood pressure is the bottom number.  This is the pressure in between beats.  If your systolic blood pressure is less than 120 and your diastolic blood pressure is less than 80, then your blood pressure is normal. There is nothing more that you need to do about it  If your systolic blood pressure is 120-139 or your diastolic blood pressure is 80-89, your blood pressure may be higher than it should be.  You should have your blood pressure re-checked within a year by a primary care provider.  If your systolic blood pressure is 140 or greater or your diastolic blood pressure is 90 or greater, you may have high blood pressure.  High blood pressure is treatable, but if left untreated over time it can put you at risk for heart attack, stroke, or kidney failure.  You should have your blood pressure re-checked by a primary care provider within the next four weeks.              Follow-ups after your visit        Your next 10 appointments already scheduled     Apr 03, 2018  2:30 PM CDT   Diabetic Education with [facility]   [facility] ([facility])    [address]  [facility] [address]  [facility] [address]   [phone]            Apr 23, 2018  2:00 PM CDT   [facility] with EDMOND Velez CNP   [facility]    [address] [place] [address]. [facility] [address] [phone]            Apr 23, 2018  2:00 PM CDT   LAB with [facility]   [facility]    [address] [place] [address] [phone]           Please do not eat 10-12 hours before your appointment if you are coming in fasting for labs on lipids, cholesterol, or glucose (sugar). This does not apply to pregnant women. Water, hot tea and black coffee (with nothing added) are okay. Do not drink other fluids, diet soda or chew gum.              Who to contact     If you have questions or need follow up information about today's clinic visit or your schedule please contact [facility] directly at [phone].  Normal or non-critical lab and imaging results will be communicated to you by Measurablhart, letter or phone within 4 business days after the clinic has received the results. If you do not hear from us within 7 days, please contact the clinic through TempMine or phone. If you have a critical or abnormal lab result, we will notify you by phone as soon as possible.  Submit refill requests through TempMine or call your pharmacy and they will forward the refill request to us. Please allow 3 business days for your refill to be completed.          Additional Information About Your Visit        TempMine Information     [facility] gives you secure access to your electronic health record. If you see a primary care provider, you can also send messages to your care team and make appointments. If you have questions, please call your primary care clinic.  If you do not have a primary care provider, please call [phone] and they will assist you.        Care EveryWhere ID     This is  "your Care EveryWhere ID. This could be used by other organizations to access your [facility] medical records  [id number]        Your Vitals Were     Pulse Respirations Height Last Period Pulse Oximetry BMI (Body Mass Index)    100 15 1.651 m (5' 5\") 01/18/2013 95% 37.44 kg/m2       Blood Pressure from Last 3 Encounters:   03/20/18 134/86   03/06/18 138/90   02/27/18 135/90    Weight from Last 3 Encounters:   03/20/18 102.1 kg (225 lb)   03/06/18 102.1 kg (225 lb)   02/27/18 99.8 kg (220 lb)              We Performed the Following     Comprehensive DME        Primary Care Provider Office Phone # Fax #    Heidy Jama [phone] [address] [place] [address]        Equal Access to Services     [facility]: Hadii aad ku hadasho Somike, waaxda luqadaha, qaybta kaalmada adearanzayada, eulalia black . So [facility] [phone].    ATENCIÓN: Si habla español, tiene a stallworth disposición servicios gratuitos de asistencia lingüística. Llame al [phone].    We comply with applicable federal civil rights laws and [place] laws. We do not discriminate on the basis of race, color, national origin, age, disability, sex, sexual orientation, or gender identity.            Thank you!     Thank you for choosing [facility]  for your care. Our goal is always to provide you with excellent care. Hearing back from our patients is one way we can continue to improve our services. Please take a few minutes to complete the written survey that you may receive in the mail after your visit with us. Thank you!             Your Updated Medication List - Protect others around you: Learn how to safely use, store and throw away your medicines at www.disposemymeds.org.          This list is accurate as of 3/20/18  4:40 PM.  Always use your most recent med list.                   Brand Name Dispense Instructions for use Diagnosis    albiglutide 50 MG pen    " TANZEUM    12 each    Inject 50 mg Subcutaneous once a week    Uncontrolled type 2 diabetes mellitus without complication, with long-term current use of insulin (H)       albuterol 108 (90 BASE) MCG/ACT Inhaler    VENTOLIN HFA    1 Inhaler    Inhale 1-2 puffs into the lungs every 4 hours as needed    Routine general medical examination at a health care facility       atorvastatin 10 MG tablet    LIPITOR    30 tablet    Take 1 tablet (10 mg) by mouth daily    Uncontrolled type 2 diabetes mellitus without complication, with long-term current use of insulin (H), Hyperlipidemia LDL goal <100       BASAGLAR 100 UNIT/ML injection     60 mL    50 units once daily.    Uncontrolled type 2 diabetes mellitus without complication, with long-term current use of insulin (H)       beclomethasone 40 MCG/ACT Inhaler    QVAR    1 Inhaler    Inhale 2 puffs into the lungs 2 times daily    Mild persistent asthma without complication       * blood glucose monitoring meter device kit    no brand specified    1 kit    Use to test blood sugar 2 times daily or as directed. One touch or as covered by insurance.    Type 2 diabetes mellitus with stage 1 chronic kidney disease, without long-term current use of insulin (H)       * blood glucose monitoring meter device kit     1 kit    Check blood sugar two times daily or as directed    Uncontrolled type 2 diabetes mellitus without complication, with long-term current use of insulin (H)       blood glucose monitoring test strip    no brand specified    200 strip    Use to test blood sugars 2 times daily or as directed. One touch or as covered by insurance    Type 2 diabetes mellitus with stage 1 chronic kidney disease, without long-term current use of insulin (H)       HumaLOG KWIKpen 100 UNIT/ML injection   Generic drug:  insulin lispro     15 mL    Inject 10 Units Subcutaneous 3 times daily (before meals)    Uncontrolled type 2 diabetes mellitus without complication, with long-term current use  of insulin (H)       hydrochlorothiazide 25 MG tablet    HYDRODIURIL    30 tablet    Take 1 tablet (25 mg) by mouth every morning    Essential hypertension, benign, Uncontrolled type 2 diabetes mellitus without complication, with long-term current use of insulin (H)       insulin degludec 200 UNIT/ML pen    TRESIBA    18 mL    Inject 50 Units Subcutaneous daily    Uncontrolled type 2 diabetes mellitus without complication, with long-term current use of insulin (H)       losartan 100 MG tablet    COZAAR    30 tablet    Take 1 tablet (100 mg) by mouth daily    Uncontrolled type 2 diabetes mellitus without complication, with long-term current use of insulin (H), Essential hypertension, benign       metFORMIN 500 MG 24 hr tablet    GLUCOPHAGE-XR    120 tablet    Take 4 tablets (2,000 mg) by mouth daily (with dinner)    Uncontrolled type 2 diabetes mellitus without complication, with long-term current use of insulin (H)       sertraline 50 MG tablet    ZOLOFT    30 tablet    Take 1 tablet (50 mg) by mouth daily    Adjustment disorder with mixed anxiety and depressed mood       ULTICARE MINI 31G X 6 MM   Generic drug:  insulin pen needle     600 each    Use 10 pen needles daily or as directed.    Type 2 diabetes mellitus, uncontrolled (H)       * Notice:  This list has 2 medication(s) that are the same as other medications prescribed for you. Read the directions carefully, and ask your doctor or other care provider to review them with you.

## 2018-03-21 RX ORDER — FLASH GLUCOSE SENSOR
1 KIT MISCELLANEOUS PRN
Qty: 1 DEVICE | Refills: 1 | Status: SHIPPED | OUTPATIENT
Start: 2018-03-21 | End: 2018-12-06

## 2018-03-21 RX ORDER — FLASH GLUCOSE SENSOR
KIT MISCELLANEOUS
Qty: 3 EACH | Refills: 6 | Status: SHIPPED | OUTPATIENT
Start: 2018-03-21 | End: 2018-12-06

## 2018-03-24 ENCOUNTER — DOCUMENTATION ONLY (OUTPATIENT)
Dept: ENDOCRINOLOGY | Facility: CLINIC | Age: 48
End: 2018-03-24
Payer: COMMERCIAL

## 2018-03-24 PROCEDURE — 95251 CONT GLUC MNTR ANALYSIS I&R: CPT | Performed by: CLINICAL NURSE SPECIALIST

## 2018-03-25 NOTE — PROGRESS NOTES
LibrePro Continuous Glucose Monitor Interpretation   Diagnostic CGM replaced last week -  Humalog insulin start 3/14/18     Patient History:   1. Type of Diabetes: Type 2 diabetes  2. Duration of diabetes or year of diagnosis:   3. Current treatment regimen:     Diabetes Medication(s)     Biguanides Sig     metFORMIN (GLUCOPHAGE-XR) 500 MG 24 hr tablet Take 4 tablets (2,000 mg) by mouth daily (with dinner)    Insulin Sig     HUMALOG KWIKPEN 100 UNIT/ML soln Inject 10 Units Subcutaneous 3 times daily (before meals)     insulin degludec (TRESIBA) 200 UNIT/ML pen Inject 50 Units Subcutaneous daily    Incretin Mimetic Agents (GLP-1 Receptor Agonists) Sig         4. Most recent A1c values:        Lab Results   Component Value Date     A1C 10.1 2018     A1C 13.0 04/10/2017     A1C 13.3 2017      5. Indication/s for LibrePro study: Difficult to manage hypoglycemia and/or hyperglycemia, despite multiple adjustments in self-monitoring of blood glucose and diabetes medication administration and Unexplained fluctuations in glucose values.          Statistics:   1. Sensor worn for 7 days.  2. Statistics:    Percent in target is: 8%  Percent above target is: 92%  (percent above 250 mg/dl: 51%)  Percent below target is: 0%  3.  Estimated A1c: 10.8%      Data evaluation:   Ambulatory Glucose Profile (AGP) shows the followin. Consistent day-to-day patterns noted: pattern of daytime hyperglycemia, pattern of nocturnal hyperglycemia.  2. Average glucose: 262 mg/dL.  SD (Standard Deviation): 64.6  3. Hypoglycemia: None     Patient's Logbook shows the following:  Log is incomplete- see below  Carbohydrate counting is: pt is working on being more carb aware and limiting intake of candy/sweets  Medication and/or insulin dosing is: has been inconsistent with taking Humalog- has missed several doses, is still using up Basaglar prior to switching to Tresiba     Education provided today:  AADE Self-Care  Behaviors:  Monitoring: review of CGM report and BG's  Taking Medication: action of prescribed medication, when to take medications      Opportunities for ongoing education and support in diabetes-self management were discussed.     Pt verbalized understanding of concepts discussed and recommendations provided today.        Educational and other materials:  Copy of diagnostic CGM report     Assessment:  Note some BG  improvement since start of Humalog insulin. Reinforced  importance of taking insulin as directed.  Pt would benefit from personal CGM-  pt agreeable     Plan:  1. Pt will work on insulin adherence- avoid missed doses of Humalog. No change in doses at today's visit.  2. Follow up in 2 weeks, remove diagnostic CGM on 3/27/18 and bring to next visit.  3. Start personal Sandi CGM. Bring to next appt for instruction.  4. Increase BG monitoring (before/ after meals). Keep a food/ insulin record for next visit.      Bessie Loomis RD, JEFFREY Boudreaux NP  [facility]

## 2018-03-28 ENCOUNTER — DOCUMENTATION ONLY (OUTPATIENT)
Dept: SLEEP MEDICINE | Facility: CLINIC | Age: 48
End: 2018-03-28
Payer: COMMERCIAL

## 2018-03-28 NOTE — PROGRESS NOTES
Patient was offered choice of vendor and chose [facility].  Patient Lala George was set up at [place] on March 28, 2018. Patient received a Resmed AirSense 10 Auto. Pressures were set at 8-12 cm H2O.   Patient s ramp is 5 cm H2O for Auto and FLEX/EPR is 2.  Patient received a Resmed Airfit N20  Nasal mask Size Medium, heated tubing and heated humidifier.  Patient is enrolled in the STM Program and does not need to meet compliance. Patient has a follow up on 05/14/2018 with Dr. De Jesus.    KALI TREVIÑO

## 2018-04-02 ENCOUNTER — DOCUMENTATION ONLY (OUTPATIENT)
Dept: SLEEP MEDICINE | Facility: CLINIC | Age: 48
End: 2018-04-02

## 2018-04-02 NOTE — PROGRESS NOTES
3 DAY STM VISIT    Patient contacted for 3 day STM visit  Subjective measures:  Pt states things are going well so far.  She wears it for a few hours a night and then has to take it off. She feels like she just needs time to get used to it.  Pt is benefiting from therapy.     Device type: Auto-CPAP  PAP settings from order::  CPAP min 8 cm  H20       CPAP max 12 cm  H20  Device settings from machine      Min CPAP 8.0            Max CPAP 12.0      Assessment: Nightly usage, most nights under four hours.  Action plan: Pt to have f/u 14 day STM visit.  Diagnostic AHI:  67.5

## 2018-04-09 ENCOUNTER — TELEPHONE (OUTPATIENT)
Dept: EDUCATION SERVICES | Facility: CLINIC | Age: 48
End: 2018-04-09

## 2018-04-09 NOTE — TELEPHONE ENCOUNTER
Patient called with questions about if she can start the Sandi Flash sensor at home or if she needs to wait until her appointment on 4/13/18.    CDE called patient back left voicemail:  Informed patient that the product is a self starting device and the instructions should be clear enough that she can start the product at home.  If she reads through the instructions and has further questions, then she should wait to start the product at her 4/13/18 appointment.  Informed patient that if she is able to start at home then the follow up appointment would be going over the information and further tips on how to use the product day to day.    Denise Xiao MS, RD, LD, CDE

## 2018-04-12 ENCOUNTER — DOCUMENTATION ONLY (OUTPATIENT)
Dept: SLEEP MEDICINE | Facility: CLINIC | Age: 48
End: 2018-04-12

## 2018-04-12 NOTE — PROGRESS NOTES
14 DAY STM VISIT    Message left for patient to return call     Assessment: Pt not meeting objective benchmarks for compliance     Action plan: waiting for patient to return call.  and pt to have 30 day STM visit.    Device type: Auto-CPAP  PAP settings:  CPAP min 8 cm  H20      CPAP max 12 cm  H20     95th% pressure 11.8 cm   Objective measures: 14 day rolling measures      Compliance  50 %      Leak  25.84 lpm  last  upload      AHI 3.49   last  upload      Average number of minutes 237     Average hours of usage 3.9    Diagnostic AHI:  67.5      Objective measure goal  Compliance   Goal >70%  Leak   Goal < 24 lpm  AHI  Goal < 5  Usage  Goal >240

## 2018-04-16 NOTE — PROGRESS NOTES
Patient returned call.     Subjective measures: Pt states things are going well and has no issues or complaints. 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 She does get up to go the bathroom and sometimes forgets to put it back on.  Pt is benefiting from therapy. Patient meeting subjective benchmarks.     Action plan:pt to have 30 day STM visit.

## 2018-04-23 ENCOUNTER — OFFICE VISIT (OUTPATIENT)
Dept: ENDOCRINOLOGY | Facility: CLINIC | Age: 48
End: 2018-04-23
Payer: COMMERCIAL

## 2018-04-23 VITALS
DIASTOLIC BLOOD PRESSURE: 80 MMHG | WEIGHT: 220.6 LBS | HEART RATE: 76 BPM | TEMPERATURE: 98 F | SYSTOLIC BLOOD PRESSURE: 124 MMHG | BODY MASS INDEX: 36.71 KG/M2

## 2018-04-23 LAB — HBA1C MFR BLD: 11.9 % (ref 0–5.6)

## 2018-04-23 PROCEDURE — 83036 HEMOGLOBIN GLYCOSYLATED A1C: CPT | Performed by: CLINICAL NURSE SPECIALIST

## 2018-04-23 PROCEDURE — 99214 OFFICE O/P EST MOD 30 MIN: CPT | Performed by: CLINICAL NURSE SPECIALIST

## 2018-04-23 PROCEDURE — 36415 COLL VENOUS BLD VENIPUNCTURE: CPT | Performed by: CLINICAL NURSE SPECIALIST

## 2018-04-23 NOTE — PATIENT INSTRUCTIONS
Start the Tresiba, keep the dose at 50 units once daily for now.    Remember if you forget to take the Tresiba at bedime, you ca ntake the injection the next day and still take the usual 50 units that same night as long as there is at least 8 hours between the two injections.    Increase the Humalog to 15 units three times daily before each meal.    Add additional Humalog to correct the blood sugar if it is above 150 before each meal using the following scale:  If your blood sugar is:   - no additional novolog units (just the 15 units for the meal).  151-180 - +1 additional unit of Novolog  181-210 - + 2  211-240- +3  241-270 - +4  271-300 - +5  301-330 - +6  331-360 - + 7  361-390 - + 8  391-420 - +9  421-450 - +10  Over 450 - +11    Contact me or Bessie if you have any severe low blood sugars below 62 or you are having 3 or more lows below 70 in any one week period.      There are savings programs/cards for almost all the name brand products at the name of the drug.com    Novolog.com  Tresiba.com    Follow up in one month - either make an appointment with me or Bessie OR call ahead ( at the clinic here is [facility] or [facility], but I don't know who the  in at the [place] clinic- check with Bessie) and plan to stop in to the clinic to have your Sandi downloaded for Bessie or I to review and adjust your insulin.    Follow up with me in clinic by July 24, 2018 for a recheck and A1c.    Idalia Boudreaux NP  Endocrinology

## 2018-04-23 NOTE — MR AVS SNAPSHOT
After Visit Summary   4/23/2018    Lala George    MRN: [id number]           Patient Information     Date Of Birth          1970        Visit Information        Provider Department      4/23/2018 2:00 PM Idalia Boudreaux APRN [facility] Instructions        Start the Tresiba, keep the dose at 50 units once daily for now.    Remember if you forget to take the Tresiba at bedime, you ca ntake the injection the next day and still take the usual 50 units that same night as long as there is at least 8 hours between the two injections.    Increase the Humalog to 15 units three times daily before each meal.    Add additional Humalog to correct the blood sugar if it is above 150 before each meal using the following scale:  If your blood sugar is:   - no additional novolog units (just the 15 units for the meal).  151-180 - +1 additional unit of Novolog  181-210 - + 2  211-240- +3  241-270 - +4  271-300 - +5  301-330 - +6  331-360 - + 7  361-390 - + 8  391-420 - +9  421-450 - +10  Over 450 - +11    Contact me or Bessie if you have any severe low blood sugars below 62 or you are having 3 or more lows below 70 in any one week period.      There are savings programs/cards for almost all the name brand products at the name of the drug.com    Novolog.com  Tresiba.com    Follow up in one month - either make an appointment with me or Bessie OR call ahead ( at the clinic here is [facility], but I don't know who the  in at the [facility]- check with Bessie) and plan to stop in to the clinic to have your Sandi downloaded for Bessie or BURAK to review and adjust your insulin.    Follow up with me in clinic by July 24, 2018 for a recheck and A1c.    Idalia Boudreaux NP  Endocrinology            Follow-ups after your visit        Your next 10 appointments already scheduled     May 14, 2018  5:30 PM CDT   Return Sleep Patient with Mir De Jesus MD    [facility] ([facility])    [address]  [place] [address] [phone]              Who to contact     If you have questions or need follow up information about today's clinic visit or your schedule please contact [facility] directly at [phone].  Normal or non-critical lab and imaging results will be communicated to you by MyChart, letter or phone within 4 business days after the clinic has received the results. If you do not hear from us within 7 days, please contact the clinic through [employer] or phone. If you have a critical or abnormal lab result, we will notify you by phone as soon as possible.  Submit refill requests through [employer] or call your pharmacy and they will forward the refill request to us. Please allow 3 business days for your refill to be completed.          Additional Information About Your Visit        [employer] Information     [employer] gives you secure access to your electronic health record. If you see a primary care provider, you can also send messages to your care team and make appointments. If you have questions, please call your primary care clinic.  If you do not have a primary care provider, please call [phone] and they will assist you.        Care EveryWhere ID     This is your Care EveryWhere ID. This could be used by other organizations to access your [employer] medical records  [id number]        Your Vitals Were     Pulse Temperature Last Period Breastfeeding? BMI (Body Mass Index)       76 98  F (36.7  C) (Oral) 01/18/2013 No 36.71 kg/m2        Blood Pressure from Last 3 Encounters:   04/23/18 124/80   03/20/18 134/86   03/06/18 138/90    Weight from Last 3 Encounters:   04/23/18 100.1 kg (220 lb 9.6 oz)   03/20/18 102.1 kg (225 lb)   03/06/18 102.1 kg (225 lb)              Today, you had the following     No orders found for display       Primary Care Provider Office Phone # Fax #     Heidy Jama [phone] [address] [place] [address]        Equal Access to Services     LAUREN MEDRANO : Hadmonique holland das best Caro, luisda jeysonmonchoha, lenta kaisaccda sailaja, eulalia blunt. So [facility] [phone].    ATENCIÓN: Si habla español, tiene a stallworth disposición servicios gratuitos de asistencia lingüística. Llame al [phone].    We comply with applicable federal civil rights laws and [place] laws. We do not discriminate on the basis of race, color, national origin, age, disability, sex, sexual orientation, or gender identity.            Thank you!     Thank you for choosing [facility]  for your care. Our goal is always to provide you with excellent care. Hearing back from our patients is one way we can continue to improve our services. Please take a few minutes to complete the written survey that you may receive in the mail after your visit with us. Thank you!             Your Updated Medication List - Protect others around you: Learn how to safely use, store and throw away your medicines at www.disposemymeds.org.          This list is accurate as of 4/23/18  2:50 PM.  Always use your most recent med list.                   Brand Name Dispense Instructions for use Diagnosis    albiglutide 50 MG pen    TANZEUM    12 each    Inject 50 mg Subcutaneous once a week    Uncontrolled type 2 diabetes mellitus without complication, with long-term current use of insulin (H)       albuterol 108 (90 Base) MCG/ACT Inhaler    VENTOLIN HFA    1 Inhaler    Inhale 1-2 puffs into the lungs every 4 hours as needed    Routine general medical examination at a health care facility       atorvastatin 10 MG tablet    LIPITOR    30 tablet    Take 1 tablet (10 mg) by mouth daily    Uncontrolled type 2 diabetes mellitus without complication, with long-term current use of insulin (H), Hyperlipidemia LDL goal <100       BASAGLAR 100  UNIT/ML injection     60 mL    50 units once daily.    Uncontrolled type 2 diabetes mellitus without complication, with long-term current use of insulin (H)       beclomethasone 40 MCG/ACT Inhaler    QVAR    1 Inhaler    Inhale 2 puffs into the lungs 2 times daily    Mild persistent asthma without complication       * blood glucose monitoring meter device kit    no brand specified    1 kit    Use to test blood sugar 2 times daily or as directed. One touch or as covered by insurance.    Type 2 diabetes mellitus with stage 1 chronic kidney disease, without long-term current use of insulin (H)       * blood glucose monitoring meter device kit     1 kit    Check blood sugar two times daily or as directed    Uncontrolled type 2 diabetes mellitus without complication, with long-term current use of insulin (H)       blood glucose monitoring test strip    no brand specified    200 strip    Use to test blood sugars 2 times daily or as directed. One touch or as covered by insurance    Type 2 diabetes mellitus with stage 1 chronic kidney disease, without long-term current use of insulin (H)       continuous blood glucose monitoring sensor     3 each    For use with Freestyle Sandi Flash  for continuous monitioring of blood glucose levels. Replace sensor every 10 days.    Uncontrolled type 2 diabetes mellitus without complication, with long-term current use of insulin (H)       FREESTYLE SANDI READER Meagan     1 Device    1 kit as needed    Uncontrolled type 2 diabetes mellitus without complication, with long-term current use of insulin (H)       HumaLOG KWIKpen 100 UNIT/ML injection   Generic drug:  insulin lispro     15 mL    Inject 10 Units Subcutaneous 3 times daily (before meals)    Uncontrolled type 2 diabetes mellitus without complication, with long-term current use of insulin (H)       hydrochlorothiazide 25 MG tablet    HYDRODIURIL    30 tablet    Take 1 tablet (25 mg) by mouth every morning    Essential  hypertension, benign, Uncontrolled type 2 diabetes mellitus without complication, with long-term current use of insulin (H)       insulin degludec 200 UNIT/ML pen    TRESIBA    18 mL    Inject 50 Units Subcutaneous daily    Uncontrolled type 2 diabetes mellitus without complication, with long-term current use of insulin (H)       losartan 100 MG tablet    COZAAR    30 tablet    Take 1 tablet (100 mg) by mouth daily    Uncontrolled type 2 diabetes mellitus without complication, with long-term current use of insulin (H), Essential hypertension, benign       metFORMIN 500 MG 24 hr tablet    GLUCOPHAGE-XR    120 tablet    Take 4 tablets (2,000 mg) by mouth daily (with dinner)    Uncontrolled type 2 diabetes mellitus without complication, with long-term current use of insulin (H)       sertraline 50 MG tablet    ZOLOFT    30 tablet    Take 1 tablet (50 mg) by mouth daily    Adjustment disorder with mixed anxiety and depressed mood       ULTICARE MINI 31G X 6 MM   Generic drug:  insulin pen needle     600 each    Use 10 pen needles daily or as directed.    Type 2 diabetes mellitus, uncontrolled (H)       * Notice:  This list has 2 medication(s) that are the same as other medications prescribed for you. Read the directions carefully, and ask your doctor or other care provider to review them with you.

## 2018-04-23 NOTE — LETTER
2018         RE: Lala George  [address]  [facility] [address]        Dear Colleague,    Thank you for referring your patient, Lala George, to the [facility]. Please see a copy of my visit note below.    Name: Lala George  Seen at the request of Heidy Jama for Diabetes (3/6/2018).  HPI:  Lala George is a 47 year old female who presents for the evaluation/management of:    1. Type 2 DM:  Originally diagnosed: 2014.  Glucose slightly elevated 109-113 the year prior to diagnosis.  Glucose at the time of diagnosis was 317.    Current Regimen: Metformin XR 2000 mg/day, Tanzeum 50 mg weekly, Tresiba 50 units q hs, Humalog 10 units tid.  Initially treated with Metformin, Lantus insulin was added a short time after diagnosis. Lantus was later changed to Basaglar due to insurance, most recently changed to Tresiba.  Humalog recently added.  Was briefly treated with glipizide in , for about 2-3 months, discontinued due to ineffectiveness  Trulicity was added 2017, this was later changed to Tanzeum due to insurance formulary  BS checks: Continuous, Sandi personal CGM  Sandi:  Average B.  100% above target range.       Works night shift 10 pm to 6 am, Tuesday - .    Complications:   Diabetes Complications  Description / Detail    Diabetic Retinopathy  No retinopathy, last eye exam ?    CAD / PAD  No   Neuropathy  No   Nephropathy / Microalbuminuria  Elevated urine microalbumin x 1   Gastroparesis  No   Hypoglycemia Unawareness  No     2. Hypertension: Blood Pressure today:   BP Readings from Last 3 Encounters:   18 124/80   18 134/86   18 138/90   .  Blood pressure medications include losartan 100 mg qd, HCTZ 25 mg qd.  Takes medications everyday without forgetting a dose.  Denies feeling lightheaded or dizzy.    3. Hyperlipidemia: Takes atorvastatin 10 mg qd for lipid control.  Denies muscle aches of pains.       4.  Prevention:  Flu Shot- 9/2017  Pneumovax- 3/2/2015  Opthalmology-Yes: overdue, referral provided.  Dental-recommend semiannually  ASA-No  Smoking- No  PMH/PSH:  Past Medical History:   Diagnosis Date     BENIGN HYPERTENSION 2/15/2006     Mild persistent asthma      Past Surgical History:   Procedure Laterality Date     NO HISTORY OF SURGERY       Family Hx:  Family History   Problem Relation Age of Onset     Breast Cancer Mother 30     first diagnosed in her early 30's     Hypertension Mother      DIABETES Mother      C.A.ZACH. Mother      CHF 50s ,pacemaker late 50s, CABG 60s     C.A.ZACH. Maternal Aunt      60s     Glaucoma No family hx of      Macular Degeneration No family hx of      Thyroid disease: No         DM2: Yes: mother and one sibling         Autoimmune: DM1, SLE, RA, Vitiligo No    Social Hx:  Social History     Social History     Marital status: Single     Spouse name: N/A     Number of children: N/A     Years of education: N/A     Occupational History      Other     part time, [employer]     Social History Main Topics     Smoking status: Never Smoker     Smokeless tobacco: Never Used     Alcohol use Yes      Comment: rare--one to two per week     Drug use: No     Sexual activity: No     Other Topics Concern     Not on file     Social History Narrative          MEDICATIONS:  has a current medication list which includes the following prescription(s): albiglutide, albuterol, atorvastatin, basaglar, beclomethasone, blood glucose monitoring, blood glucose monitoring, blood glucose monitoring, freestyle estiven reader, continuous blood glucose monitoring, humalog kwikpen, hydrochlorothiazide, insulin degludec, losartan, metformin, sertraline, and ulticare mini.    ROS     ROS: 10 point ROS neg other than the symptoms noted above in the HPI.    Physical Exam   VS: /80 (BP Location: Right arm, Patient Position: Chair, Cuff Size: Adult Large)  Pulse 76  Temp 98  F (36.7  C) (Oral)  Wt 100.1 kg (220 lb 9.6 oz)   LMP 01/18/2013  Breastfeeding? No  BMI 36.71 kg/m2  GENERAL: AXOX3, NAD, well dressed, answering questions appropriately, appears stated age.  HEENT:  no exophthalmos, no proptosis, EOMI, no lig lag, no retraction  CV: RRR, no rubs, gallops, no murmurs  LUNGS: CTAB, no wheezes, rales, or rhonchi  ABDOMEN: soft, nontender, nondistended  EXTREMITIES: no edema, +pulses, no rashes, no lesions  NEUROLOGY: CN grossly intact, no tremors  MSK: grossly intact  SKIN: no rashes, no lesions    LABS:  A1c:   Component      Latest Ref Rng & Units 4/10/2017 1/24/2018 4/23/2018   Hemoglobin A1C      0 - 5.6 % 13.0 (H) 10.1 (H) 11.9 (H)     Basic Metabolic Panel:  !COMPREHENSIVE Latest Ref Rng & Units 1/24/2018   SODIUM 133 - 144 mmol/L 136   POTASSIUM 3.4 - 5.3 mmol/L 4.1   CHLORIDE 94 - 109 mmol/L 101   BUN 7 - 30 mg/dL 18   Creatinine 0.52 - 1.04 mg/dL 0.73   Glucose 70 - 99 mg/dL 344 (H)   ANION GAP 3 - 14 mmol/L 6   CALCIUM 8.5 - 10.1 mg/dL 9.0   ALBUMIN 3.4 - 5.0 g/dL 3.9   PROTEIN, TOTAL 6.8 - 8.8 g/dL 7.1     LFTS/Cholesterol Panel:  !LIPID/HEPATIC Latest Ref Rng & Units 2/21/2018   CHOLESTEROL <200 mg/dL 174   TRIGLYCERIDES <150 mg/dL 138   HDL CHOLESTEROL >49 mg/dL 62   LDL CHOLESTEROL, CALCULATED <100 mg/dL 84   VLDL-CHOLESTEROL 0 - 30 mg/dL    NON HDL CHOLESTEROL <130 mg/dL 112     !LIPID/HEPATIC Latest Ref Rng & Units 12/31/2014 4/1/2015 5/18/2016   AST 0 - 45 U/L 96 (H) 67 (H) 124 (H)   ALT 0 - 50 U/L 165 (H) 120 (H) 152 (H)     !LIPID/HEPATIC Latest Ref Rng & Units 1/4/2017 4/10/2017 1/24/2018   AST 0 - 45 U/L 68 (H) 35 28   ALT 0 - 50 U/L 104 (H) 70 (H) 37     Thyroid Function:   !THYROID Latest Ref Rng & Units 1/24/2018 7/10/2017 5/18/2016   TSH 0.40 - 4.00 mU/L 2.40 2.88 3.09     Urine MicroAlbumin:  Component    Latest Ref Rng & Units 1/21/2015 5/18/2016 1/24/2018   Creatinine Urine    mg/dL 100 73 41   Albumin Urine mg/L    mg/L 22 35 6   Albumin Urine mg/g Cr    0 - 25 mg/g Cr 22.11 47.94 (H) 14.99      Vitamin D:    All pertinent notes, labs, and images personally reviewed by me.     A/P  Ms.Shari George is a 47 year old here for the evaluation/management of diabetes:    1. DM2 - Uncontrolled.  Continue Tresiba 50 units qd.  Increase Humalog to 15 units tid + 1:30>150.  Follow up within one month with diabetes ed or myself, upload Sandi for review and further insulin dose adjustments.  Referral previously provided for eye exam  There is some variability among people, most will usually develop symptoms suggestive of hypoglycemia when blood glucose levels are lowered to the mid 60's. The first set of symptoms are called adrenergic. Patients may experience any of the following nervousness, sweating, intense hunger, trembling, weakness, palpitations, and difficulty speaking.   The acute management of hypoglycemia involves the rapid delivery of a source of easily absorbed sugar. Regular soda, juice, lifesavers, table sugar, are good options. 15 grams of glucose is the dose that is given, followed by an assessment of symptoms and a blood glucose check if possible. If after 10 minutes there is no improvement, another 10-15 grams should be given. This can be repeated up to three times. The equivalency of 10-15 grams of glucose (approximate servings) are: 3-5 hard candies, 3 teaspoons of sugar, or 1/2 cup of regular soda or juice.      2. Hypertension - Controlled.    3. Hyperlipidemia - On statin therapy.    Labs ordered today:   No orders of the defined types were placed in this encounter.      Radiology/Consults ordered today: None    All questions were answered.  The patient indicates understanding of the above issues and agrees with the plan set forth.  Total face to face time greater than or equal to 25 minutes.       Follow-up:  3 months in clinic with kobe Boudreaux NP  Endocrinology  [facility]  CC:     Again, thank you for allowing me to participate in the care of your patient.         Sincerely,        EDMOND Velez CNP

## 2018-04-24 RX ORDER — INSULIN LISPRO 100 [IU]/ML
INJECTION, SOLUTION INTRAVENOUS; SUBCUTANEOUS
Qty: 15 ML | Refills: 3 | Status: SHIPPED | OUTPATIENT
Start: 2018-04-24 | End: 2018-07-25

## 2018-04-24 NOTE — PROGRESS NOTES
Lala  Here's a copy of your recent A1c result for your records.  I'm sure your A1c will improve with the increased insulin dose and change to Tresiba.  We'll check the A1c again in another 3 months.  Remember to check in with your Sandi results in another 2-4 weeks.  Coral can download it for Bessie or I to review.  Idalia Boudreaux NP  Endocrinology

## 2018-04-30 ENCOUNTER — DOCUMENTATION ONLY (OUTPATIENT)
Dept: SLEEP MEDICINE | Facility: CLINIC | Age: 48
End: 2018-04-30

## 2018-04-30 NOTE — PROGRESS NOTES
30 DAY RUST VISIT    Diagnostic AHI:   67.5     Message left for patient to return call     Assessment: Pt not meeting objective benchmarks for leak and compliance     Action plan: waiting for patient to return call.   Patient has a follow up visit with Dr. De Jesus on 5/14/2018.   Device type: Auto-CPAP  PAP settings: CPAP min 8 cm  H20     CPAP max 12 cm  H20    95th% pressure 11.5 cm  H20   Objective measures: 14 day rolling measures      Compliance  64 %      Leak  46.29 lpm  last  upload      AHI 2.23   last  upload      Average number of minutes 276      Objective measure goal  Compliance   Goal >70%  Leak   Goal < 24 lpm  AHI  Goal < 5  Usage  Goal >240

## 2018-05-07 ENCOUNTER — OFFICE VISIT (OUTPATIENT)
Dept: OPTOMETRY | Facility: CLINIC | Age: 48
End: 2018-05-07
Payer: COMMERCIAL

## 2018-05-07 DIAGNOSIS — H52.6: ICD-10-CM

## 2018-05-07 DIAGNOSIS — H53.8 BLURRED VISION: Primary | ICD-10-CM

## 2018-05-07 DIAGNOSIS — E11.9 DIABETES MELLITUS WITHOUT OPHTHALMIC MANIFESTATIONS (H): ICD-10-CM

## 2018-05-07 PROCEDURE — 92015 DETERMINE REFRACTIVE STATE: CPT | Performed by: OPTOMETRIST

## 2018-05-07 PROCEDURE — 92012 INTRM OPH EXAM EST PATIENT: CPT | Performed by: OPTOMETRIST

## 2018-05-07 ASSESSMENT — SLIT LAMP EXAM - LIDS
COMMENTS: NORMAL
COMMENTS: NORMAL

## 2018-05-07 ASSESSMENT — REFRACTION_MANIFEST
OD_CYLINDER: +1.00
OS_ADD: +2.50
OD_ADD: +2.50
OD_SPHERE: -4.75
OS_AXIS: 154
OS_CYLINDER: +0.25
OS_SPHERE: -5.25
OD_AXIS: 014

## 2018-05-07 ASSESSMENT — REFRACTION_CURRENTRX
OD_SPHERE: -4.00
OS_SPHERE: -4.75
OD_BRAND: ALCON AIR OPTIX AQUA BC 8.6, D 14.2
OS_BRAND: ALCON AIR OPTIX AQUA BC 8.6, D 14.2

## 2018-05-07 ASSESSMENT — VISUAL ACUITY
OD_CC+: -1
CORRECTION_TYPE: GLASSES
METHOD: SNELLEN - LINEAR
OD_CC: 20/25
OS_CC: 20/20
OS_CC+: -1

## 2018-05-07 ASSESSMENT — CUP TO DISC RATIO
OS_RATIO: 0.55
OD_RATIO: 0.5

## 2018-05-07 ASSESSMENT — REFRACTION_WEARINGRX
OS_SPHERE: -5.25
OS_AXIS: 154
OD_AXIS: 014
OS_CYLINDER: +0.25
OD_CYLINDER: +1.00
OD_SPHERE: -4.75

## 2018-05-07 ASSESSMENT — EXTERNAL EXAM - RIGHT EYE: OD_EXAM: NORMAL

## 2018-05-07 ASSESSMENT — EXTERNAL EXAM - LEFT EYE: OS_EXAM: NORMAL

## 2018-05-07 NOTE — MR AVS SNAPSHOT
After Visit Summary   5/7/2018    Lala George    MRN: [id number]           Patient Information     Date Of Birth          1970        Visit Information        Provider Department      5/7/2018 2:20 PM Marianna Magallanes OD [facility]        Today's Diagnoses     Blurred vision    -  1    Transient refractive change        Diabetes mellitus without ophthalmic manifestations (H)          Care Instructions    Diabetes weakens the blood vessels all over the body, including the eyes. Damage to the blood vessels in the eyes can cause swelling or bleeding into part of the eye (called the retina). This is called diabetic retinopathy (ANUJA-tin-AH-puh-thee). If not treated, this disease can cause vision loss or blindness.   Symptoms may include blurred or distorted vision, but many people have no symptoms. It's important to see your eye doctor regularly to check for problems.   Early treatment and good control can help protect your vision. Here are the things you can do to help prevent vision loss:      1. Keep your blood sugar levels under tight control.      2. Bring high blood pressure under control.      3. No smoking.      4. Have yearly dilated eye exams.           Follow-ups after your visit        Your next 10 appointments already scheduled     May 14, 2018  5:30 PM CDT   Return Sleep Patient with Mir De Jesus MD   [facility] ([facility])    [address] [address] [address] [phone]            Jul 25, 2018  4:00 PM CDT   LAB with CR LAB   [facility]    [address] [place] [address] [phone]           Please do not eat 10-12 hours before your appointment if you are coming in fasting for labs on lipids, cholesterol, or glucose (sugar). This does not apply to pregnant women. Water, hot tea and black coffee (with nothing  added) are okay. Do not drink other fluids, diet soda or chew gum.            Jul 25, 2018  4:30 PM CDT   Return Visit with EDMOND Velez CNP   [facility] ([facility])    [address]  [facility] [address] [phone]              Who to contact     If you have questions or need follow up information about today's clinic visit or your schedule please contact [facility] directly at [phone].  Normal or non-critical lab and imaging results will be communicated to you by Diomicshart, letter or phone within 4 business days after the clinic has received the results. If you do not hear from us within 7 days, please contact the clinic through Artklikkt or phone. If you have a critical or abnormal lab result, we will notify you by phone as soon as possible.  Submit refill requests through Disruptor Beam or call your pharmacy and they will forward the refill request to us. Please allow 3 business days for your refill to be completed.          Additional Information About Your Visit        Disruptor Beam Information     Dis[employer]or Beam gives you secure access to your electronic health record. If you see a primary care provider, you can also send messages to your care team and make appointments. If you have questions, please call your primary care clinic.  If you do not have a primary care provider, please call [phone] and they will assist you.        Care EveryWhere ID     This is your Care EveryWhere ID. This could be used by other organizations to access your [place] medical records  [id number]        Your Vitals Were     Last Period                   01/18/2013            Blood Pressure from Last 3 Encounters:   04/23/18 124/80   03/20/18 134/86   03/06/18 138/90    Weight from Last 3 Encounters:   04/23/18 100.1 kg (220 lb 9.6 oz)   03/20/18 102.1 kg (225 lb)   03/06/18 102.1 kg (225 lb)              We Performed the Following     EYE EXAM (SIMPLE-NONBILLABLE)      [facility]        Primary Care Provider Office Phone # Fax #    Heidy Jama [phone] [address] [place] [address]        Equal Access to Services     NEILASHER MITCHELL : Justen holland das best Caro, wakenyonda luqadaha, qaybta kaalmada sailaja, eulalia avila laulineri jose raul. So [facility] [phone].    ATENCIÓN: Si habla español, tiene a stallworth disposición servicios gratuitos de asistencia lingüística. Llame al [phone].    We comply with applicable federal civil rights laws and [place] laws. We do not discriminate on the basis of race, color, national origin, age, disability, sex, sexual orientation, or gender identity.            Thank you!     Thank you for choosing [facility]  for your care. Our goal is always to provide you with excellent care. Hearing back from our patients is one way we can continue to improve our services. Please take a few minutes to complete the written survey that you may receive in the mail after your visit with us. Thank you!             Your Updated Medication List - Protect others around you: Learn how to safely use, store and throw away your medicines at www.disposemymeds.org.          This list is accurate as of 5/7/18  3:49 PM.  Always use your most recent med list.                   Brand Name Dispense Instructions for use Diagnosis    albiglutide 50 MG pen    TANZEUM    12 each    Inject 50 mg Subcutaneous once a week    Uncontrolled type 2 diabetes mellitus without complication, with long-term current use of insulin (H)       albuterol 108 (90 Base) MCG/ACT Inhaler    VENTOLIN HFA    1 Inhaler    Inhale 1-2 puffs into the lungs every 4 hours as needed    Routine general medical examination at a health care facility       atorvastatin 10 MG tablet    LIPITOR    30 tablet    Take 1 tablet (10 mg) by mouth daily    Uncontrolled type 2 diabetes mellitus without complication, with long-term current use of insulin (H),  Hyperlipidemia LDL goal <100       beclomethasone 40 MCG/ACT Inhaler    QVAR    1 Inhaler    Inhale 2 puffs into the lungs 2 times daily    Mild persistent asthma without complication       * blood glucose monitoring meter device kit    no brand specified    1 kit    Use to test blood sugar 2 times daily or as directed. One touch or as covered by insurance.    Type 2 diabetes mellitus with stage 1 chronic kidney disease, without long-term current use of insulin (H)       * blood glucose monitoring meter device kit     1 kit    Check blood sugar two times daily or as directed    Uncontrolled type 2 diabetes mellitus without complication, with long-term current use of insulin (H)       blood glucose monitoring test strip    no brand specified    200 strip    Use to test blood sugars 2 times daily or as directed. One touch or as covered by insurance    Type 2 diabetes mellitus with stage 1 chronic kidney disease, without long-term current use of insulin (H)       continuous blood glucose monitoring sensor     3 each    For use with Freestyle Sandi Flash  for continuous monitioring of blood glucose levels. Replace sensor every 10 days.    Uncontrolled type 2 diabetes mellitus without complication, with long-term current use of insulin (H)       FREESTYLE SANDI READER Meagan     1 Device    1 kit as needed    Uncontrolled type 2 diabetes mellitus without complication, with long-term current use of insulin (H)       HumaLOG KWIKpen 100 UNIT/ML injection   Generic drug:  insulin lispro     15 mL    15 units tid + 1:30>150 correction.  Total daily dose 50 units    Uncontrolled type 2 diabetes mellitus without complication, with long-term current use of insulin (H)       hydrochlorothiazide 25 MG tablet    HYDRODIURIL    30 tablet    Take 1 tablet (25 mg) by mouth every morning    Essential hypertension, benign, Uncontrolled type 2 diabetes mellitus without complication, with long-term current use of insulin (H)        insulin degludec 200 UNIT/ML pen    TRESIBA    18 mL    Inject 50 Units Subcutaneous daily    Uncontrolled type 2 diabetes mellitus without complication, with long-term current use of insulin (H)       losartan 100 MG tablet    COZAAR    30 tablet    Take 1 tablet (100 mg) by mouth daily    Uncontrolled type 2 diabetes mellitus without complication, with long-term current use of insulin (H), Essential hypertension, benign       metFORMIN 500 MG 24 hr tablet    GLUCOPHAGE-XR    120 tablet    Take 4 tablets (2,000 mg) by mouth daily (with dinner)    Uncontrolled type 2 diabetes mellitus without complication, with long-term current use of insulin (H)       sertraline 50 MG tablet    ZOLOFT    30 tablet    Take 1 tablet (50 mg) by mouth daily    Adjustment disorder with mixed anxiety and depressed mood       ULTICARE MINI 31G X 6 MM   Generic drug:  insulin pen needle     600 each    Use 10 pen needles daily or as directed.    Type 2 diabetes mellitus, uncontrolled (H)       * Notice:  This list has 2 medication(s) that are the same as other medications prescribed for you. Read the directions carefully, and ask your doctor or other care provider to review them with you.

## 2018-05-07 NOTE — PATIENT INSTRUCTIONS
Diabetes weakens the blood vessels all over the body, including the eyes. Damage to the blood vessels in the eyes can cause swelling or bleeding into part of the eye (called the retina). This is called diabetic retinopathy (ANUJA-tin-AH-pu-thee). If not treated, this disease can cause vision loss or blindness.   Symptoms may include blurred or distorted vision, but many people have no symptoms. It's important to see your eye doctor regularly to check for problems.   Early treatment and good control can help protect your vision. Here are the things you can do to help prevent vision loss:      1. Keep your blood sugar levels under tight control.      2. Bring high blood pressure under control.      3. No smoking.      4. Have yearly dilated eye exams.

## 2018-05-07 NOTE — PROGRESS NOTES
Chief Complaint   Patient presents with     Rx Recheck     Diabetic      Glucose was too high when she was in for   Her exam, now better controlled and vision is worse with new glasses   ( purchased elsewhere)  Contact fitting was also done  Last Eye Exam: March  What are you currently using to see?  Glasses, wearing old contacts since they are clearer     Distance Vision Acuity: not as clear    Near Vision Acuity: near is especially worse           Medical, surgical and family histories reviewed and updated 5/7/2018.       OBJECTIVE: See Ophthalmology exam    ASSESSMENT:    ICD-10-CM    1. Blurred vision H53.8    2. Transient refractive change H52.6    3. Diabetes mellitus without ophthalmic manifestations (H) E11.9       Blurred vision secondary to uncontrolled diabetes, no retinopathy   Transient refractive change      PLAN:     Update prescription   For glasses and contacts     Marianna Magallanes OD

## 2018-07-25 ENCOUNTER — OFFICE VISIT (OUTPATIENT)
Dept: ENDOCRINOLOGY | Facility: CLINIC | Age: 48
End: 2018-07-25
Payer: COMMERCIAL

## 2018-07-25 VITALS
SYSTOLIC BLOOD PRESSURE: 112 MMHG | HEART RATE: 76 BPM | DIASTOLIC BLOOD PRESSURE: 80 MMHG | WEIGHT: 221.9 LBS | RESPIRATION RATE: 16 BRPM | BODY MASS INDEX: 36.93 KG/M2

## 2018-07-25 LAB — HBA1C MFR BLD: 11.4 % (ref 0–5.6)

## 2018-07-25 PROCEDURE — 36415 COLL VENOUS BLD VENIPUNCTURE: CPT | Performed by: INTERNAL MEDICINE

## 2018-07-25 PROCEDURE — 83036 HEMOGLOBIN GLYCOSYLATED A1C: CPT | Performed by: INTERNAL MEDICINE

## 2018-07-25 PROCEDURE — 99214 OFFICE O/P EST MOD 30 MIN: CPT | Performed by: CLINICAL NURSE SPECIALIST

## 2018-07-25 NOTE — PATIENT INSTRUCTIONS
Component      Latest Ref Rng & Units 1/24/2018 4/23/2018 7/25/2018   Hemoglobin A1C      0 - 5.6 % 10.1 (H) 11.9 (H) 11.4 (H)    Your A1c goal is less than 7.0%.    Increase Tresiba to 60 units once daily.  Increase Novolog to 18 units before each meal.  You can increase this further to 20 units before each meal if your blood sugars are still above 140    Plus add additional 1 unit per 25 points of blood sugar above 150    Blood sugar:   - no additional units  151-175 - +1  176-200 - +2  201-225 - +3  226-250 - +4  251-275 - +5  276-300 - +6  301-325 - +7  326-350 - +8  351-375 - +9  376-400 - +10  Over 400 - +11    Follow up with me in 3 months.  Please message me if your blood sugars remain elevated above 160 average with the above changes or you have more than 3 low blood sugars in a one week period.    Idalia Boudreaux NP  Endocrinology

## 2018-07-25 NOTE — LETTER
2018         RE: Lala George  [address]  [facility] [id number]        Dear Colleague,    Thank you for referring your patient, Lala George, to the [facility]. Please see a copy of my visit note below.    Name: Lala George  Seen at the request of Heidy Jama for Diabetes (2018).  HPI:  Lala George is a 48 year old female who presents for the management of:    1. Type 2 DM:  Originally diagnosed: 2014.  Glucose slightly elevated 109-113 the year prior to diagnosis.  Glucose at the time of diagnosis was 317.    Current Regimen: Metformin XR 2000 mg/day, Tresiba 50 units q hs, Humalog 15 units tid +1:30>150.    Initially treated with Metformin, Lantus insulin was added a short time after diagnosis. Lantus was later changed to Basaglar due to insurance, then changed to Tresiba.  Humalog then added.  Was briefly treated with glipizide in , for about 2-3 months, discontinued due to ineffectiveness  Trulicity was added 2017, this was later changed to Tanzeum due to insurance formulary. Tanzeum no longer available.    Ran out of Tresiba 2 weeks ago.    BS checks: Continuous, Sandi personal CGM  Sandi:  Average B.  100% above target range.       Works night shift 10 pm to 6 am, Tuesday - .    Complications:   Diabetes Complications  Description / Detail    Diabetic Retinopathy  No retinopathy, last eye exam ?    CAD / PAD  No   Neuropathy  No   Nephropathy / Microalbuminuria  Elevated urine microalbumin x 1   Gastroparesis  No   Hypoglycemia Unawareness  No     2. Hypertension: Blood Pressure today:   BP Readings from Last 3 Encounters:   18 112/80   18 124/80   18 134/86   .  Blood pressure medications include losartan 100 mg qd, HCTZ 25 mg qd.  Takes medications everyday without forgetting a dose.  Denies feeling lightheaded or dizzy.    3. Hyperlipidemia: Takes atorvastatin 10 mg qd for lipid control.  Denies muscle  aches of pains.       4. Prevention:  Flu Shot- 9/2017  Pneumovax- 3/2/2015  Opthalmology-Yes: overdue, referral provided.  Dental-recommend semiannually  ASA-No  Smoking- No  PMH/PSH:  Past Medical History:   Diagnosis Date     BENIGN HYPERTENSION 2/15/2006     Mild persistent asthma      Past Surgical History:   Procedure Laterality Date     NO HISTORY OF SURGERY       Family Hx:  Family History   Problem Relation Age of Onset     Breast Cancer Mother 30     first diagnosed in her early 30's     Hypertension Mother      Diabetes Mother      C.A.D. Mother      CHF 50s ,pacemaker late 50s, CABG 60s     C.A.D. Maternal Aunt      60s     Glaucoma No family hx of      Macular Degeneration No family hx of      Thyroid disease: No         DM2: Yes: mother and one sibling         Autoimmune: DM1, SLE, RA, Vitiligo No    Social Hx:  Social History     Social History     Marital status: Single     Spouse name: N/A     Number of children: N/A     Years of education: N/A     Occupational History      Other     part time, [employer]     Social History Main Topics     Smoking status: Never Smoker     Smokeless tobacco: Never Used     Alcohol use Yes      Comment: rare--one to two per week     Drug use: No     Sexual activity: No     Other Topics Concern     Not on file     Social History Narrative          MEDICATIONS:  has a current medication list which includes the following prescription(s): albiglutide, albuterol, atorvastatin, beclomethasone, blood glucose monitoring, blood glucose monitoring, blood glucose monitoring, freestyle estiven reader, continuous blood glucose monitoring, humalog kwikpen, hydrochlorothiazide, insulin degludec, losartan, metformin, sertraline, and ulticare mini.    ROS     ROS: 10 point ROS neg other than the symptoms noted above in the HPI.    Physical Exam   VS: /80 (BP Location: Left arm, Patient Position: Chair, Cuff Size: Adult Large)  Pulse 76  Resp 16  Wt 100.7 kg (221 lb 14.4 oz)  LMP  01/18/2013  Breastfeeding? No  BMI 36.93 kg/m2  GENERAL: AXOX3, NAD, well dressed, answering questions appropriately, appears stated age.  HEENT:  no exophthalmos, no proptosis, EOMI, no lig lag, no retraction  CV: RRR, no rubs, gallops, no murmurs  LUNGS: CTAB, no wheezes, rales, or rhonchi  ABDOMEN: soft, nontender, nondistended  EXTREMITIES: no edema, +pulses, no rashes, no lesions  NEUROLOGY: CN grossly intact, no tremors  MSK: grossly intact  SKIN: no rashes, no lesions    LABS:  A1c:   Component      Latest Ref Rng & Units 1/24/2018 4/23/2018 7/25/2018   Hemoglobin A1C      0 - 5.6 % 10.1 (H) 11.9 (H) 11.4 (H)     Basic Metabolic Panel:  !COMPREHENSIVE Latest Ref Rng & Units 1/24/2018   SODIUM 133 - 144 mmol/L 136   POTASSIUM 3.4 - 5.3 mmol/L 4.1   CHLORIDE 94 - 109 mmol/L 101   BUN 7 - 30 mg/dL 18   Creatinine 0.52 - 1.04 mg/dL 0.73   Glucose 70 - 99 mg/dL 344 (H)   ANION GAP 3 - 14 mmol/L 6   CALCIUM 8.5 - 10.1 mg/dL 9.0   ALBUMIN 3.4 - 5.0 g/dL 3.9   PROTEIN, TOTAL 6.8 - 8.8 g/dL 7.1     LFTS/Cholesterol Panel:  !LIPID/HEPATIC Latest Ref Rng & Units 2/21/2018   CHOLESTEROL <200 mg/dL 174   TRIGLYCERIDES <150 mg/dL 138   HDL CHOLESTEROL >49 mg/dL 62   LDL CHOLESTEROL, CALCULATED <100 mg/dL 84   VLDL-CHOLESTEROL 0 - 30 mg/dL    NON HDL CHOLESTEROL <130 mg/dL 112     !LIPID/HEPATIC Latest Ref Rng & Units 12/31/2014 4/1/2015 5/18/2016   AST 0 - 45 U/L 96 (H) 67 (H) 124 (H)   ALT 0 - 50 U/L 165 (H) 120 (H) 152 (H)     !LIPID/HEPATIC Latest Ref Rng & Units 1/4/2017 4/10/2017 1/24/2018   AST 0 - 45 U/L 68 (H) 35 28   ALT 0 - 50 U/L 104 (H) 70 (H) 37     Thyroid Function:   !THYROID Latest Ref Rng & Units 1/24/2018 7/10/2017 5/18/2016   TSH 0.40 - 4.00 mU/L 2.40 2.88 3.09     Urine MicroAlbumin:  Component    Latest Ref Rng & Units 1/21/2015 5/18/2016 1/24/2018   Creatinine Urine    mg/dL 100 73 41   Albumin Urine mg/L    mg/L 22 35 6   Albumin Urine mg/g Cr    0 - 25 mg/g Cr 22.11 47.94 (H) 14.99     Vitamin  D:    All pertinent notes, labs, and images personally reviewed by me.     A/P  Ms.Shari George is a 48 year old here for the evaluation/management of diabetes:    1. DM2 - Uncontrolled, A1c slightly better.  Increase Tresiba to 60 units qd.  Increase Novolog to 18 units tid + 1:30>150.  Increase further to 20 units tid if needed.  Follow up 3 months, advised to message me if blood sugars are still averaging 160 or higher after the above changes.   There is some variability among people, most will usually develop symptoms suggestive of hypoglycemia when blood glucose levels are lowered to the mid 60's. The first set of symptoms are called adrenergic. Patients may experience any of the following nervousness, sweating, intense hunger, trembling, weakness, palpitations, and difficulty speaking.   The acute management of hypoglycemia involves the rapid delivery of a source of easily absorbed sugar. Regular soda, juice, lifesavers, table sugar, are good options. 15 grams of glucose is the dose that is given, followed by an assessment of symptoms and a blood glucose check if possible. If after 10 minutes there is no improvement, another 10-15 grams should be given. This can be repeated up to three times. The equivalency of 10-15 grams of glucose (approximate servings) are: 3-5 hard candies, 3 teaspoons of sugar, or 1/2 cup of regular soda or juice.    2. Hypertension - Controlled.    3. Hyperlipidemia - On statin therapy.    Labs ordered today:   Orders Placed This Encounter   Procedures     Hemoglobin A1c       Radiology/Consults ordered today: None    All questions were answered.  The patient indicates understanding of the above issues and agrees with the plan set forth.  Total face to face time greater than or equal to 25 minutes.       Follow-up:  3 months in clinic with kobe Boudreaux NP  Endocrinology  [facility]  CC:     Again, thank you for allowing me to participate in the care of your patient.         Sincerely,        EDMOND Velez CNP

## 2018-07-25 NOTE — PROGRESS NOTES
Name: Lala George  Seen at the request of Heidy Jama for Diabetes (2018).  HPI:  Lala George is a 48 year old female who presents for the management of:    1. Type 2 DM:  Originally diagnosed: 2014.  Glucose slightly elevated 109-113 the year prior to diagnosis.  Glucose at the time of diagnosis was 317.    Current Regimen: Metformin XR 2000 mg/day, Tresiba 50 units q hs, Humalog 15 units tid +1:30>150.    Initially treated with Metformin, Lantus insulin was added a short time after diagnosis. Lantus was later changed to Basaglar due to insurance, then changed to Tresiba.  Humalog then added.  Was briefly treated with glipizide in , for about 2-3 months, discontinued due to ineffectiveness  Trulicity was added 2017, this was later changed to Tanzeum due to insurance formulary. Tanzeum no longer available.    Ran out of Tresiba 2 weeks ago.    BS checks: Continuous, Sandi personal CGM  Sandi:  Average B.  100% above target range.       Works night shift 10 pm to 6 am, Tuesday - .    Complications:   Diabetes Complications  Description / Detail    Diabetic Retinopathy  No retinopathy, last eye exam ?    CAD / PAD  No   Neuropathy  No   Nephropathy / Microalbuminuria  Elevated urine microalbumin x 1   Gastroparesis  No   Hypoglycemia Unawareness  No     2. Hypertension: Blood Pressure today:   BP Readings from Last 3 Encounters:   18 112/80   18 124/80   18 134/86   .  Blood pressure medications include losartan 100 mg qd, HCTZ 25 mg qd.  Takes medications everyday without forgetting a dose.  Denies feeling lightheaded or dizzy.    3. Hyperlipidemia: Takes atorvastatin 10 mg qd for lipid control.  Denies muscle aches of pains.       4. Prevention:  Flu Shot- 2017  Pneumovax- 3/2/2015  Opthalmology-Yes: overdue, referral provided.  Dental-recommend semiannually  ASA-No  Smoking- No  PMH/PSH:  Past Medical History:   Diagnosis Date     BENIGN HYPERTENSION  2/15/2006     Mild persistent asthma      Past Surgical History:   Procedure Laterality Date     NO HISTORY OF SURGERY       Family Hx:  Family History   Problem Relation Age of Onset     Breast Cancer Mother 30     first diagnosed in her early 30's     Hypertension Mother      Diabetes Mother      C.AANGEL. Mother      CHF 50s ,pacemaker late 50s, CABG 60s     C.AANGEL. Maternal Aunt      60s     Glaucoma No family hx of      Macular Degeneration No family hx of      Thyroid disease: No         DM2: Yes: mother and one sibling         Autoimmune: DM1, SLE, RA, Vitiligo No    Social Hx:  Social History     Social History     Marital status: Single     Spouse name: N/A     Number of children: N/A     Years of education: N/A     Occupational History      Other     part time, [employer]     Social History Main Topics     Smoking status: Never Smoker     Smokeless tobacco: Never Used     Alcohol use Yes      Comment: rare--one to two per week     Drug use: No     Sexual activity: No     Other Topics Concern     Not on file     Social History Narrative          MEDICATIONS:  has a current medication list which includes the following prescription(s): albiglutide, albuterol, atorvastatin, beclomethasone, blood glucose monitoring, blood glucose monitoring, blood glucose monitoring, freestyle estiven reader, continuous blood glucose monitoring, humalog kwikpen, hydrochlorothiazide, insulin degludec, losartan, metformin, sertraline, and ulticare mini.    ROS     ROS: 10 point ROS neg other than the symptoms noted above in the HPI.    Physical Exam   VS: /80 (BP Location: Left arm, Patient Position: Chair, Cuff Size: Adult Large)  Pulse 76  Resp 16  Wt 100.7 kg (221 lb 14.4 oz)  LMP 01/18/2013  Breastfeeding? No  BMI 36.93 kg/m2  GENERAL: AXOX3, NAD, well dressed, answering questions appropriately, appears stated age.  HEENT:  no exophthalmos, no proptosis, EOMI, no lig lag, no retraction  CV: RRR, no rubs, gallops, no  murmurs  LUNGS: CTAB, no wheezes, rales, or rhonchi  ABDOMEN: soft, nontender, nondistended  EXTREMITIES: no edema, +pulses, no rashes, no lesions  NEUROLOGY: CN grossly intact, no tremors  MSK: grossly intact  SKIN: no rashes, no lesions    LABS:  A1c:   Component      Latest Ref Rng & Units 1/24/2018 4/23/2018 7/25/2018   Hemoglobin A1C      0 - 5.6 % 10.1 (H) 11.9 (H) 11.4 (H)     Basic Metabolic Panel:  !COMPREHENSIVE Latest Ref Rng & Units 1/24/2018   SODIUM 133 - 144 mmol/L 136   POTASSIUM 3.4 - 5.3 mmol/L 4.1   CHLORIDE 94 - 109 mmol/L 101   BUN 7 - 30 mg/dL 18   Creatinine 0.52 - 1.04 mg/dL 0.73   Glucose 70 - 99 mg/dL 344 (H)   ANION GAP 3 - 14 mmol/L 6   CALCIUM 8.5 - 10.1 mg/dL 9.0   ALBUMIN 3.4 - 5.0 g/dL 3.9   PROTEIN, TOTAL 6.8 - 8.8 g/dL 7.1     LFTS/Cholesterol Panel:  !LIPID/HEPATIC Latest Ref Rng & Units 2/21/2018   CHOLESTEROL <200 mg/dL 174   TRIGLYCERIDES <150 mg/dL 138   HDL CHOLESTEROL >49 mg/dL 62   LDL CHOLESTEROL, CALCULATED <100 mg/dL 84   VLDL-CHOLESTEROL 0 - 30 mg/dL    NON HDL CHOLESTEROL <130 mg/dL 112     !LIPID/HEPATIC Latest Ref Rng & Units 12/31/2014 4/1/2015 5/18/2016   AST 0 - 45 U/L 96 (H) 67 (H) 124 (H)   ALT 0 - 50 U/L 165 (H) 120 (H) 152 (H)     !LIPID/HEPATIC Latest Ref Rng & Units 1/4/2017 4/10/2017 1/24/2018   AST 0 - 45 U/L 68 (H) 35 28   ALT 0 - 50 U/L 104 (H) 70 (H) 37     Thyroid Function:   !THYROID Latest Ref Rng & Units 1/24/2018 7/10/2017 5/18/2016   TSH 0.40 - 4.00 mU/L 2.40 2.88 3.09     Urine MicroAlbumin:  Component    Latest Ref Rng & Units 1/21/2015 5/18/2016 1/24/2018   Creatinine Urine    mg/dL 100 73 41   Albumin Urine mg/L    mg/L 22 35 6   Albumin Urine mg/g Cr    0 - 25 mg/g Cr 22.11 47.94 (H) 14.99     Vitamin D:    All pertinent notes, labs, and images personally reviewed by me.     A/P  Ms.Shari George is a 48 year old here for the evaluation/management of diabetes:    1. DM2 - Uncontrolled, A1c slightly better.  Increase Tresiba to 60 units  qd.  Increase Novolog to 18 units tid + 1:30>150.  Increase further to 20 units tid if needed.  Follow up 3 months, advised to message me if blood sugars are still averaging 160 or higher after the above changes.   There is some variability among people, most will usually develop symptoms suggestive of hypoglycemia when blood glucose levels are lowered to the mid 60's. The first set of symptoms are called adrenergic. Patients may experience any of the following nervousness, sweating, intense hunger, trembling, weakness, palpitations, and difficulty speaking.   The acute management of hypoglycemia involves the rapid delivery of a source of easily absorbed sugar. Regular soda, juice, lifesavers, table sugar, are good options. 15 grams of glucose is the dose that is given, followed by an assessment of symptoms and a blood glucose check if possible. If after 10 minutes there is no improvement, another 10-15 grams should be given. This can be repeated up to three times. The equivalency of 10-15 grams of glucose (approximate servings) are: 3-5 hard candies, 3 teaspoons of sugar, or 1/2 cup of regular soda or juice.    2. Hypertension - Controlled.    3. Hyperlipidemia - On statin therapy.    Labs ordered today:   Orders Placed This Encounter   Procedures     Hemoglobin A1c       Radiology/Consults ordered today: None    All questions were answered.  The patient indicates understanding of the above issues and agrees with the plan set forth.  Total face to face time greater than or equal to 25 minutes.       Follow-up:  3 months in clinic with kobe Boudreaux NP  Endocrinology  [facility]  CC:

## 2018-07-25 NOTE — MR AVS SNAPSHOT
After Visit Summary   7/25/2018    Lala George    MRN: [id number]           Patient Information     Date Of Birth          1970        Visit Information        Provider Department      7/25/2018 4:30 PM Idalia Boudreaux APRN [facility]        Today's Diagnoses     Uncontrolled type 2 diabetes mellitus without complication, with long-term current use of insulin (H)    -  1      Care Instructions    Component      Latest Ref Rng & Units 1/24/2018 4/23/2018 7/25/2018   Hemoglobin A1C      0 - 5.6 % 10.1 (H) 11.9 (H) 11.4 (H)    Your A1c goal is less than 7.0%.    Increase Tresiba to 60 units once daily.  Increase Novolog to 18 units before each meal.  You can increase this further to 20 units before each meal if your blood sugars are still above 140    Plus add additional 1 unit per 25 points of blood sugar above 150    Blood sugar:   - no additional units  151-175 - +1  176-200 - +2  201-225 - +3  226-250 - +4  251-275 - +5  276-300 - +6  301-325 - +7  326-350 - +8  351-375 - +9  376-400 - +10  Over 400 - +11    Follow up with me in 3 months.  Please message me if your blood sugars remain elevated above 160 average with the above changes or you have more than 3 low blood sugars in a one week period.    Idalia Boudreaux NP  Endocrinology            Follow-ups after your visit        Who to contact     If you have questions or need follow up information about today's clinic visit or your schedule please contact [facility] directly at [phone].  Normal or non-critical lab and imaging results will be communicated to you by MyChart, letter or phone within 4 business days after the clinic has received the results. If you do not hear from us within 7 days, please contact the clinic through MyChart or phone. If you have a critical or abnormal lab result, we will notify you by phone as soon as possible.  Submit refill requests through [employer] or call your  pharmacy and they will forward the refill request to us. Please allow 3 business days for your refill to be completed.          Additional Information About Your Visit        [employer] Information     [employer] gives you secure access to your electronic health record. If you see a primary care provider, you can also send messages to your care team and make appointments. If you have questions, please call your primary care clinic.  If you do not have a primary care provider, please call [phone] and they will assist you.        Care EveryWhere ID     This is your Care EveryWhere ID. This could be used by other organizations to access your [facility] medical records  [id number]        Your Vitals Were     Pulse Respirations Last Period Breastfeeding? BMI (Body Mass Index)       76 16 01/18/2013 No 36.93 kg/m2        Blood Pressure from Last 3 Encounters:   07/25/18 112/80   04/23/18 124/80   03/20/18 134/86    Weight from Last 3 Encounters:   07/25/18 100.7 kg (221 lb 14.4 oz)   04/23/18 100.1 kg (220 lb 9.6 oz)   03/20/18 102.1 kg (225 lb)              We Performed the Following     Hemoglobin A1c          Today's Medication Changes          These changes are accurate as of 7/25/18  4:58 PM.  If you have any questions, ask your nurse or doctor.               Start taking these medicines.        Dose/Directions    insulin aspart 100 UNIT/ML injection   Commonly known as:  NovoLOG FLEXPEN   Used for:  Uncontrolled type 2 diabetes mellitus without complication, with long-term current use of insulin (H)   Started by:  Idalia Boudreaux APRN CNP        18-20 units before each meal + 1 unit per 25 points of blood sugar above 150. Total daily dose 80 units per day   Quantity:  75 mL   Refills:  3         These medicines have changed or have updated prescriptions.        Dose/Directions    insulin degludec 200 UNIT/ML pen   Commonly known as:  TRESIBA   This may have changed:  how much to take   Used for:  Uncontrolled  type 2 diabetes mellitus without complication, with long-term current use of insulin (H)   Changed by:  Idalia Boudreaux APRN CNP        Dose:  60 Units   Inject 60 Units Subcutaneous daily   Quantity:  27 mL   Refills:  3       insulin pen needle 32G X 5 MM   This may have changed:    - medication strength  - See the new instructions.   Used for:  Uncontrolled type 2 diabetes mellitus without complication, with long-term current use of insulin (H)   Changed by:  Idalia Boudreaux APRN CNP        Use 4 pen needles daily or as directed.   Quantity:  400 each   Refills:  3         Stop taking these medicines if you haven't already. Please contact your care team if you have questions.     albiglutide 50 MG pen   Commonly known as:  TANZEUM   Stopped by:  Idalia Boudreaux APRN CNP           HumaLOG KWIKpen 100 UNIT/ML injection   Generic drug:  insulin lispro   Stopped by:  Idalia Boudreaux APRN CNP                Where to get your medicines      These medications were sent to [facility] - [address]   [address]  [address], [place] [address]     Phone:  [phone]     insulin degludec 200 UNIT/ML pen         Some of these will need a paper prescription and others can be bought over the counter.  Ask your nurse if you have questions.     Bring a paper prescription for each of these medications     insulin aspart 100 UNIT/ML injection    insulin pen needle 32G X 5 MM                Primary Care Provider Office Phone # Fax #    Heidy Jama [phone] [address] [place] [address]        Equal Access to Services     [facility] AH: Hadii aad ku hadasho Soomaali, waaxda luqadaha, qaybta kaalmada adeegyada, waxay sruthi blunt. So [facility] [phone].    ATENCIÓN: Si habla español, tiene a stallworth disposición servicios gratuitos de asistencia lingüística. Llame al [phone].    We comply with  applicable federal civil rights laws and [place] laws. We do not discriminate on the basis of race, color, national origin, age, disability, sex, sexual orientation, or gender identity.            Thank you!     Thank you for choosing [facility]  for your care. Our goal is always to provide you with excellent care. Hearing back from our patients is one way we can continue to improve our services. Please take a few minutes to complete the written survey that you may receive in the mail after your visit with us. Thank you!             Your Updated Medication List - Protect others around you: Learn how to safely use, store and throw away your medicines at www.disposemymeds.org.          This list is accurate as of 7/25/18  4:58 PM.  Always use your most recent med list.                   Brand Name Dispense Instructions for use Diagnosis    albuterol 108 (90 Base) MCG/ACT Inhaler    VENTOLIN HFA    1 Inhaler    Inhale 1-2 puffs into the lungs every 4 hours as needed    Routine general medical examination at a health care facility       atorvastatin 10 MG tablet    LIPITOR    30 tablet    Take 1 tablet (10 mg) by mouth daily    Uncontrolled type 2 diabetes mellitus without complication, with long-term current use of insulin (H), Hyperlipidemia LDL goal <100       beclomethasone 40 MCG/ACT Inhaler    QVAR    1 Inhaler    Inhale 2 puffs into the lungs 2 times daily    Mild persistent asthma without complication       * blood glucose monitoring meter device kit    no brand specified    1 kit    Use to test blood sugar 2 times daily or as directed. One touch or as covered by insurance.    Type 2 diabetes mellitus with stage 1 chronic kidney disease, without long-term current use of insulin (H)       * blood glucose monitoring meter device kit     1 kit    Check blood sugar two times daily or as directed    Uncontrolled type 2 diabetes mellitus without complication, with long-term current use of insulin  (H)       blood glucose monitoring test strip    no brand specified    200 strip    Use to test blood sugars 2 times daily or as directed. One touch or as covered by insurance    Type 2 diabetes mellitus with stage 1 chronic kidney disease, without long-term current use of insulin (H)       continuous blood glucose monitoring sensor     3 each    For use with Freestyle Sandi Flash  for continuous monitioring of blood glucose levels. Replace sensor every 10 days.    Uncontrolled type 2 diabetes mellitus without complication, with long-term current use of insulin (H)       FREESTYLE SANDI READER Meagan     1 Device    1 kit as needed    Uncontrolled type 2 diabetes mellitus without complication, with long-term current use of insulin (H)       hydrochlorothiazide 25 MG tablet    HYDRODIURIL    30 tablet    Take 1 tablet (25 mg) by mouth every morning    Essential hypertension, benign, Uncontrolled type 2 diabetes mellitus without complication, with long-term current use of insulin (H)       insulin aspart 100 UNIT/ML injection    NovoLOG FLEXPEN    75 mL    18-20 units before each meal + 1 unit per 25 points of blood sugar above 150. Total daily dose 80 units per day    Uncontrolled type 2 diabetes mellitus without complication, with long-term current use of insulin (H)       insulin degludec 200 UNIT/ML pen    TRESIBA    27 mL    Inject 60 Units Subcutaneous daily    Uncontrolled type 2 diabetes mellitus without complication, with long-term current use of insulin (H)       insulin pen needle 32G X 5 MM     400 each    Use 4 pen needles daily or as directed.    Uncontrolled type 2 diabetes mellitus without complication, with long-term current use of insulin (H)       losartan 100 MG tablet    COZAAR    30 tablet    Take 1 tablet (100 mg) by mouth daily    Uncontrolled type 2 diabetes mellitus without complication, with long-term current use of insulin (H), Essential hypertension, benign       metFORMIN 500 MG 24  hr tablet    GLUCOPHAGE-XR    120 tablet    Take 4 tablets (2,000 mg) by mouth daily (with dinner)    Uncontrolled type 2 diabetes mellitus without complication, with long-term current use of insulin (H)       sertraline 50 MG tablet    ZOLOFT    30 tablet    Take 1 tablet (50 mg) by mouth daily    Adjustment disorder with mixed anxiety and depressed mood       * Notice:  This list has 2 medication(s) that are the same as other medications prescribed for you. Read the directions carefully, and ask your doctor or other care provider to review them with you.

## 2018-08-03 DIAGNOSIS — F43.23 ADJUSTMENT DISORDER WITH MIXED ANXIETY AND DEPRESSED MOOD: ICD-10-CM

## 2018-08-03 NOTE — LETTER
August 20, 2018      Lala George  [address]  [place] [address]        Dear Lala,       We care about your health and have reviewed your health plan including your medical conditions, medications, and lab results.  Based on this review, it is recommended that you follow up regarding the following health topic(s):  -Depression    We recommend you take the following action(s):  -schedule a FOLLOWUP APPOINTMENT.     Please call us at the [facility] - [phone] (or use [employer]) to address the above recommendations.     Thank you for trusting [facility] and we appreciate the opportunity to serve you.  We look forward to supporting your healthcare needs in the future.    Healthy Regards,    Your Health Care Team  [facility]

## 2018-08-03 NOTE — TELEPHONE ENCOUNTER
"Requested Prescriptions   Pending Prescriptions Disp Refills     sertraline (ZOLOFT) 50 MG tablet [Pharmacy Med Name: SERTRALINE HCL 50MG TABS]  Last Written Prescription Date:  01/24/2018  Last Fill Quantity: 30 tablet,  # refills: 5   Last office visit: 2/21/2018 with prescribing provider:  Hiedy Jama MD    Future Office Visit:     30 tablet 5     Sig: TAKE ONE TABLET BY MOUTH EVERY DAY    SSRIs Protocol Passed    8/3/2018  7:07 AM   PHQ-9 SCORE 1/11/2017 1/24/2018   Total Score 6 0     JOANNE-7 SCORE 1/24/2018   Total Score 0           Passed - Recent (12 mo) or future (30 days) visit within the authorizing provider's specialty    Patient had office visit in the last 12 months or has a visit in the next 30 days with authorizing provider or within the authorizing provider's specialty.  See \"Patient Info\" tab in inbasket, or \"Choose Columns\" in Meds & Orders section of the refill encounter.           Passed - Patient is age 18 or older       Passed - No active pregnancy on record       Passed - No positive pregnancy test in last 12 months          "

## 2018-08-06 NOTE — TELEPHONE ENCOUNTER
Routing refill request to provider for review/approval because:  Patient needs to be seen because:  Per last appointment encounter patient was to return in 3 months and has not and also overdue for depression follow up.     Laura Finch RN

## 2018-08-07 NOTE — TELEPHONE ENCOUNTER
Please call patient to request appointment.  Will sign refill x 1 month (or until can be seen) once appointment is made.    Cornelio Alfonso MD  (Covering provider)

## 2018-08-07 NOTE — TELEPHONE ENCOUNTER
Called and left message to return call and schedule appointment due. Patient is due for depression follow up with PCP.    Leah Najera MA 12:00 PM 8/7/2018

## 2018-08-13 NOTE — TELEPHONE ENCOUNTER
2nd attempt. Called and left message to return call and schedule appointment due. Patient is due for depression follow up.    Leah Najera MA 10:54 AM 8/13/2018

## 2018-08-20 NOTE — TELEPHONE ENCOUNTER
3rd attempt. Sent letter. Will forward chart to PCP as patient hasn't scheduled yet.     Leah Najera MA 9:11 AM 8/20/2018

## 2018-08-20 NOTE — TELEPHONE ENCOUNTER
Sending 30 day supply for now. Will see if she schedules after notification letter.   Heidy Jama M.D.

## 2018-09-26 ENCOUNTER — DOCUMENTATION ONLY (OUTPATIENT)
Dept: SLEEP MEDICINE | Facility: CLINIC | Age: 48
End: 2018-09-26

## 2018-09-26 NOTE — PROGRESS NOTES
6 month [facility] Recheck Visit     Diagnostic AHI:  67.5 HST    Message left for patient to return call     Assessment: Pt not meeting objective benchmarks for compliance     Action plan: waiting for patient to return call.   pt to follow up per provider request (1-2 yrs)       Device type: Auto-CPAP  PAP settings: CPAP min 8.0 cm  H20     CPAP max 12.0 cm  H20    95th% pressure 11.8 cm  H20   Objective measures: 14 day rolling measures      Compliance  57 %      Leak  23.45 lpm  last  upload      AHI 2.41   last  upload      Average number of minutes 222      Objective measure goal  Compliance   Goal >70%  Leak   Goal < 24 lpm  AHI  Goal < 5  Usage  Goal >240

## 2018-11-16 ENCOUNTER — MYC MEDICAL ADVICE (OUTPATIENT)
Dept: ENDOCRINOLOGY | Facility: CLINIC | Age: 48
End: 2018-11-16

## 2018-11-16 ENCOUNTER — MYC MEDICAL ADVICE (OUTPATIENT)
Dept: PEDIATRICS | Facility: CLINIC | Age: 48
End: 2018-11-16

## 2018-11-16 DIAGNOSIS — L29.9 SCALP ITCH: Primary | ICD-10-CM

## 2018-11-19 NOTE — TELEPHONE ENCOUNTER
Patient asking for a referral to dermatology for red and itchy area of the back of the scalp.  Concerned this may be psoriasis.    Referral pended, please sign if ok.  Sameera Cespedes RN  Message handled by Nurse Triage.

## 2018-11-27 ENCOUNTER — MYC MEDICAL ADVICE (OUTPATIENT)
Dept: PEDIATRICS | Facility: CLINIC | Age: 48
End: 2018-11-27

## 2018-11-27 DIAGNOSIS — L60.0 INGROWN TOENAIL: Primary | ICD-10-CM

## 2018-11-27 NOTE — TELEPHONE ENCOUNTER
Patient asking for a referral to podiatry for ingrown toenails. Pended, please sign if ok.  Sameera Cespedes RN  Message handled by Nurse Triage.

## 2018-12-05 ENCOUNTER — OFFICE VISIT (OUTPATIENT)
Dept: PEDIATRICS | Facility: CLINIC | Age: 48
End: 2018-12-05
Payer: COMMERCIAL

## 2018-12-05 VITALS
SYSTOLIC BLOOD PRESSURE: 110 MMHG | BODY MASS INDEX: 37.4 KG/M2 | WEIGHT: 224.5 LBS | HEART RATE: 94 BPM | TEMPERATURE: 98 F | DIASTOLIC BLOOD PRESSURE: 68 MMHG | OXYGEN SATURATION: 96 % | HEIGHT: 65 IN

## 2018-12-05 DIAGNOSIS — L40.9 PSORIASIS OF SCALP: Primary | ICD-10-CM

## 2018-12-05 DIAGNOSIS — E78.5 HYPERLIPIDEMIA LDL GOAL <100: ICD-10-CM

## 2018-12-05 DIAGNOSIS — F43.23 ADJUSTMENT DISORDER WITH MIXED ANXIETY AND DEPRESSED MOOD: ICD-10-CM

## 2018-12-05 DIAGNOSIS — I10 ESSENTIAL HYPERTENSION, BENIGN: ICD-10-CM

## 2018-12-05 DIAGNOSIS — J45.30 MILD PERSISTENT ASTHMA WITHOUT COMPLICATION: ICD-10-CM

## 2018-12-05 DIAGNOSIS — L40.9 PSORIASIS: ICD-10-CM

## 2018-12-05 LAB — HBA1C MFR BLD: 11.9 % (ref 0–5.6)

## 2018-12-05 PROCEDURE — 36415 COLL VENOUS BLD VENIPUNCTURE: CPT | Performed by: CLINICAL NURSE SPECIALIST

## 2018-12-05 PROCEDURE — 99214 OFFICE O/P EST MOD 30 MIN: CPT | Performed by: INTERNAL MEDICINE

## 2018-12-05 PROCEDURE — 83036 HEMOGLOBIN GLYCOSYLATED A1C: CPT | Performed by: CLINICAL NURSE SPECIALIST

## 2018-12-05 RX ORDER — ATORVASTATIN CALCIUM 10 MG/1
10 TABLET, FILM COATED ORAL DAILY
Qty: 90 TABLET | Refills: 11 | Status: SHIPPED | OUTPATIENT
Start: 2018-12-05 | End: 2019-12-10

## 2018-12-05 RX ORDER — ALBUTEROL SULFATE 90 UG/1
1-2 AEROSOL, METERED RESPIRATORY (INHALATION) EVERY 4 HOURS PRN
Qty: 1 INHALER | Refills: 2 | Status: SHIPPED | OUTPATIENT
Start: 2018-12-05 | End: 2022-08-01

## 2018-12-05 RX ORDER — LOSARTAN POTASSIUM 100 MG/1
100 TABLET ORAL DAILY
Qty: 90 TABLET | Refills: 11 | Status: SHIPPED | OUTPATIENT
Start: 2018-12-05 | End: 2019-12-10

## 2018-12-05 RX ORDER — HYDROCHLOROTHIAZIDE 25 MG/1
25 TABLET ORAL EVERY MORNING
Qty: 90 TABLET | Refills: 11 | Status: SHIPPED | OUTPATIENT
Start: 2018-12-05 | End: 2019-12-10

## 2018-12-05 RX ORDER — METFORMIN HCL 500 MG
2000 TABLET, EXTENDED RELEASE 24 HR ORAL
Qty: 360 TABLET | Refills: 11 | Status: SHIPPED | OUTPATIENT
Start: 2018-12-05 | End: 2018-12-06

## 2018-12-05 RX ORDER — FLUOCINONIDE TOPICAL SOLUTION USP, 0.05% 0.5 MG/ML
SOLUTION TOPICAL
Qty: 60 ML | Refills: 0 | Status: SHIPPED | OUTPATIENT
Start: 2018-12-05 | End: 2019-01-15

## 2018-12-05 RX ORDER — FLUOCINONIDE 0.5 MG/G
OINTMENT TOPICAL 2 TIMES DAILY
Qty: 15 G | Refills: 3 | Status: SHIPPED | OUTPATIENT
Start: 2018-12-05 | End: 2020-05-20

## 2018-12-05 ASSESSMENT — PATIENT HEALTH QUESTIONNAIRE - PHQ9
SUM OF ALL RESPONSES TO PHQ QUESTIONS 1-9: 0
5. POOR APPETITE OR OVEREATING: NOT AT ALL

## 2018-12-05 ASSESSMENT — ANXIETY QUESTIONNAIRES
1. FEELING NERVOUS, ANXIOUS, OR ON EDGE: NOT AT ALL
6. BECOMING EASILY ANNOYED OR IRRITABLE: SEVERAL DAYS
GAD7 TOTAL SCORE: 1
2. NOT BEING ABLE TO STOP OR CONTROL WORRYING: NOT AT ALL
3. WORRYING TOO MUCH ABOUT DIFFERENT THINGS: NOT AT ALL
IF YOU CHECKED OFF ANY PROBLEMS ON THIS QUESTIONNAIRE, HOW DIFFICULT HAVE THESE PROBLEMS MADE IT FOR YOU TO DO YOUR WORK, TAKE CARE OF THINGS AT HOME, OR GET ALONG WITH OTHER PEOPLE: NOT DIFFICULT AT ALL
5. BEING SO RESTLESS THAT IT IS HARD TO SIT STILL: NOT AT ALL
7. FEELING AFRAID AS IF SOMETHING AWFUL MIGHT HAPPEN: NOT AT ALL

## 2018-12-05 NOTE — PATIENT INSTRUCTIONS
Use lidex twice daily, solution on scalp, ointment on elbows.    Work on getting all of your doses of novolog in.

## 2018-12-05 NOTE — MR AVS SNAPSHOT
After Visit Summary   12/5/2018    Lala George    MRN: [id number]           Patient Information     Date Of Birth          1970        Visit Information        Provider Department      12/5/2018 9:10 AM [facility] MD [facility]        Today's Diagnoses     Psoriasis of scalp    -  1    Routine general medical examination at a health care facility        Uncontrolled type 2 diabetes mellitus without complication, with long-term current use of insulin (H)        Hyperlipidemia LDL goal <100        Essential hypertension, benign        Adjustment disorder with mixed anxiety and depressed mood        Mild persistent asthma without complication        Psoriasis          Care Instructions    Use lidex twice daily, solution on scalp, ointment on elbows.    Work on getting all of your doses of novolog in.            Follow-ups after your visit        Additional Services     DERMATOLOGY REFERRAL       Your provider has referred you to: [facility]: [facility] [place] [phone]    Please be aware that coverage of these services is subject to the terms and limitations of your health insurance plan.  Call member services at your health plan with any benefit or coverage questions.      Please bring the following with you to your appointment:    (1) Any X-Rays, CTs or MRIs which have been performed.  Contact the facility where they were done to arrange for  prior to your scheduled appointment.    (2) List of current medications  (3) This referral request   (4) Any documents/labs given to you for this referral                  Follow-up notes from your care team     Return in about 3 months (around 3/5/2019) for Follow Up Visit, Preventive Visit.      Your next 10 appointments already scheduled     Dec 06, 2018  7:00 AM CST   [facility] EDMOND [facility]z C[facility]    [address]  [place]  "[place] [address]   [phone]            Dec 10, 2018  1:30 PM CST   PROCEDURE with Levar Beth DPM   [facility] [place] ([facility])    [address] [place] [address]  [address]  [place] [id number] [phone]            Mar 06, 2019  8:10 AM CST   SHORT with Heidy Jama MD   [facility] [place] ([facility])    [address] [place] [address]  [address]  [place] [address] [phone]              Who to contact     If you have questions or need follow up information about today's clinic visit or your schedule please contact [facility] directly at [phone].  Normal or non-critical lab and imaging results will be communicated to you by Wibiyahart, letter or phone within 4 business days after the clinic has received the results. If you do not hear from us within 7 days, please contact the clinic through Wibiyahart or phone. If you have a critical or abnormal lab result, we will notify you by phone as soon as possible.  Submit refill requests through [employer] or call your pharmacy and they will forward the refill request to us. Please allow 3 business days for your refill to be completed.          Additional Information About Your Visit        [employer] Information     [employer] gives you secure access to your electronic health record. If you see a primary care provider, you can also send messages to your care team and make appointments. If you have questions, please call your primary care clinic.  If you do not have a primary care provider, please call [phone] and they will assist you.        Care EveryWhere ID     This is your Care EveryWhere ID. This could be used by other organizations to access your [place] medical records  [id number]        Your Vitals Were     Pulse Temperature Height Last Period Pulse Oximetry BMI (Body Mass Index)    94 98  F (36.7  C) (Oral) 5' 5\" (1.651 m) 01/18/2013 96% 37.36 kg/m2       Blood Pressure from Last 3 " Encounters:   12/05/18 110/68   07/25/18 112/80   04/23/18 124/80    Weight from Last 3 Encounters:   12/05/18 224 lb 8 oz (101.8 kg)   07/25/18 221 lb 14.4 oz (100.7 kg)   04/23/18 220 lb 9.6 oz (100.1 kg)              We Performed the Following     DERMATOLOGY REFERRAL     Hemoglobin A1c          Today's Medication Changes          These changes are accurate as of 12/5/18 10:40 AM.  If you have any questions, ask your nurse or doctor.               Start taking these medicines.        Dose/Directions    * fluocinonide 0.05 % external solution   Commonly known as:  LIDEX   Used for:  Psoriasis of scalp   Started by:  Heidy Jama MD        Apply sparingly to affected area twice daily as needed.  Do not apply to face.   Quantity:  60 mL   Refills:  0       * fluocinonide 0.05 % external ointment   Commonly known as:  LIDEX   Used for:  Psoriasis   Started by:  Heidy Jama MD        Apply topically 2 times daily   Quantity:  15 g   Refills:  3       * Notice:  This list has 2 medication(s) that are the same as other medications prescribed for you. Read the directions carefully, and ask your doctor or other care provider to review them with you.      These medicines have changed or have updated prescriptions.        Dose/Directions    insulin degludec 200 UNIT/ML pen   Commonly known as:  TRESIBA   Indication:  50 UNITS   This may have changed:  how much to take   Used for:  Uncontrolled type 2 diabetes mellitus without complication, with long-term current use of insulin (H)   Changed by:  Heidy Jama MD        Dose:  50 Units   Inject 50 Units Subcutaneous daily   Quantity:  27 mL   Refills:  11       sertraline 50 MG tablet   Commonly known as:  ZOLOFT   This may have changed:  See the new instructions.   Used for:  Adjustment disorder with mixed anxiety and depressed mood   Changed by:  Heidy Jama MD        Dose:  50 mg   Take 1 tablet (50 mg) by mouth daily   Quantity:  90 tablet    Refills:  6            Where to get your medicines      These medications were sent to [facility] - [address]   [address], [place] [address]     Phone:  [phone]     albuterol 108 (90 Base) MCG/ACT inhaler    atorvastatin 10 MG tablet    fluocinonide 0.05 % external ointment    fluocinonide 0.05 % external solution    hydrochlorothiazide 25 MG tablet    insulin degludec 200 UNIT/ML pen    insulin pen needle 32G X 5 MM    losartan 100 MG tablet    metFORMIN 500 MG 24 hr tablet    sertraline 50 MG tablet                Primary Care Provider Office Phone # Fax #    Heidy Jama [phone]       [address] [place] [address]        Equal Access to Services     ASHER MDERANO : Hadii holland das hadasho Soomaali, waaxda luqadaha, qaybta kaalmada adeegyada, eulalia black . So [facility] [phone].    ATENCIÓN: Si habla español, tiene a stallworth disposición servicios gratuitos de asistencia lingüística. [facility] [phone].    We comply with applicable federal civil rights laws and [place] laws. We do not discriminate on the basis of race, color, national origin, age, disability, sex, sexual orientation, or gender identity.            Thank you!     Thank you for choosing [facility]  for your care. Our goal is always to provide you with excellent care. Hearing back from our patients is one way we can continue to improve our services. Please take a few minutes to complete the written survey that you may receive in the mail after your visit with us. Thank you!             Your Updated Medication List - Protect others around you: Learn how to safely use, store and throw away your medicines at www.disposemymeds.org.          This list is accurate as of 12/5/18 10:40 AM.  Always use your most recent med list.                   Brand Name Dispense Instructions for use Diagnosis    albuterol  108 (90 Base) MCG/ACT inhaler    VENTOLIN HFA    1 Inhaler    Inhale 1-2 puffs into the lungs every 4 hours as needed    Routine general medical examination at a health care facility       atorvastatin 10 MG tablet    LIPITOR    90 tablet    Take 1 tablet (10 mg) by mouth daily    Uncontrolled type 2 diabetes mellitus without complication, with long-term current use of insulin (H), Hyperlipidemia LDL goal <100       beclomethasone 40 MCG/ACT Aers IS A DISCONTINUED MEDICATION    QVAR    1 Inhaler    Inhale 2 puffs into the lungs 2 times daily    Mild persistent asthma without complication       * blood glucose monitoring meter device kit    NO BRAND SPECIFIED    1 kit    Use to test blood sugar 2 times daily or as directed. One touch or as covered by insurance.    Type 2 diabetes mellitus with stage 1 chronic kidney disease, without long-term current use of insulin (H)       * blood glucose monitoring meter device kit     1 kit    Check blood sugar two times daily or as directed    Uncontrolled type 2 diabetes mellitus without complication, with long-term current use of insulin (H)       blood glucose monitoring test strip    NO BRAND SPECIFIED    200 strip    Use to test blood sugars 2 times daily or as directed. One touch or as covered by insurance    Type 2 diabetes mellitus with stage 1 chronic kidney disease, without long-term current use of insulin (H)       continuous blood glucose monitoring sensor     3 each    For use with Freestyle Sandi Flash  for continuous monitioring of blood glucose levels. Replace sensor every 10 days.    Uncontrolled type 2 diabetes mellitus without complication, with long-term current use of insulin (H)       * fluocinonide 0.05 % external solution    LIDEX    60 mL    Apply sparingly to affected area twice daily as needed.  Do not apply to face.    Psoriasis of scalp       * fluocinonide 0.05 % external ointment    LIDEX    15 g    Apply topically 2 times daily     Psoriasis       FREESTYLE TEODORA READER Meagan     1 Device    1 kit as needed    Uncontrolled type 2 diabetes mellitus without complication, with long-term current use of insulin (H)       hydrochlorothiazide 25 MG tablet    HYDRODIURIL    90 tablet    Take 1 tablet (25 mg) by mouth every morning    Essential hypertension, benign, Uncontrolled type 2 diabetes mellitus without complication, with long-term current use of insulin (H)       * insulin aspart 100 UNIT/ML pen    NovoLOG FLEXPEN    75 mL    18-20 units before each meal + 1 unit per 25 points of blood sugar above 150. Total daily dose 80 units per day    Uncontrolled type 2 diabetes mellitus without complication, with long-term current use of insulin (H)       * insulin aspart 100 UNIT/ML pen    NovoLOG FLEXPEN    15 mL    18-20 units tid + 1 units per 25 points above 150.  Total daily dose 80 units per day    Uncontrolled type 2 diabetes mellitus without complication, with long-term current use of insulin (H)       insulin degludec 200 UNIT/ML pen    TRESIBA    27 mL    Inject 50 Units Subcutaneous daily    Uncontrolled type 2 diabetes mellitus without complication, with long-term current use of insulin (H)       insulin pen needle 32G X 5 MM     400 each    Use 4 pen needles daily or as directed.    Uncontrolled type 2 diabetes mellitus without complication, with long-term current use of insulin (H)       losartan 100 MG tablet    COZAAR    90 tablet    Take 1 tablet (100 mg) by mouth daily    Uncontrolled type 2 diabetes mellitus without complication, with long-term current use of insulin (H), Essential hypertension, benign       metFORMIN 500 MG 24 hr tablet    GLUCOPHAGE-XR    360 tablet    Take 4 tablets (2,000 mg) by mouth daily (with dinner)    Uncontrolled type 2 diabetes mellitus without complication, with long-term current use of insulin (H)       sertraline 50 MG tablet    ZOLOFT    90 tablet    Take 1 tablet (50 mg) by mouth daily    Adjustment  disorder with mixed anxiety and depressed mood       * Notice:  This list has 6 medication(s) that are the same as other medications prescribed for you. Read the directions carefully, and ask your doctor or other care provider to review them with you.

## 2018-12-05 NOTE — PROGRESS NOTES
SUBJECTIVE:   Lala George is a 48 year old female who presents to clinic today for the following health issues:      Diabetes Follow-up    Patient is checking blood sugars: three times daily.   Blood sugar testing frequency justification: Adjustment of medication(s)  Results are as follows:         am - 157         lunchtime - 170         suppertime - 170    Diabetic concerns: None and blood sugar frequently over 200     Symptoms of hypoglycemia (low blood sugar):YES, if not eating right      Paresthesias (numbness or burning in feet) or sores: No     Date of last diabetic eye exam: 5/7/18    Diabetes Management Resources    Hyperlipidemia Follow-Up      Rate your low fat/cholesterol diet?: fair    Taking statin?  Yes, no muscle aches from statin    Other lipid medications/supplements?:  none    Hypertension Follow-up      Outpatient blood pressures are being checked at home.  Results are good.    Low Salt Diet: low salt    BP Readings from Last 2 Encounters:   12/05/18 110/68   07/25/18 112/80     Hemoglobin A1C (%)   Date Value   07/25/2018 11.4 (H)   04/23/2018 11.9 (H)     LDL Cholesterol Calculated (mg/dL)   Date Value   02/21/2018 84   04/10/2017 66     Anxiety Follow-Up    Status since last visit: No change    Other associated symptoms:None    Complicating factors:   Significant life event: No   Current substance abuse: None  Depression symptoms: No  JOANNE-7 SCORE 1/24/2018   Total Score 0       JOANNE-7    Amount of exercise or physical activity: None    Problems taking medications regularly: No    Medication side effects: none    Diet: carbohydrate counting    Has had several episodes of pain in hands. Wakes during sleep, has happened 3 times in the last month. If she rubs her hands together they hurt. No numbness or tingling. They feel swollen and just hurt.    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 Uses her hands a lot. Has been doing this since March. No new activities or hobbies.  "    Psoriasis on scalp bothering her more.     Problem list and histories reviewed & adjusted, as indicated.  Additional history: as documented    Patient Active Problem List   Diagnosis     Mild persistent asthma     Essential hypertension     Dyslipidemia     Family history of breast cancer in mother     Atopic dermatitis     Morbid obesity (H)     Abnormal liver enzymes     High triglycerides     Uncontrolled type 2 diabetes mellitus without complication, with long-term current use of insulin (H)     Hyperlipidemia LDL goal <100     LUCIO (obstructive sleep apnea)     Past Surgical History:   Procedure Laterality Date     NO HISTORY OF SURGERY         Social History     Tobacco Use     Smoking status: Never Smoker     Smokeless tobacco: Never Used   Substance Use Topics     Alcohol use: Yes     Comment: rare--one to two per week     Family History   Problem Relation Age of Onset     Breast Cancer Mother 30        first diagnosed in her early 30's     Hypertension Mother      Diabetes Mother      C.A.D. Mother         CHF 50s ,pacemaker late 50s, CABG 60s     C.A.D. Maternal Aunt         60s     Glaucoma No family hx of      Macular Degeneration No family hx of            Reviewed and updated as needed this visit by clinical staff  Tobacco  Allergies  Meds  Med Hx  Surg Hx  Fam Hx  Soc Hx      Reviewed and updated as needed this visit by Provider         ROS:  Constitutional, HEENT, cardiovascular, pulmonary, gi and gu systems are negative, except as otherwise noted.    OBJECTIVE:     /68 (Cuff Size: Adult Large)   Pulse 94   Temp 98  F (36.7  C) (Oral)   Ht 1.651 m (5' 5\")   Wt 101.8 kg (224 lb 8 oz)   LMP 01/18/2013   SpO2 96%   BMI 37.36 kg/m    Body mass index is 37.36 kg/m .  GENERAL: healthy, alert and no distress  NECK: no adenopathy, no asymmetry, masses, or scars and thyroid normal to palpation  RESP: lungs clear to auscultation - no rales, rhonchi or wheezes  CV: regular rate and rhythm, " normal S1 S2, no S3 or S4, no murmur, click or rub, no peripheral edema and peripheral pulses strong  ABDOMEN: soft, nontender, no hepatosplenomegaly, no masses and bowel sounds normal  MS: no gross musculoskeletal defects noted, no edema  SKIN: posterior scalp with purplish macules with silvery scales.    Diagnostic Test Results:  Results for orders placed or performed in visit on 12/05/18   Hemoglobin A1c   Result Value Ref Range    Hemoglobin A1C 11.9 (H) 0 - 5.6 %       ASSESSMENT/PLAN:     1. Uncontrolled type 2 diabetes mellitus without complication, with long-term current use of insulin (H)  Not in good control, discussed potential sequelae of poor control. She has been missing doses of her novolog. Discussed improtance of getting these doses in. Will have her continue to follow with diab ed.  - atorvastatin (LIPITOR) 10 MG tablet; Take 1 tablet (10 mg) by mouth daily  Dispense: 90 tablet; Refill: 11  - insulin pen needle 32G X 5 MM; Use 4 pen needles daily or as directed.  Dispense: 400 each; Refill: 3  - hydrochlorothiazide (HYDRODIURIL) 25 MG tablet; Take 1 tablet (25 mg) by mouth every morning  Dispense: 90 tablet; Refill: 11  - losartan (COZAAR) 100 MG tablet; Take 1 tablet (100 mg) by mouth daily  Dispense: 90 tablet; Refill: 11  - Hemoglobin A1c    2. Hyperlipidemia LDL goal <100  Tolerating statin  - atorvastatin (LIPITOR) 10 MG tablet; Take 1 tablet (10 mg) by mouth daily  Dispense: 90 tablet; Refill: 11    3. Essential hypertension, benign  Good control  - hydrochlorothiazide (HYDRODIURIL) 25 MG tablet; Take 1 tablet (25 mg) by mouth every morning  Dispense: 90 tablet; Refill: 11  - losartan (COZAAR) 100 MG tablet; Take 1 tablet (100 mg) by mouth daily  Dispense: 90 tablet; Refill: 11    4. Adjustment disorder with mixed anxiety and depressed mood  stable  - sertraline (ZOLOFT) 50 MG tablet; Take 1 tablet (50 mg) by mouth daily  Dispense: 90 tablet; Refill: 6    5. Mild persistent asthma without  complication  stable    6. Psoriasis of scalp  refilled  - fluocinonide (LIDEX) 0.05 % external solution; Apply sparingly to affected area twice daily as needed.  Do not apply to face.  Dispense: 60 mL; Refill: 0  - DERMATOLOGY REFERRAL    7. Psoriasis    - fluocinonide (LIDEX) 0.05 % external ointment; Apply topically 2 times daily  Dispense: 15 g; Refill: 3    See Patient Instructions    Heidy Jama MD  [facility]

## 2018-12-06 ENCOUNTER — OFFICE VISIT (OUTPATIENT)
Dept: ENDOCRINOLOGY | Facility: CLINIC | Age: 48
End: 2018-12-06
Payer: COMMERCIAL

## 2018-12-06 VITALS
HEART RATE: 92 BPM | TEMPERATURE: 98.2 F | BODY MASS INDEX: 37.77 KG/M2 | WEIGHT: 227 LBS | SYSTOLIC BLOOD PRESSURE: 133 MMHG | DIASTOLIC BLOOD PRESSURE: 78 MMHG | OXYGEN SATURATION: 97 %

## 2018-12-06 PROCEDURE — 99214 OFFICE O/P EST MOD 30 MIN: CPT | Performed by: CLINICAL NURSE SPECIALIST

## 2018-12-06 RX ORDER — METFORMIN HCL 500 MG
2000 TABLET, EXTENDED RELEASE 24 HR ORAL
Qty: 360 TABLET | Refills: 3 | Status: SHIPPED | OUTPATIENT
Start: 2018-12-06 | End: 2019-12-10

## 2018-12-06 RX ORDER — FLASH GLUCOSE SENSOR
1 KIT MISCELLANEOUS
Qty: 2 EACH | Refills: 11 | Status: SHIPPED | OUTPATIENT
Start: 2018-12-06 | End: 2019-12-10

## 2018-12-06 RX ORDER — FLASH GLUCOSE SCANNING READER
1 EACH MISCELLANEOUS CONTINUOUS
Qty: 1 DEVICE | Refills: 0 | Status: SHIPPED | OUTPATIENT
Start: 2018-12-06 | End: 2022-08-01

## 2018-12-06 NOTE — PATIENT INSTRUCTIONS
Increase Tresiba to 60 units once daily  Increase Novolog to 20 units before each meal + continue 1 unit per 25 points of blood sugar above 150:    BG   151-200 - + 2 additional units Novolog (= 22 units before the meal)  201-250 - +4  Additional units (= 24 units)  251-300 - +6 additional units (= 26 units)  301-350 - +8 additional units (= 28 units)  Over 350 - + 10 additional units (= 30 units)    Consider an insulin pump - Omnipod, or MedTaDaweb, or T-slim are two other insulin pumps that are available.    Follow up in 3 months.

## 2018-12-06 NOTE — PROGRESS NOTES
Lala,  Here's a copy of your A1c.  We will discuss it at today's appointment.  Idalia Boudreaux NP  Endocrinology

## 2018-12-06 NOTE — PROGRESS NOTES
[facility] calls, also needs generic glucometer sent, LS sent test strips  Prescription approved per [facility] Refill Protocol.  Bessie Altman, RN, BSN

## 2018-12-06 NOTE — MR AVS SNAPSHOT
After Visit Summary   12/6/2018    Lala George    MRN: [id number]           Patient Information     Date Of Birth          1970        Visit Information        Provider Department      12/6/2018 7:00 AM Idalia Boudreaux APRN CNP [facility]        Today's Diagnoses     Uncontrolled type 2 diabetes mellitus without complication, with long-term current use of insulin (H)          Care Instructions        Increase Tresiba to 60 units once daily  Increase Novolog to 20 units before each meal + continue 1 unit per 25 points of blood sugar above 150:    BG   151-200 - + 2 additional units Novolog (= 22 units before the meal)  201-250 - +4  Additional units (= 24 units)  251-300 - +6 additional units (= 26 units)  301-350 - +8 additional units (= 28 units)  Over 350 - + 10 additional units (= 30 units)    Consider an insulin pump - Omnipod, or Medtronic, or T-slim are two other insulin pumps that are available.    Follow up in 3 months.          Follow-ups after your visit        Your next 10 appointments already scheduled     Dec 10, 2018  1:30 PM CST   PROCEDURE with Levar Beth DPM   [facility]    [address] [place] [address]  [address]  [facility] [address]   [phone]            Mar 06, 2019  8:10 AM CST   PHYSICAL with Heidy Jama MD   [facility]    [address] [place] [address]  [address]  [facility] [address]   [phone]            Mar 07, 2019  7:00 AM CST   Return Visit with EDMOND Velez CNP   [facility]    [address]  [facility] [id number] [phone]              Who to contact     If you have questions or need follow up information about today's clinic visit or your schedule please contact [facility] directly at [phone].  Normal or non-critical lab and imaging results will  be communicated to you by [employer], letter or phone within 4 business days after the clinic has received the results. If you do not hear from us within 7 days, please contact the clinic through [employer] or phone. If you have a critical or abnormal lab result, we will notify you by phone as soon as possible.  Submit refill requests through [employer] or call your pharmacy and they will forward the refill request to us. Please allow 3 business days for your refill to be completed.          Additional Information About Your Visit        Social Project Information     [employer] gives you secure access to your electronic health record. If you see a primary care provider, you can also send messages to your care team and make appointments. If you have questions, please call your primary care clinic.  If you do not have a primary care provider, please call [phone] and they will assist you.        Care EveryWhere ID     This is your Care EveryWhere ID. This could be used by other organizations to access your [facility] medical records  [id number]        Your Vitals Were     Pulse Temperature Last Period Pulse Oximetry Breastfeeding? BMI (Body Mass Index)    92 98.2  F (36.8  C) (Oral) 01/18/2013 97% No 37.77 kg/m2       Blood Pressure from Last 3 Encounters:   12/06/18 133/78   12/05/18 110/68   07/25/18 112/80    Weight from Last 3 Encounters:   12/06/18 103 kg (227 lb)   12/05/18 101.8 kg (224 lb 8 oz)   07/25/18 100.7 kg (221 lb 14.4 oz)              Today, you had the following     No orders found for display         Today's Medication Changes          These changes are accurate as of 12/6/18  7:43 AM.  If you have any questions, ask your nurse or doctor.               These medicines have changed or have updated prescriptions.        Dose/Directions    * blood glucose monitoring test strip   Commonly known as:  NO BRAND SPECIFIED   This may have changed:  Another medication with the same name was added. Make sure you understand  how and when to take each.   Used for:  Type 2 diabetes mellitus with stage 1 chronic kidney disease, without long-term current use of insulin (H)   Changed by:  Idalia Boudreaux APRN CNP        Use to test blood sugars 2 times daily or as directed. One touch or as covered by insurance   Quantity:  200 strip   Refills:  11       * blood glucose monitoring test strip   Commonly known as:  NO BRAND SPECIFIED   This may have changed:  You were already taking a medication with the same name, and this prescription was added. Make sure you understand how and when to take each.   Used for:  Uncontrolled type 2 diabetes mellitus without complication, with long-term current use of insulin (H)   Changed by:  Idalia Boudreaux APRN CNP        Use to test blood sugar 1 times daily or as directed.   Quantity:  50 strip   Refills:  11       FREESTYLE TEODORA 14 DAY READER Meagan   This may have changed:    - when to take this  - reasons to take this   Used for:  Uncontrolled type 2 diabetes mellitus without complication, with long-term current use of insulin (H)   Changed by:  Idalia Boudreaux APRN CNP        Dose:  1 each   1 each continuous   Quantity:  1 Device   Refills:  0       FREESTYLE TEODORA 14 DAY SENSOR Misc   This may have changed:    - how much to take  - how to take this  - when to take this  - additional instructions   Used for:  Uncontrolled type 2 diabetes mellitus without complication, with long-term current use of insulin (H)   Changed by:  Idalia Boudreaux APRN CNP        Dose:  1 each   1 each every 14 days   Quantity:  2 each   Refills:  11       insulin aspart 100 UNIT/ML pen   Commonly known as:  NovoLOG FLEXPEN   This may have changed:    - additional instructions  - Another medication with the same name was removed. Continue taking this medication, and follow the directions you see here.   Used for:  Uncontrolled type 2 diabetes mellitus without complication, with long-term current use of insulin  (H)   Changed by:  Idalia Boudreaux APRN CNP        20 units before each meal + 1 unit per 25 points of blood sugar above 150. Total daily dose 80 units per day   Quantity:  75 mL   Refills:  3       insulin degludec 200 UNIT/ML pen   Commonly known as:  TRESIBA   Indication:  50 UNITS   This may have changed:  how much to take   Used for:  Uncontrolled type 2 diabetes mellitus without complication, with long-term current use of insulin (H)   Changed by:  Idalia Boudreaux APRN CNP        Dose:  60 Units   Inject 60 Units Subcutaneous daily   Quantity:  27 mL   Refills:  11       * Notice:  This list has 2 medication(s) that are the same as other medications prescribed for you. Read the directions carefully, and ask your doctor or other care provider to review them with you.         Where to get your medicines      These medications were sent to [facility] - [address]   [address] [place] [address] [address]     Phone:  [phone]     blood glucose monitoring test strip    FREESTYLE TEODORA 14 DAY READER Meagan    FREESTYLE TEODORA 14 DAY SENSOR Misc    insulin aspart 100 UNIT/ML pen    insulin degludec 200 UNIT/ML pen    metFORMIN 500 MG 24 hr tablet                Primary Care Provider Office Phone # Fax #    Heidy Jama [phone]       [address] [place] [address] [address] [place] [address]        Equal Access to Services     ASHER MEDRANO AH: Hadii aad ku hadasho Soomaali, waaxda luqadaha, qaybta kaalmada adeegyada, waxay sruthi haywilly blunt. So [facility] [phone].    ATENCIÓN: Si habla español, tiene a stallworth disposición servicios gratuitos de asistencia lingüística. Llame al [phone].    We comply with applicable federal civil rights laws and [place] laws. We do not discriminate on the basis of race, color, national origin, age, disability, sex, sexual orientation, or gender identity.            Thank you!     Thank you  for choosing [facility]  for your care. Our goal is always to provide you with excellent care. Hearing back from our patients is one way we can continue to improve our services. Please take a few minutes to complete the written survey that you may receive in the mail after your visit with us. Thank you!             Your Updated Medication List - Protect others around you: Learn how to safely use, store and throw away your medicines at www.disposemymeds.org.          This list is accurate as of 12/6/18  7:43 AM.  Always use your most recent med list.                   Brand Name Dispense Instructions for use Diagnosis    albuterol 108 (90 Base) MCG/ACT inhaler    VENTOLIN HFA    1 Inhaler    Inhale 1-2 puffs into the lungs every 4 hours as needed    Routine general medical examination at a health care facility       atorvastatin 10 MG tablet    LIPITOR    90 tablet    Take 1 tablet (10 mg) by mouth daily    Uncontrolled type 2 diabetes mellitus without complication, with long-term current use of insulin (H), Hyperlipidemia LDL goal <100       beclomethasone 40 MCG/ACT Aers IS A DISCONTINUED MEDICATION    QVAR    1 Inhaler    Inhale 2 puffs into the lungs 2 times daily    Mild persistent asthma without complication       * blood glucose monitoring meter device kit    NO BRAND SPECIFIED    1 kit    Use to test blood sugar 2 times daily or as directed. One touch or as covered by insurance.    Type 2 diabetes mellitus with stage 1 chronic kidney disease, without long-term current use of insulin (H)       * blood glucose monitoring meter device kit     1 kit    Check blood sugar two times daily or as directed    Uncontrolled type 2 diabetes mellitus without complication, with long-term current use of insulin (H)       * blood glucose monitoring test strip    NO BRAND SPECIFIED    200 strip    Use to test blood sugars 2 times daily or as directed. One touch or as covered by insurance    Type 2 diabetes  mellitus with stage 1 chronic kidney disease, without long-term current use of insulin (H)       * blood glucose monitoring test strip    NO BRAND SPECIFIED    50 strip    Use to test blood sugar 1 times daily or as directed.    Uncontrolled type 2 diabetes mellitus without complication, with long-term current use of insulin (H)       * fluocinonide 0.05 % external solution    LIDEX    60 mL    Apply sparingly to affected area twice daily as needed.  Do not apply to face.    Psoriasis of scalp       * fluocinonide 0.05 % external ointment    LIDEX    15 g    Apply topically 2 times daily    Psoriasis       FREESTYLE TEODORA 14 DAY READER Meagan     1 Device    1 each continuous    Uncontrolled type 2 diabetes mellitus without complication, with long-term current use of insulin (H)       FREESTYLE TEODORA 14 DAY SENSOR Misc     2 each    1 each every 14 days    Uncontrolled type 2 diabetes mellitus without complication, with long-term current use of insulin (H)       hydrochlorothiazide 25 MG tablet    HYDRODIURIL    90 tablet    Take 1 tablet (25 mg) by mouth every morning    Essential hypertension, benign, Uncontrolled type 2 diabetes mellitus without complication, with long-term current use of insulin (H)       insulin aspart 100 UNIT/ML pen    NovoLOG FLEXPEN    75 mL    20 units before each meal + 1 unit per 25 points of blood sugar above 150. Total daily dose 80 units per day    Uncontrolled type 2 diabetes mellitus without complication, with long-term current use of insulin (H)       insulin degludec 200 UNIT/ML pen    TRESIBA    27 mL    Inject 60 Units Subcutaneous daily    Uncontrolled type 2 diabetes mellitus without complication, with long-term current use of insulin (H)       insulin pen needle 32G X 5 MM     400 each    Use 4 pen needles daily or as directed.    Uncontrolled type 2 diabetes mellitus without complication, with long-term current use of insulin (H)       losartan 100 MG tablet    COZAAR    90  tablet    Take 1 tablet (100 mg) by mouth daily    Uncontrolled type 2 diabetes mellitus without complication, with long-term current use of insulin (H), Essential hypertension, benign       metFORMIN 500 MG 24 hr tablet    GLUCOPHAGE-XR    360 tablet    Take 4 tablets (2,000 mg) by mouth daily (with dinner)    Uncontrolled type 2 diabetes mellitus without complication, with long-term current use of insulin (H)       sertraline 50 MG tablet    ZOLOFT    90 tablet    Take 1 tablet (50 mg) by mouth daily    Adjustment disorder with mixed anxiety and depressed mood       * Notice:  This list has 6 medication(s) that are the same as other medications prescribed for you. Read the directions carefully, and ask your doctor or other care provider to review them with you.

## 2018-12-06 NOTE — PROGRESS NOTES
Name: Lala George  Seen at the request of Heidy Jama for Diabetes (2018).  HPI:  Lala George is a 48 year old female who presents for the management of:    1. Type 2 DM:  Originally diagnosed: 2014.  Glucose slightly elevated 109-113 the year prior to diagnosis.  Glucose at the time of diagnosis was 317.    Current Regimen: Metformin XR 2000 mg/day, Tresiba 50 units q hs, Novolog 18 units tid +1:25>150.  Did not increase Tresiba to 60 units as recommended at last visit.  Not consistent about taking Novolog - forgets.    Initially treated with Metformin, Lantus insulin was added a short time after diagnosis. Lantus was later changed to Basaglar due to insurance, then changed to Tresiba.  Humalog then added.  Was briefly treated with glipizide in , for about 2-3 months, discontinued due to ineffectiveness  Trulicity was added 2017, this was later changed to Tanzeum due to insurance formulary. Tanzeum no longer available.    Ran out of Tresiba 2 weeks prior to last visit in July.    BS checks: Continuous, Sandi personal CGM  Sandi:  Average B.  93% above target range.  7% in target range.  0% below target range.      Works night shift 10 pm to 6 am, Tuesday - .    Complications:   Diabetes Complications  Description / Detail    Diabetic Retinopathy  No retinopathy, last eye exam 2018   CAD / PAD  No   Neuropathy  No   Nephropathy / Microalbuminuria  Elevated urine microalbumin x 1   Gastroparesis  No   Hypoglycemia Unawareness  No     2. Hypertension: Blood Pressure today:   BP Readings from Last 3 Encounters:   18 133/78   18 110/68   18 112/80   .  Blood pressure medications include losartan 100 mg qd, HCTZ 25 mg qd.  .    3. Hyperlipidemia: Takes atorvastatin 10 mg qd for lipid control.  Denies muscle aches of pains.       4. Prevention:  Flu Shot- 2018  Pneumovax- 3/2/2015  Opthalmology-Yes: 2018  Dental-recommend semiannually  ASA-No  Smoking-  No  PMH/PSH:  Past Medical History:   Diagnosis Date     BENIGN HYPERTENSION 2/15/2006     Mild persistent asthma      Past Surgical History:   Procedure Laterality Date     NO HISTORY OF SURGERY       Family Hx:  Family History   Problem Relation Age of Onset     Breast Cancer Mother 30     first diagnosed in her early 30's     Hypertension Mother      Diabetes Mother      C.AANGEL. Mother      CHF 50s ,pacemaker late 50s, CABG 60s     C.AANGEL. Maternal Aunt      60s     Glaucoma No family hx of      Macular Degeneration No family hx of      Thyroid disease: No         DM2: Yes: mother and one sibling         Autoimmune: DM1, SLE, RA, Vitiligo No    Social Hx:  Social History     Social History     Marital status: Single     Spouse name: N/A     Number of children: N/A     Years of education: N/A     Occupational History      Other     part time, [employer]     Social History Main Topics     Smoking status: Never Smoker     Smokeless tobacco: Never Used     Alcohol use Yes      Comment: rare--one to two per week     Drug use: No     Sexual activity: No     Other Topics Concern     Not on file     Social History Narrative          MEDICATIONS:  has a current medication list which includes the following prescription(s): albuterol, atorvastatin, beclomethasone, blood glucose monitoring, blood glucose monitoring, blood glucose monitoring, blood glucose monitoring, freestyle estiven 14 day reader, freestyle estiven 14 day sensor, fluocinonide, fluocinonide, hydrochlorothiazide, insulin aspart, insulin degludec, insulin pen needle, losartan, metformin, and sertraline.    ROS     ROS: 10 point ROS neg other than the symptoms noted above in the HPI.    Physical Exam   VS: /78 (BP Location: Right arm, Patient Position: Chair, Cuff Size: Adult Large)  Pulse 92  Temp 98.2  F (36.8  C) (Oral)  Wt 103 kg (227 lb)  LMP 01/18/2013  SpO2 97%  Breastfeeding? No  BMI 37.77 kg/m2  GENERAL: AXOX3, NAD, well dressed, answering  questions appropriately, appears stated age.  HEENT:  no exophthalmos, no proptosis, EOMI, no lig lag, no retraction  CV: RRR, no rubs, gallops, no murmurs  LUNGS: CTAB, no wheezes, rales, or rhonchi  ABDOMEN: soft, nontender, nondistended  EXTREMITIES: no edema, +pulses, no rashes, no lesions  NEUROLOGY: CN grossly intact, no tremors  MSK: grossly intact  SKIN: no rashes, no lesions    LABS:  A1c:   Component      Latest Ref Rng & Units 4/23/2018 7/25/2018 12/5/2018   Hemoglobin A1C      0 - 5.6 % 11.9 (H) 11.4 (H) 11.9 (H)     Basic Metabolic Panel:  !COMPREHENSIVE Latest Ref Rng & Units 1/24/2018 3/6/2018   SODIUM 133 - 144 mmol/L 136    POTASSIUM 3.4 - 5.3 mmol/L 4.1    CHLORIDE 94 - 109 mmol/L 101    BUN 7 - 30 mg/dL 18    Creatinine 0.52 - 1.04 mg/dL 0.73    Glucose 70 - 99 mg/dL 344 (H) 221 (H)   ANION GAP 3 - 14 mmol/L 6    CALCIUM 8.5 - 10.1 mg/dL 9.0    ALBUMIN 3.4 - 5.0 g/dL 3.9    PROTEIN, TOTAL 6.8 - 8.8 g/dL 7.1      LFTS/Cholesterol Panel:  !LIPID/HEPATIC Latest Ref Rng & Units 1/24/2018 2/21/2018   CHOLESTEROL <200 mg/dL  174   TRIGLYCERIDES <150 mg/dL  138   HDL CHOLESTEROL >49 mg/dL  62   LDL CHOLESTEROL, CALCULATED <100 mg/dL  84   VLDL-CHOLESTEROL 0 - 30 mg/dL     NON HDL CHOLESTEROL <130 mg/dL  112   CHOLESTEROL/HDL RATIO 0.0 - 5.0     AST 0 - 45 U/L 28    ALT 0 - 50 U/L 37      Thyroid Function:   !THYROID Latest Ref Rng & Units 1/24/2018   TSH 0.40 - 4.00 mU/L 2.40     Urine MicroAlbumin:  Component    Latest Ref Rng & Units 1/24/2018   Creatinine Urine    mg/dL 41   Albumin Urine mg/L     6   Albumin Urine mg/g Cr    0 - 25 mg/g Cr 14.99       Vitamin D:    All pertinent notes, labs, and images personally reviewed by me.     A/P  Ms.Shari George is a 48 year old here for the evaluation/management of diabetes:    1. DM2 - Uncontrolled, likely due to missed Novolog doses  Increase Tresiba to 60 units qd.  Increase Novolog to 20 units tid + 1:25>150.  Take Novolog consistently before each  meal.  Change to 14-day Sandi  Consider insulin pump therapy - demo Omnipod provided.  Follow up 3 months, advised to message me if blood sugars are still averaging 160 or higher after the above changes.   There is some variability among people, most will usually develop symptoms suggestive of hypoglycemia when blood glucose levels are lowered to the mid 60's. The first set of symptoms are called adrenergic. Patients may experience any of the following nervousness, sweating, intense hunger, trembling, weakness, palpitations, and difficulty speaking.   The acute management of hypoglycemia involves the rapid delivery of a source of easily absorbed sugar. Regular soda, juice, lifesavers, table sugar, are good options. 15 grams of glucose is the dose that is given, followed by an assessment of symptoms and a blood glucose check if possible. If after 10 minutes there is no improvement, another 10-15 grams should be given. This can be repeated up to three times. The equivalency of 10-15 grams of glucose (approximate servings) are: 3-5 hard candies, 3 teaspoons of sugar, or 1/2 cup of regular soda or juice.    2. Hypertension - Controlled.    3. Hyperlipidemia - On statin therapy.    Labs ordered today:   No orders of the defined types were placed in this encounter.      Radiology/Consults ordered today: None    All questions were answered.  The patient indicates understanding of the above issues and agrees with the plan set forth.  Total face to face time greater than or equal to 25 minutes.       Follow-up:  3 months in clinic with kobe Boudreaux NP  Endocrinology  [facility]  CC:

## 2018-12-06 NOTE — LETTER
2018         RE: Lala George  [address]  [facility] [address]        Dear Colleague,    Thank you for referring your patient, Lala George, to the [facility]. Please see a copy of my visit note below.    Name: Lala George  Seen at the request of Heidy Jama for Diabetes (2018).  HPI:  Lala George is a 48 year old female who presents for the management of:    1. Type 2 DM:  Originally diagnosed: 2014.  Glucose slightly elevated 109-113 the year prior to diagnosis.  Glucose at the time of diagnosis was 317.    Current Regimen: Metformin XR 2000 mg/day, Tresiba 50 units q hs, Novolog 18 units tid +1:25>150.  Did not increase Tresiba to 60 units as recommended at last visit.  Not consistent about taking Novolog - forgets.    Initially treated with Metformin, Lantus insulin was added a short time after diagnosis. Lantus was later changed to Basaglar due to insurance, then changed to Tresiba.  Humalog then added.  Was briefly treated with glipizide in , for about 2-3 months, discontinued due to ineffectiveness  Trulicity was added 2017, this was later changed to Tanzeum due to insurance formulary. Tanzeum no longer available.    Ran out of Tresiba 2 weeks prior to last visit in July.    BS checks: Continuous, Sandi personal CGM  Sandi:  Average B.  93% above target range.  7% in target range.  0% below target range.      Works night shift 10 pm to 6 am, Tuesday - .    Complications:   Diabetes Complications  Description / Detail    Diabetic Retinopathy  No retinopathy, last eye exam 2018   CAD / PAD  No   Neuropathy  No   Nephropathy / Microalbuminuria  Elevated urine microalbumin x 1   Gastroparesis  No   Hypoglycemia Unawareness  No     2. Hypertension: Blood Pressure today:   BP Readings from Last 3 Encounters:   18 133/78   18 110/68   18 112/80   .  Blood pressure medications include losartan 100 mg qd, HCTZ 25 mg  qd.  .    3. Hyperlipidemia: Takes atorvastatin 10 mg qd for lipid control.  Denies muscle aches of pains.       4. Prevention:  Flu Shot- 8/2018  Pneumovax- 3/2/2015  Opthalmology-Yes: 5/2018  Dental-recommend semiannually  ASA-No  Smoking- No  PMH/PSH:  Past Medical History:   Diagnosis Date     BENIGN HYPERTENSION 2/15/2006     Mild persistent asthma      Past Surgical History:   Procedure Laterality Date     NO HISTORY OF SURGERY       Family Hx:  Family History   Problem Relation Age of Onset     Breast Cancer Mother 30     first diagnosed in her early 30's     Hypertension Mother      Diabetes Mother      C.A.D. Mother      CHF 50s ,pacemaker late 50s, CABG 60s     C.A.D. Maternal Aunt      60s     Glaucoma No family hx of      Macular Degeneration No family hx of      Thyroid disease: No         DM2: Yes: mother and one sibling         Autoimmune: DM1, SLE, RA, Vitiligo No    Social Hx:  Social History     Social History     Marital status: Single     Spouse name: N/A     Number of children: N/A     Years of education: N/A     Occupational History      Other     part time, [employer]     Social History Main Topics     Smoking status: Never Smoker     Smokeless tobacco: Never Used     Alcohol use Yes      Comment: rare--one to two per week     Drug use: No     Sexual activity: No     Other Topics Concern     Not on file     Social History Narrative          MEDICATIONS:  has a current medication list which includes the following prescription(s): albuterol, atorvastatin, beclomethasone, blood glucose monitoring, blood glucose monitoring, blood glucose monitoring, blood glucose monitoring, freestyle estiven 14 day reader, freestyle estiven 14 day sensor, fluocinonide, fluocinonide, hydrochlorothiazide, insulin aspart, insulin degludec, insulin pen needle, losartan, metformin, and sertraline.    ROS     ROS: 10 point ROS neg other than the symptoms noted above in the HPI.    Physical Exam   VS: /78 (BP Location:  Right arm, Patient Position: Chair, Cuff Size: Adult Large)  Pulse 92  Temp 98.2  F (36.8  C) (Oral)  Wt 103 kg (227 lb)  LMP 01/18/2013  SpO2 97%  Breastfeeding? No  BMI 37.77 kg/m2  GENERAL: AXOX3, NAD, well dressed, answering questions appropriately, appears stated age.  HEENT:  no exophthalmos, no proptosis, EOMI, no lig lag, no retraction  CV: RRR, no rubs, gallops, no murmurs  LUNGS: CTAB, no wheezes, rales, or rhonchi  ABDOMEN: soft, nontender, nondistended  EXTREMITIES: no edema, +pulses, no rashes, no lesions  NEUROLOGY: CN grossly intact, no tremors  MSK: grossly intact  SKIN: no rashes, no lesions    LABS:  A1c:   Component      Latest Ref Rng & Units 4/23/2018 7/25/2018 12/5/2018   Hemoglobin A1C      0 - 5.6 % 11.9 (H) 11.4 (H) 11.9 (H)     Basic Metabolic Panel:  !COMPREHENSIVE Latest Ref Rng & Units 1/24/2018 3/6/2018   SODIUM 133 - 144 mmol/L 136    POTASSIUM 3.4 - 5.3 mmol/L 4.1    CHLORIDE 94 - 109 mmol/L 101    BUN 7 - 30 mg/dL 18    Creatinine 0.52 - 1.04 mg/dL 0.73    Glucose 70 - 99 mg/dL 344 (H) 221 (H)   ANION GAP 3 - 14 mmol/L 6    CALCIUM 8.5 - 10.1 mg/dL 9.0    ALBUMIN 3.4 - 5.0 g/dL 3.9    PROTEIN, TOTAL 6.8 - 8.8 g/dL 7.1      LFTS/Cholesterol Panel:  !LIPID/HEPATIC Latest Ref Rng & Units 1/24/2018 2/21/2018   CHOLESTEROL <200 mg/dL  174   TRIGLYCERIDES <150 mg/dL  138   HDL CHOLESTEROL >49 mg/dL  62   LDL CHOLESTEROL, CALCULATED <100 mg/dL  84   VLDL-CHOLESTEROL 0 - 30 mg/dL     NON HDL CHOLESTEROL <130 mg/dL  112   CHOLESTEROL/HDL RATIO 0.0 - 5.0     AST 0 - 45 U/L 28    ALT 0 - 50 U/L 37      Thyroid Function:   !THYROID Latest Ref Rng & Units 1/24/2018   TSH 0.40 - 4.00 mU/L 2.40     Urine MicroAlbumin:  Component    Latest Ref Rng & Units 1/24/2018   Creatinine Urine    mg/dL 41   Albumin Urine mg/L     6   Albumin Urine mg/g Cr    0 - 25 mg/g Cr 14.99       Vitamin D:    All pertinent notes, labs, and images personally reviewed by me.     A/P  Ms.Shari George is a 48 year  old here for the evaluation/management of diabetes:    1. DM2 - Uncontrolled, likely due to missed Novolog doses  Increase Tresiba to 60 units qd.  Increase Novolog to 20 units tid + 1:25>150.  Take Novolog consistently before each meal.  Change to 14-day Sandi  Consider insulin pump therapy - demo Omnipod provided.  Follow up 3 months, advised to message me if blood sugars are still averaging 160 or higher after the above changes.   There is some variability among people, most will usually develop symptoms suggestive of hypoglycemia when blood glucose levels are lowered to the mid 60's. The first set of symptoms are called adrenergic. Patients may experience any of the following nervousness, sweating, intense hunger, trembling, weakness, palpitations, and difficulty speaking.   The acute management of hypoglycemia involves the rapid delivery of a source of easily absorbed sugar. Regular soda, juice, lifesavers, table sugar, are good options. 15 grams of glucose is the dose that is given, followed by an assessment of symptoms and a blood glucose check if possible. If after 10 minutes there is no improvement, another 10-15 grams should be given. This can be repeated up to three times. The equivalency of 10-15 grams of glucose (approximate servings) are: 3-5 hard candies, 3 teaspoons of sugar, or 1/2 cup of regular soda or juice.    2. Hypertension - Controlled.    3. Hyperlipidemia - On statin therapy.    Labs ordered today:   No orders of the defined types were placed in this encounter.      Radiology/Consults ordered today: None    All questions were answered.  The patient indicates understanding of the above issues and agrees with the plan set forth.  Total face to face time greater than or equal to 25 minutes.       Follow-up:  3 months in clinic with kobe Boudreaux NP  Endocrinology  [facility]  CC:     Again, thank you for allowing me to participate in the care of your patient.         Sincerely,        EDMOND Velez CNP

## 2018-12-07 ASSESSMENT — ASTHMA QUESTIONNAIRES: ACT_TOTALSCORE: 23

## 2018-12-07 ASSESSMENT — ANXIETY QUESTIONNAIRES: GAD7 TOTAL SCORE: 1

## 2018-12-10 ENCOUNTER — OFFICE VISIT (OUTPATIENT)
Dept: PODIATRY | Facility: CLINIC | Age: 48
End: 2018-12-10
Payer: COMMERCIAL

## 2018-12-10 VITALS
WEIGHT: 227 LBS | SYSTOLIC BLOOD PRESSURE: 118 MMHG | DIASTOLIC BLOOD PRESSURE: 70 MMHG | HEIGHT: 65 IN | BODY MASS INDEX: 37.82 KG/M2

## 2018-12-10 DIAGNOSIS — L60.0 ONYCHOCRYPTOSIS: Primary | ICD-10-CM

## 2018-12-10 PROCEDURE — 11750 EXCISION NAIL&NAIL MATRIX: CPT | Mod: TA | Performed by: PODIATRIST

## 2018-12-10 PROCEDURE — 99203 OFFICE O/P NEW LOW 30 MIN: CPT | Mod: 25 | Performed by: PODIATRIST

## 2018-12-10 RX ORDER — SILVER SULFADIAZINE 10 MG/G
CREAM TOPICAL
Qty: 85 G | Refills: 1 | Status: SHIPPED | OUTPATIENT
Start: 2018-12-10 | End: 2019-03-06

## 2018-12-10 ASSESSMENT — MIFFLIN-ST. JEOR: SCORE: 1650.55

## 2018-12-10 NOTE — PATIENT INSTRUCTIONS
Thank you for choosing [facility]!    DR. TRACY'S CLINIC LOCATIONS:   MONDAY - [place] TUESDAY - [place]   [address] [facility]  [address]   [place] [address] [place] [address]   [phone]       THURSDAY AM - [place] THURSDAY PM - [place]   [address]   [place] [address] [place] [address]   [phone]       FRIDAY AM - [place] SET UP SURGERY: [phone] [address] APPOINTMENTS: [phone]   [place] [address] BILLING QUESTIONS: [phone] FAX NUMBER: [phone]     INGROWN TOENAIL REMOVAL HOME INSTRUCTIONS  1. After the procedure, go home and elevate the foot/feet for the remainder of the day/evening as able. This is to minimize swelling, control pain, and limit post-procedural complications. The pre-procedural injection may cause your toe to be numb anywhere from 1-2 hours.    2. You can take Tylenol, Ibuprofen, Advil, etc as needed for pain if tolerated. Follow label instructions.     3. If you have been given a prescription for antibiotics, take them as instructed and complete the entire prescription.    4. Keep dressing intact until the following morning. Then remove the bandage (you may need to soak it in warm soapy water as the bandage will likely adhere to your skin).    5. Start soaking in warm soapy water for 5-10 minutes twice a day. Wash the toe thoroughly, dry the toe thoroughly. Apply antibiotic wound ointment to base of wound and cover with gauze and Coban dressing (not too tightly) until it stops draining. This may take a few days to weeks, but at that point, you may continue with antibiotic ointment and a band-aid, or you may stop applying a dressing all together. Dressing changes should be done twice daily if you had the permanent/chemical procedure done.    6. You may do activities as tolerated the following day. Find a shoe that is  comfortable and minimizes the amount of rubbing on your toe, as this may increase pain, swelling, etc.    7. Monitor for signs of infection. With this procedure, it is common to have mild surrounding redness and drainage. If the redness involves the entire great toe or if you notice red streaks on top of your foot, or if you experience any nausea, vomiting, chills, fevers > 101 degrees, call clinic for a quick appointment.        Body Mass Index (BMI)  Many things can cause foot and ankle problems. Foot structure, activity level, foot mechanics and injuries are common causes of pain.  One very important issue that often goes unmentioned, is body weight.  Extra weight can cause increased stress on muscles, ligaments, bones and tendons.  Sometimes just a few extra pounds is all it takes to put one over her/his threshold. Without reducing that stress, it can be difficult to alleviate pain. Some people are uncomfortable addressing this issue, but we feel it is important for you to think about it. As Foot &  Ankle specialists, our job is addressing the lower extremity problem and possible causes. Regarding extra body weight, we encourage patients to discuss diet and weight management plans with their primary care doctors. It is this team approach that gives you the best opportunity for pain relief and getting you back on your feet.

## 2018-12-10 NOTE — PROGRESS NOTES
"Foot & Ankle Surgery  December 10, 2018    CC: \"ingrown toenails in big toes\"    I was asked to see Lala George regarding the chief complaint by:  Dr. HERACLIO Jama    HPI:  Pt is a 48 year old female who presents with above complaint.  Onychocryptosis medial hallux bilateral x 4 weeks. No injury noted.  Pain 7/10 \"all the time\".  No current treatment.  Worse with direct pressure.  She underwent P&A lateral great toe bilateral with Dr Warner in 2014.      ROS:   Pos for CC.  The patient denies current nausea, vomiting, chills, fevers, belly pain, calf pain, chest pain or SOB.  Complete remainder of ROS is otherwise neg.    VITALS:    Vitals:    12/10/18 1332   BP: 118/70   Weight: 103 kg (227 lb)   Height: 1.651 m (5' 5\")       PMH:    Past Medical History:   Diagnosis Date     BENIGN HYPERTENSION 2/15/2006     Mild persistent asthma        SXHX:    Past Surgical History:   Procedure Laterality Date     NO HISTORY OF SURGERY          MEDS:    Current Outpatient Medications   Medication     albuterol (VENTOLIN HFA) 108 (90 Base) MCG/ACT inhaler     atorvastatin (LIPITOR) 10 MG tablet     beclomethasone (QVAR) 40 MCG/ACT Inhaler     blood glucose monitoring (NO BRAND SPECIFIED) meter device kit     blood glucose monitoring (NO BRAND SPECIFIED) meter device kit     blood glucose monitoring (NO BRAND SPECIFIED) test strip     blood glucose monitoring (NO BRAND SPECIFIED) test strip     blood glucose monitoring (ONE TOUCH ULTRA 2) meter device kit     Continuous Blood Gluc  (FREESTYLE TEODORA 14 DAY READER) NADER     Continuous Blood Gluc Sensor (FREESTYLE TEODORA 14 DAY SENSOR) MISC     fluocinonide (LIDEX) 0.05 % external ointment     fluocinonide (LIDEX) 0.05 % external solution     hydrochlorothiazide (HYDRODIURIL) 25 MG tablet     insulin aspart (NOVOLOG FLEXPEN) 100 UNIT/ML pen     insulin degludec (TRESIBA) 200 UNIT/ML pen     insulin pen needle 32G X 5 MM     losartan (COZAAR) 100 MG tablet     metFORMIN " (GLUCOPHAGE-XR) 500 MG 24 hr tablet     sertraline (ZOLOFT) 50 MG tablet     No current facility-administered medications for this visit.        ALL:     Allergies   Allergen Reactions     Bacitracin Rash       FMH:    Family History   Problem Relation Age of Onset     Breast Cancer Mother 30        first diagnosed in her early 30's     Hypertension Mother      Diabetes Mother      C.A.D. Mother         CHF 50s ,pacemaker late 50s, CABG 60s     C.A.D. Maternal Aunt         60s     Glaucoma No family hx of      Macular Degeneration No family hx of        SocHx:    Social History     Socioeconomic History     Marital status: Single     Spouse name: Not on file     Number of children: Not on file     Years of education: Not on file     Highest education level: Not on file   Social Needs     Financial resource strain: Not on file     Food insecurity - worry: Not on file     Food insecurity - inability: Not on file     Transportation needs - medical: Not on file     Transportation needs - non-medical: Not on file   Occupational History     Employer: OTHER     Comment: part time, [employer]   Tobacco Use     Smoking status: Never Smoker     Smokeless tobacco: Never Used   Substance and Sexual Activity     Alcohol use: Yes     Comment: rare--one to two per week     Drug use: No     Sexual activity: No   Other Topics Concern     Parent/sibling w/ CABG, MI or angioplasty before 65F 55M? Not Asked   Social History Narrative     Not on file           EXAMINATION:  Gen:   No apparent distress  Neuro:   A&Ox3, no deficits  Psych:    Answering questions appropriately for age and situation with normal affect  Head:    NCAT  Eye:    Visual scanning without deficit  Ear:    Response to auditory stimuli wnl  Lung:    Non-labored breathing on RA noted  Abd:    NTND per patient report  Lymph:    Neg for pitting/non-pitting edema BLE  Vasc:    Pulses palpable, CFT minimally delayed  Neuro:    Light touch sensation intact to all sensory  nerve distributions without paresthesias  Derm:    Onychocryptosis medial hallux bilateral with very slight inflammation.  No drainage/purulence or cellulitis   MSK:    ROM, strength wnl without limitation, no pain on palpation noted.  Calf:    Neg for redness, swelling or tenderness    Assessment:  48 year old female with onychocryptosis medial hallux bilateral       Plan:  Discussed etiologies, anatomy and options  1.  Onychocryptosis medial hallux bilateral   -Regarding the ingrown nail, procedure options were discussed.  They elected to go with Partial permanent avulsion.  See procedure note for details.  Risks that were discussed include but are not limited to infection, wound healing complications, nerve irritation, recurrence of the ingrown nail and the need for further procedures.  Follow up 2 weeks for re-evaluation or sooner with acute issues.  Antibiotic:  None needed  -Rx for Silvadene for BID dressing changes    After discussing the procedure, as well as risks, complications and post-procedure instructions, informed consent was obtained.    Anesthesia:  4 cc's of  1% lidocaine plain each great toe    Procedure:  After adequate prep, and with anesthesia achieved, a tourni-cot was applied to the involved toe(and removed after bandage application) and  attention was directed to the medial border of the left and right great toes where the nail plate was freed from surrounding soft tissue.  The offending border was  using an English Anvil and then removed in total.  The base of the wound was explored and showed no necrotic tissue, purulence or debris.  Phenol was then applied to the base of the wound for 30s x 3, and sufficient isopropyl alcohol was used to irrigate the wound.  A clean dressing was applied loosely to prevent vascular insult.  The patient tolerated the procedure well without complications.    Post-procedural instructions were dispensed and discussed with the patient.  All questions  were answered.           Follow up:  2 weeks or sooner with acute issues      Patient's medical history was reviewed today    Body mass index is 37.77 kg/m .  Weight management plan: Patient was referred to their PCP to discuss a diet and exercise plan.        Levar Beth DPM FACRegional [facility] FACFAOM  Podiatric Foot & Ankle Surgeon  [facility]  [phone]

## 2018-12-10 NOTE — Clinical Note
Joey lopezI saw Lala today for bilateral hallux ingrown nails.  She underwent partial permanent avulsions and will follow up in 2 weeks for wound checks.ThanksLevar

## 2018-12-31 ENCOUNTER — OFFICE VISIT (OUTPATIENT)
Dept: PODIATRY | Facility: CLINIC | Age: 48
End: 2018-12-31
Payer: COMMERCIAL

## 2018-12-31 VITALS
HEIGHT: 65 IN | SYSTOLIC BLOOD PRESSURE: 124 MMHG | WEIGHT: 227 LBS | DIASTOLIC BLOOD PRESSURE: 70 MMHG | BODY MASS INDEX: 37.82 KG/M2

## 2018-12-31 DIAGNOSIS — L03.032 PARONYCHIA OF FIFTH TOE OF LEFT FOOT: ICD-10-CM

## 2018-12-31 DIAGNOSIS — L60.0 ONYCHOCRYPTOSIS: Primary | ICD-10-CM

## 2018-12-31 PROCEDURE — 99213 OFFICE O/P EST LOW 20 MIN: CPT | Performed by: PODIATRIST

## 2018-12-31 ASSESSMENT — MIFFLIN-ST. JEOR: SCORE: 1650.55

## 2018-12-31 NOTE — PROGRESS NOTES
"Foot & Ankle Surgery   December 31, 2018    S:  Pt is seen today for evaluation of R great toe 3 weeks after bilateral hallux medial border P&A.  The left great toe is doing well, but the R medial border is  and she wanted it evaluated.  She is following post-procedure instructions, including twice-daily soaks and silvadene bandage applications.    Vitals:    12/31/18 0911   BP: 124/70   Weight: 103 kg (227 lb)   Height: 1.651 m (5' 5\")   '      ROS - Pos for CC.  Patient denies current nausea, vomiting, chills, fevers, belly pain, calf pain, chest pain or SOB.  Complete remainder of ROS it otherwise neg.    PE:  Gen:   No apparent distress  Eye:    Visual scanning without deficit  Ear:    Response to auditory stimuli wnl  Lung:    Non-labored breathing on RA noted  Abd:    NTND per patient report  Lymph:    Neg for pitting/non-pitting edema BLE  Vasc:    Pulses palpable, CFT minimally delayed  Neuro:    Light touch sensation intact to all sensory nerve distributions without paresthesias  Derm:    Medial hallux bilateral - mild inflammation, wnl for this stage post-procedure.  No obvious nail spicule or debris at base of wound R great toe with exploration.  No cellulitis   MSK:    ROM, strength wnl without limitation, no pain on palpation noted.  Calf:    Neg for redness, swelling or tenderness    Assessment:  48 year old female with unremarkable/healing medial hallux bilateral sp P&A      Plan:  Discussed etiologies, anatomy and options  1.  Medial hallux bilateral sp P&A, without concerning findings  -base of medial R hallux wound explored without any obvious concerns  -continue twice-daily soaks and silvadene until inflammation, drainage and discomfort have resolved  -discussed that a small number of people need to have an I&D if the wounds fail to heal.  Follow up in 2 weeks for 30m appt for I&D.  If the wounds are much improved/resolved at that time, ok to cancel    Follow up:  2 weeks or sooner " with acute issues      Body mass index is 37.77 kg/m .  Weight management plan: Patient was referred to their PCP to discuss a diet and exercise plan.         Levar Beth DPM FAC[facility] FACFAOM  Podiatric Foot & Ankle Surgeon  [facility]  [phone]

## 2019-01-15 ENCOUNTER — MYC MEDICAL ADVICE (OUTPATIENT)
Dept: PEDIATRICS | Facility: CLINIC | Age: 49
End: 2019-01-15

## 2019-01-15 DIAGNOSIS — L40.9 PSORIASIS OF SCALP: ICD-10-CM

## 2019-01-15 RX ORDER — FLUOCINONIDE TOPICAL SOLUTION USP, 0.05% 0.5 MG/ML
SOLUTION TOPICAL
Qty: 60 ML | Refills: 1 | Status: SHIPPED | OUTPATIENT
Start: 2019-01-15 | End: 2024-05-06

## 2019-01-15 NOTE — TELEPHONE ENCOUNTER
Use lidex twice daily, solution on scalp, ointment on elbows.    Prescription approved per [facility] Refill Protocol.    Next 5 appointments (look out 90 days)    Mar 06, 2019  8:10 AM CST  PHYSICAL with Heidy Jama MD  [facility] ([facility] [address] [facility] [address]  [place] [facility] [place] [address]  [phone]   Mar 07, 2019  7:00 AM CST  Return Visit with EDMOND Velez CNP  [facility] ([facility]) [address] [place] [address]. [facility] [id number] [phone]        Sameera Cespedes RN  Message handled by Nurse Triage.

## 2019-01-21 ENCOUNTER — MYC MEDICAL ADVICE (OUTPATIENT)
Dept: PEDIATRICS | Facility: CLINIC | Age: 49
End: 2019-01-21

## 2019-01-21 DIAGNOSIS — L40.9 PSORIASIS OF SCALP: ICD-10-CM

## 2019-02-19 ENCOUNTER — TRANSFERRED RECORDS (OUTPATIENT)
Dept: HEALTH INFORMATION MANAGEMENT | Facility: CLINIC | Age: 49
End: 2019-02-19

## 2019-03-06 ENCOUNTER — OFFICE VISIT (OUTPATIENT)
Dept: PEDIATRICS | Facility: CLINIC | Age: 49
End: 2019-03-06
Payer: COMMERCIAL

## 2019-03-06 VITALS
BODY MASS INDEX: 39.1 KG/M2 | HEIGHT: 65 IN | WEIGHT: 234.7 LBS | TEMPERATURE: 97.9 F | HEART RATE: 91 BPM | OXYGEN SATURATION: 97 % | SYSTOLIC BLOOD PRESSURE: 100 MMHG | DIASTOLIC BLOOD PRESSURE: 74 MMHG

## 2019-03-06 DIAGNOSIS — Z12.11 SCREEN FOR COLON CANCER: ICD-10-CM

## 2019-03-06 DIAGNOSIS — Z12.31 VISIT FOR SCREENING MAMMOGRAM: ICD-10-CM

## 2019-03-06 DIAGNOSIS — Z11.4 SCREENING FOR HIV (HUMAN IMMUNODEFICIENCY VIRUS): ICD-10-CM

## 2019-03-06 DIAGNOSIS — Z00.00 ENCOUNTER FOR ROUTINE ADULT HEALTH EXAMINATION WITHOUT ABNORMAL FINDINGS: Primary | ICD-10-CM

## 2019-03-06 DIAGNOSIS — E66.01 MORBID OBESITY (H): ICD-10-CM

## 2019-03-06 DIAGNOSIS — Z13.89 SCREENING FOR DIABETIC PERIPHERAL NEUROPATHY: ICD-10-CM

## 2019-03-06 LAB
ALBUMIN SERPL-MCNC: 4.3 G/DL (ref 3.4–5)
ALP SERPL-CCNC: 119 U/L (ref 40–150)
ALT SERPL W P-5'-P-CCNC: 52 U/L (ref 0–50)
ANION GAP SERPL CALCULATED.3IONS-SCNC: 4 MMOL/L (ref 3–14)
AST SERPL W P-5'-P-CCNC: 34 U/L (ref 0–45)
BILIRUB SERPL-MCNC: 0.5 MG/DL (ref 0.2–1.3)
BUN SERPL-MCNC: 20 MG/DL (ref 7–30)
CALCIUM SERPL-MCNC: 9.3 MG/DL (ref 8.5–10.1)
CHLORIDE SERPL-SCNC: 103 MMOL/L (ref 94–109)
CO2 SERPL-SCNC: 31 MMOL/L (ref 20–32)
CREAT SERPL-MCNC: 0.59 MG/DL (ref 0.52–1.04)
GFR SERPL CREATININE-BSD FRML MDRD: >90 ML/MIN/{1.73_M2}
GLUCOSE SERPL-MCNC: 175 MG/DL (ref 70–99)
HBA1C MFR BLD: 9.5 % (ref 0–5.6)
POTASSIUM SERPL-SCNC: 3.9 MMOL/L (ref 3.4–5.3)
PROT SERPL-MCNC: 8 G/DL (ref 6.8–8.8)
SODIUM SERPL-SCNC: 138 MMOL/L (ref 133–144)

## 2019-03-06 PROCEDURE — 99207 C FOOT EXAM  NO CHARGE: CPT | Mod: 25 | Performed by: INTERNAL MEDICINE

## 2019-03-06 PROCEDURE — 87389 HIV-1 AG W/HIV-1&-2 AB AG IA: CPT | Performed by: INTERNAL MEDICINE

## 2019-03-06 PROCEDURE — 36415 COLL VENOUS BLD VENIPUNCTURE: CPT | Performed by: INTERNAL MEDICINE

## 2019-03-06 PROCEDURE — 99213 OFFICE O/P EST LOW 20 MIN: CPT | Mod: 25 | Performed by: INTERNAL MEDICINE

## 2019-03-06 PROCEDURE — 80053 COMPREHEN METABOLIC PANEL: CPT | Performed by: INTERNAL MEDICINE

## 2019-03-06 PROCEDURE — 99396 PREV VISIT EST AGE 40-64: CPT | Performed by: INTERNAL MEDICINE

## 2019-03-06 PROCEDURE — 83036 HEMOGLOBIN GLYCOSYLATED A1C: CPT | Performed by: INTERNAL MEDICINE

## 2019-03-06 ASSESSMENT — ENCOUNTER SYMPTOMS
NERVOUS/ANXIOUS: 0
DYSURIA: 0
ABDOMINAL PAIN: 0
SHORTNESS OF BREATH: 0
PARESTHESIAS: 0
DIARRHEA: 0
CONSTIPATION: 0
PALPITATIONS: 0
HEARTBURN: 0
COUGH: 0
HEADACHES: 0
NAUSEA: 0
SORE THROAT: 0
ARTHRALGIAS: 1
FREQUENCY: 0
HEMATURIA: 0
MYALGIAS: 0
BREAST MASS: 0
WEAKNESS: 0
JOINT SWELLING: 1
DIZZINESS: 0
HEMATOCHEZIA: 0
EYE PAIN: 0
CHILLS: 0
FEVER: 0

## 2019-03-06 ASSESSMENT — MIFFLIN-ST. JEOR: SCORE: 1685.47

## 2019-03-06 NOTE — PROGRESS NOTES
SUBJECTIVE:   CC: Lala George is an 48 year old woman who presents for preventive health visit.     Physical   Annual:     Getting at least 3 servings of Calcium per day:  Yes    Bi-annual eye exam:  Yes    Dental care twice a year:  Yes    Sleep apnea or symptoms of sleep apnea:  Sleep apnea    Diet:  Regular (no restrictions)    Frequency of exercise:  1 day/week    Duration of exercise:  15-30 minutes    Taking medications regularly:  Yes    Medication side effects:  None    Additional concerns today:  No    PHQ-2 Total Score: 0      Diabetes Follow-up    Patient is checking blood sugars: four times daily.    Blood sugar testing frequency justification: Adjustment of medication(s)  Results are as follows:         am - 152         lunchtime -          suppertime -          bedtime - 180    Diabetic concerns: None     Symptoms of hypoglycemia (low blood sugar): couple times     Paresthesias (numbness or burning in feet) or sores: No     Date of last diabetic eye exam: 5/7/18    Diabetes Management Resources    Hyperlipidemia Follow-Up      Rate your low fat/cholesterol diet?: not monitoring fat    Taking statin?  Yes, no muscle aches from statin    Other lipid medications/supplements?:  none    Hypertension Follow-up      Outpatient blood pressures are not being checked.    Low Salt Diet: low salt    BP Readings from Last 2 Encounters:   03/06/19 100/74   12/31/18 124/70     Hemoglobin A1C (%)   Date Value   12/05/2018 11.9 (H)   07/25/2018 11.4 (H)     LDL Cholesterol Calculated (mg/dL)   Date Value   02/21/2018 84   04/10/2017 66     Asthma Follow-Up    Was ACT completed today?    Yes    ACT Total Scores 12/5/2018   ACT TOTAL SCORE -   ASTHMA ER VISITS -   ASTHMA HOSPITALIZATIONS -   ACT TOTAL SCORE (Goal Greater than or Equal to 20) 23   In the past 12 months, how many times did you visit the emergency room for your asthma without being admitted to the hospital? 0   In the past 12 months, how many times were  you hospitalized overnight because of your asthma? 0       Recent asthma triggers that patient is dealing with: cold winter air        Today's PHQ-2 Score:   PHQ-2 ( 1999 [employer]) 3/6/2019   Q1: Little interest or pleasure in doing things 0   Q2: Feeling down, depressed or hopeless 0   PHQ-2 Score 0   Q1: Little interest or pleasure in doing things Not at all   Q2: Feeling down, depressed or hopeless Not at all   PHQ-2 Score 0       Abuse: Current or Past(Physical, Sexual or Emotional)- No  Do you feel safe in your environment? Yes    Social History     Tobacco Use     Smoking status: Never Smoker     Smokeless tobacco: Never Used   Substance Use Topics     Alcohol use: Yes     Comment: rare--one to two per week     Alcohol Use 3/6/2019   If you drink alcohol do you typically have greater than 3 drinks per day OR greater than 7 drinks per week? No       Reviewed orders with patient.  Reviewed health maintenance and updated orders accordingly - Yes  Labs reviewed in EPIC  BP Readings from Last 3 Encounters:   03/06/19 100/74   12/31/18 124/70   12/10/18 118/70    Wt Readings from Last 3 Encounters:   03/06/19 106.5 kg (234 lb 11.2 oz)   12/31/18 103 kg (227 lb)   12/10/18 103 kg (227 lb)                    Mammogram Screening: Patient over age 50, mutual decision to screen reflected in health maintenance.    Pertinent mammograms are reviewed under the imaging tab.  History of abnormal Pap smear:   NO - age 30- 65 PAP every 3 years recommended  Last 3 Pap Results:   PAP (no units)   Date Value   02/21/2018 NIL   07/14/2014 NIL   06/22/2011 NIL     PAP / HPV Latest Ref Rng & Units 2/21/2018 7/14/2014 6/22/2011   PAP - NIL NIL NIL   HPV 16 DNA NEG:Negative Negative - -   HPV 18 DNA NEG:Negative Negative - -   OTHER HR HPV NEG:Negative Negative - -     Reviewed and updated as needed this visit by clinical staff  Tobacco  Allergies  Meds  Med Hx  Surg Hx  Fam Hx  Soc Hx        Reviewed and updated as needed this  "visit by Provider            Review of Systems   Constitutional: Negative for chills and fever.   HENT: Negative for congestion, ear pain, hearing loss and sore throat.    Eyes: Negative for pain and visual disturbance.   Respiratory: Negative for cough and shortness of breath.    Cardiovascular: Negative for chest pain, palpitations and peripheral edema.   Gastrointestinal: Negative for abdominal pain, constipation, diarrhea, heartburn, hematochezia and nausea.   Breasts:  Negative for tenderness, breast mass and discharge.   Genitourinary: Negative for dysuria, frequency, genital sores, hematuria, pelvic pain, urgency, vaginal bleeding and vaginal discharge.   Musculoskeletal: Positive for arthralgias and joint swelling. Negative for myalgias.   Skin: Negative for rash.   Neurological: Negative for dizziness, weakness, headaches and paresthesias.   Psychiatric/Behavioral: Negative for mood changes. The patient is not nervous/anxious.      Fingers and hands hurt sometimes.      OBJECTIVE:   /74 (Cuff Size: Adult Large)   Pulse 91   Temp 97.9  F (36.6  C) (Oral)   Ht 1.651 m (5' 5\")   Wt 106.5 kg (234 lb 11.2 oz)   LMP 01/18/2013   SpO2 97%   BMI 39.06 kg/m    Physical Exam  GENERAL: healthy, alert and no distress  EYES: Eyes grossly normal to inspection, PERRL and conjunctivae and sclerae normal  HENT: ear canals and TM's normal, nose and mouth without ulcers or lesions  NECK: no adenopathy, no asymmetry, masses, or scars and thyroid normal to palpation  RESP: lungs clear to auscultation - no rales, rhonchi or wheezes  BREAST: normal without masses, tenderness or nipple discharge and no palpable axillary masses or adenopathy  CV: regular rate and rhythm, normal S1 S2, no S3 or S4, no murmur, click or rub, no peripheral edema and peripheral pulses strong  ABDOMEN: soft, nontender, no hepatosplenomegaly, no masses and bowel sounds normal  MS: no gross musculoskeletal defects noted, no edema  SKIN: no " suspicious lesions or rashes  NEURO: Normal strength and tone, mentation intact and speech normal  PSYCH: mentation appears normal, affect normal/bright  Diabetic foot: normal DP/PT pulses, no ulcerations. Sensation grossly normal.     Diagnostic Test Results:  Results for orders placed or performed in visit on 03/06/19 (from the past 24 hour(s))   Hemoglobin A1c   Result Value Ref Range    Hemoglobin A1C 9.5 (H) 0 - 5.6 %   Comprehensive metabolic panel (BMP + Alb, Alk Phos, ALT, AST, Total. Bili, TP)   Result Value Ref Range    Sodium 138 133 - 144 mmol/L    Potassium 3.9 3.4 - 5.3 mmol/L    Chloride 103 94 - 109 mmol/L    Carbon Dioxide 31 20 - 32 mmol/L    Anion Gap 4 3 - 14 mmol/L    Glucose 175 (H) 70 - 99 mg/dL    Urea Nitrogen 20 7 - 30 mg/dL    Creatinine 0.59 0.52 - 1.04 mg/dL    GFR Estimate >90 >60 mL/min/[1.73_m2]    GFR Estimate If Black >90 >60 mL/min/[1.73_m2]    Calcium 9.3 8.5 - 10.1 mg/dL    Bilirubin Total 0.5 0.2 - 1.3 mg/dL    Albumin 4.3 3.4 - 5.0 g/dL    Protein Total 8.0 6.8 - 8.8 g/dL    Alkaline Phosphatase 119 40 - 150 U/L    ALT 52 (H) 0 - 50 U/L    AST 34 0 - 45 U/L       ASSESSMENT/PLAN:   1. Encounter for gynecological examination without abnormal finding      2. Uncontrolled type 2 diabetes mellitus without complication, with long-term current use of insulin (H)  Meeting with endo tomorrow. Await A1C today. Do suspect her A1C will be improved, but not at goal.   - Lipid panel reflex to direct LDL Fasting; Future  - Albumin Random Urine Quantitative with Creat Ratio; Future  - Hemoglobin A1c  - Comprehensive metabolic panel (BMP + Alb, Alk Phos, ALT, AST, Total. Bili, TP)  - MENTAL HEALTH REFERRAL  - Adult; Outpatient Treatment; Individual/Couples/Family/Group Therapy/Health Psychology; Other: [facility] [phone]; We will contact you to schedule the appointment or please call with any questi...    3. obesity (H)  Discussed seeing a therapist to help her get  "to the point where she is ready to make changes to lose weight.  - MENTAL HEALTH REFERRAL  - Adult; Outpatient Treatment; Individual/Couples/Family/Group Therapy/Health Psychology; Other: [facility] [phone]; We will contact you to schedule the appointment or please call with any questi...    4. Screening for diabetic peripheral neuropathy    - FOOT EXAM  NO CHARGE [42845.055]    5. Screening for HIV (human immunodeficiency virus)    - HIV Screening    6. Visit for screening mammogram    - MA SCREENING DIGITAL BILAT - Future  (s+30); Future    7. Screen for colon cancer    - GASTROENTEROLOGY ADULT REF PROCEDURE ONLY [facility] [phone]; [facility]    COUNSELING:  Reviewed preventive health counseling, as reflected in patient instructions    BP Readings from Last 1 Encounters:   03/06/19 100/74     Estimated body mass index is 39.06 kg/m  as calculated from the following:    Height as of this encounter: 1.651 m (5' 5\").    Weight as of this encounter: 106.5 kg (234 lb 11.2 oz).      Weight management plan: Discussed healthy diet and exercise guidelines     reports that  has never smoked. she has never used smokeless tobacco.      Counseling Resources:  ATP IV Guidelines  Pooled Cohorts Equation Calculator  Breast Cancer Risk Calculator  FRAX Risk Assessment  ICSI Preventive Guidelines  Dietary Guidelines for Americans, 2010  USDA's MyPlate  ASA Prophylaxis  Lung CA Screening    Heidy Jama MD  [facility] [place]  "

## 2019-03-06 NOTE — LETTER
My Asthma Action Plan  Name: Lala George   YOB: 1970  Date: 3/6/2019   My doctor: Heidy Jama MD   My clinic: [facility]        My Control Medicine: Beclomethasone (QVar) -  40 mcg 2 puffs twice daily  My Rescue Medicine: Albuterol (Proair/Ventolin/Proventil) inhaler as needed   My Asthma Severity: mild persistent  Avoid your asthma triggers: patient is aware  cold air            GREEN ZONE   Good Control    I feel good    No cough or wheeze    Can work, sleep and play without asthma symptoms       Take your asthma control medicine every day.     1. If exercise triggers your asthma, take your rescue medication    15 minutes before exercise or sports, and    During exercise if you have asthma symptoms  2. Spacer to use with inhaler: If you have a spacer, make sure to use it with your inhaler             YELLOW ZONE Getting Worse  I have ANY of these:    I do not feel good    Cough or wheeze    Chest feels tight    Wake up at night   1. Keep taking your Green Zone medications  2. Start taking your rescue medicine:    every 20 minutes for up to 1 hour. Then every 4 hours for 24-48 hours.  3. If you stay in the Yellow Zone for more than 12-24 hours, contact your doctor.  4. If you do not return to the Green Zone in 12-24 hours or you get worse, start taking your oral steroid medicine if prescribed by your provider.           RED ZONE Medical Alert - Get Help  I have ANY of these:    I feel awful    Medicine is not helping    Breathing getting harder    Trouble walking or talking    Nose opens wide to breathe       1. Take your rescue medicine NOW  2. If your provider has prescribed an oral steroid medicine, start taking it NOW  3. Call your doctor NOW  4. If you are still in the Red Zone after 20 minutes and you have not reached your doctor:    Take your rescue medicine again and    Call 911 or go to the emergency room right away    See your regular doctor within 2 weeks of an  Emergency Room or Urgent Care visit for follow-up treatment.          Annual Reminders:  Meet with Asthma Educator,  Flu Shot in the Fall, consider Pneumonia Vaccination for patients with asthma (aged 19 and older).    Pharmacy:    [facility]  [id number] - [place] - [address] AT [facility]  [facility]IV[facility] - [facility] [address]  [facility] - [address] [facility] [place] - [address] [facility]                       Asthma Triggers  How To Control Things That Make Your Asthma Worse    Triggers are things that make your asthma worse.  Look at the list below to help you find your triggers and what you can do about them.  You can help prevent asthma flare-ups by staying away from your triggers.      Trigger                                                          What you can do   Cigarette Smoke  Tobacco smoke can make asthma worse. Do not allow smoking in your home, car or around you.  Be sure no one smokes at a child s day care or school.  If you smoke, ask your health care provider for ways to help you quit.  Ask family members to quit too.  Ask your health care provider for a referral to Quit Plan to help you quit smoking, or call 6-013-424-PLAN.     Colds, Flu, Bronchitis  These are common triggers of asthma. Wash your hands often.  Don t touch your eyes, nose or mouth.  Get a flu shot every year.     Dust Mites  These are tiny bugs that live in cloth or carpet. They are too small to see. Wash sheets and blankets in hot water every week.   Encase pillows and mattress in dust mite proof covers.  Avoid having carpet if you can. If you have carpet, vacuum weekly.   Use a dust mask and HEPA vacuum.   Pollen and Outdoor Mold  Some people are allergic to trees, grass, or weed pollen, or molds. Try to keep your windows closed.  Limit time out doors when pollen count is high.    Ask you health care provider about taking medicine during allergy season.     Animal Dander  Some people are allergic to skin flakes, urine or saliva from pets with fur or feathers. Keep pets with fur or feathers out of your home.    If you can t keep the pet outdoors, then keep the pet out of your bedroom.  Keep the bedroom door closed.  Keep pets off cloth furniture and away from stuffed toys.     Mice, Rats, and Cockroaches  Some people are allergic to the waste from these pests.   Cover food and garbage.  Clean up spills and food crumbs.  Store grease in the refrigerator.   Keep food out of the bedroom.   Indoor Mold  This can be a trigger if your home has high moisture. Fix leaking faucets, pipes, or other sources of water.   Clean moldy surfaces.  Dehumidify basement if it is damp and smelly.   Smoke, Strong Odors, and Sprays  These can reduce air quality. Stay away from strong odors and sprays, such as perfume, powder, hair spray, paints, smoke incense, paint, cleaning products, candles and new carpet.   Exercise or Sports  Some people with asthma have this trigger. Be active!  Ask your doctor about taking medicine before sports or exercise to prevent symptoms.    Warm up for 5-10 minutes before and after sports or exercise.     Other Triggers of Asthma  Cold air:  Cover your nose and mouth with a scarf.  Sometimes laughing or crying can be a trigger.  Some medicines and food can trigger asthma.

## 2019-03-06 NOTE — PATIENT INSTRUCTIONS
Preventive Health Recommendations  Female Ages 50 - 64    Yearly exam: See your health care provider every year in order to  o Review health changes.   o Discuss preventive care.    o Review your medicines if your doctor has prescribed any.      Get a Pap test every three years (unless you have an abnormal result and your provider advises testing more often).    If you get Pap tests with HPV test, you only need to test every 5 years, unless you have an abnormal result.     You do not need a Pap test if your uterus was removed (hysterectomy) and you have not had cancer.    You should be tested each year for STDs (sexually transmitted diseases) if you're at risk.     Have a mammogram every 1 to 2 years.    Have a colonoscopy at age 50, or have a yearly FIT test (stool test). These exams screen for colon cancer.      Have a cholesterol test every 5 years, or more often if advised.    Have a diabetes test (fasting glucose) every three years. If you are at risk for diabetes, you should have this test more often.     If you are at risk for osteoporosis (brittle bone disease), think about having a bone density scan (DEXA).    Shots: Get a flu shot each year. Get a tetanus shot every 10 years.    Nutrition:     Eat at least 5 servings of fruits and vegetables each day.    Eat whole-grain bread, whole-wheat pasta and brown rice instead of white grains and rice.    Get adequate Calcium and Vitamin D.     Lifestyle    Exercise at least 150 minutes a week (30 minutes a day, 5 days a week). This will help you control your weight and prevent disease.    Limit alcohol to one drink per day.    No smoking.     Wear sunscreen to prevent skin cancer.     See your dentist every six months for an exam and cleaning.    See your eye doctor every 1 to 2 years.      I'm going to place an order for your colonoscopy. You should get a call to schedule this. If you don't hear from our colonoscopy schedulers in 1-2 days, you can call the  [facility]:   Appt. Line [phone].    Ask tomorrow about ozempic or trulicity to help with weight loss, or other weight loss ideas. I can also do a referral to weight management for this if you or she prefers.     [facility] [phone]) will be calling you within 1-2 days to assist you in scheduling an outpatient mental health visit.

## 2019-03-07 ENCOUNTER — OFFICE VISIT (OUTPATIENT)
Dept: ENDOCRINOLOGY | Facility: CLINIC | Age: 49
End: 2019-03-07
Payer: COMMERCIAL

## 2019-03-07 VITALS
RESPIRATION RATE: 12 BRPM | HEART RATE: 91 BPM | WEIGHT: 238.5 LBS | BODY MASS INDEX: 39.69 KG/M2 | TEMPERATURE: 97.8 F | SYSTOLIC BLOOD PRESSURE: 119 MMHG | DIASTOLIC BLOOD PRESSURE: 74 MMHG

## 2019-03-07 LAB — HIV 1+2 AB+HIV1 P24 AG SERPL QL IA: NONREACTIVE

## 2019-03-07 PROCEDURE — 99214 OFFICE O/P EST MOD 30 MIN: CPT | Performed by: CLINICAL NURSE SPECIALIST

## 2019-03-07 ASSESSMENT — ASTHMA QUESTIONNAIRES: ACT_TOTALSCORE: 22

## 2019-03-07 NOTE — LETTER
3/7/2019         RE: Lala George  [address]  [facility] [id number]        Dear Colleague,    Thank you for referring your patient, Lala George, to the [facility]. Please see a copy of my visit note below.    Name: Lala George  Seen at the request of Heidy Jama for Diabetes (2018).  HPI:  Lala George is a 48 year old female who presents for the management of:    1. Type 2 DM:  Originally diagnosed: 2014.  Glucose slightly elevated 109-113 the year prior to diagnosis.  Glucose at the time of diagnosis was 317.    Current Regimen: Metformin XR 2000 mg/day, Tresiba 60 units q hs, Novolog 20 units tid +1:25>150.    Not consistent about taking Novolog - forgets.    Initially treated with Metformin, Lantus insulin was added a short time after diagnosis. Lantus was later changed to Basaglar due to insurance, then changed to Tresiba.  Humalog then added.  Was briefly treated with glipizide in , for about 2-3 months, discontinued due to ineffectiveness  Trulicity was added 2017, this was later changed to Tanzeum due to insurance formulary. Tanzeum no longer available.      BS checks: Continuous, Sandi personal CGM  Sandi:  Average B (improved from 298).  77% above target range.  23% in target range.  0% below target range.      Works night shift 10 pm to 6 am, Tuesday - .    Complications:   Diabetes Complications  Description / Detail    Diabetic Retinopathy  No retinopathy, last eye exam 2018   CAD / PAD  No   Neuropathy  No   Nephropathy / Microalbuminuria  Elevated urine microalbumin x 1   Gastroparesis  No   Hypoglycemia Unawareness  No     2. Hypertension: Blood Pressure today:   BP Readings from Last 3 Encounters:   19 119/74   19 100/74   18 124/70   .  Blood pressure medications include losartan 100 mg qd, HCTZ 25 mg qd.  .    3. Hyperlipidemia: Takes atorvastatin 10 mg qd for lipid control.  Denies muscle aches of  pains.       4. Prevention:  Flu Shot- 8/2018  Pneumovax- 3/2/2015  Opthalmology-Yes: 5/2018  Dental-recommend semiannually  ASA-No  Smoking- No  PMH/PSH:  Past Medical History:   Diagnosis Date     BENIGN HYPERTENSION 2/15/2006     Mild persistent asthma      Past Surgical History:   Procedure Laterality Date     NO HISTORY OF SURGERY       Family Hx:  Family History   Problem Relation Age of Onset     Breast Cancer Mother 30        first diagnosed in her early 30's     Hypertension Mother      Diabetes Mother      C.A.ZACH. Mother         CHF 50s ,pacemaker late 50s, CABG 60s     C.A.ZACH. Maternal Aunt         60s     Glaucoma No family hx of      Macular Degeneration No family hx of      Thyroid disease: No         DM2: Yes: mother and one sibling         Autoimmune: DM1, SLE, RA, Vitiligo No    Social Hx:  Social History     Socioeconomic History     Marital status: Single     Spouse name: Not on file     Number of children: Not on file     Years of education: Not on file     Highest education level: Not on file   Occupational History     Employer: OTHER     Comment: part time, [employer]   Social Needs     Financial resource strain: Not on file     Food insecurity:     Worry: Not on file     Inability: Not on file     Transportation needs:     Medical: Not on file     Non-medical: Not on file   Tobacco Use     Smoking status: Never Smoker     Smokeless tobacco: Never Used   Substance and Sexual Activity     Alcohol use: Yes     Comment: rare--one to two per week     Drug use: No     Sexual activity: No   Lifestyle     Physical activity:     Days per week: Not on file     Minutes per session: Not on file     Stress: Not on file   Relationships     Social connections:     Talks on phone: Not on file     Gets together: Not on file     Attends Christian service: Not on file     Active member of club or organization: Not on file     Attends meetings of clubs or organizations: Not on file     Relationship status: Not on  file     Intimate partner violence:     Fear of current or ex partner: Not on file     Emotionally abused: Not on file     Physically abused: Not on file     Forced sexual activity: Not on file   Other Topics Concern     Parent/sibling w/ CABG, MI or angioplasty before 65F 55M? Not Asked   Social History Narrative     Not on file          MEDICATIONS:  has a current medication list which includes the following prescription(s): albuterol, atorvastatin, blood glucose monitoring, blood glucose monitoring, blood glucose, blood glucose, freestyle estiven 14 day reader, freestyle estiven 14 day sensor, fluocinonide, fluocinonide, hydrochlorothiazide, insulin aspart, insulin degludec, insulin pen needle, losartan, metformin, and sertraline.    ROS     ROS: 10 point ROS neg other than the symptoms noted above in the HPI.    Physical Exam   VS: /74 (BP Location: Right arm, Patient Position: Chair, Cuff Size: Adult Large)   Pulse 91   Temp 97.8  F (36.6  C) (Oral)   Resp 12   Wt 108.2 kg (238 lb 8 oz)   LMP 01/18/2013   Breastfeeding? No   BMI 39.69 kg/m     GENERAL: AXOX3, NAD, well dressed, answering questions appropriately, appears stated age.  HEENT:  no exophthalmos, no proptosis, no lig lag, no retraction  CV: RRR, no rubs, gallops, no murmurs  LUNGS: CTAB, no wheezes, rales, or rhonchi  EXTREMITIES: no edema  NEUROLOGY: CN grossly intact, no tremors  MSK: grossly intact    LABS:  A1c:   Component    Latest Ref Rng & Units 7/25/2018 12/5/2018 3/6/2019   Hemoglobin A1C    0 - 5.6 % 11.4 (H) 11.9 (H) 9.5 (H)     Basic Metabolic Panel:  !COMPREHENSIVE Latest Ref Rng & Units 3/6/2019   SODIUM 133 - 144 mmol/L 138   POTASSIUM 3.4 - 5.3 mmol/L 3.9   CHLORIDE 94 - 109 mmol/L 103   BUN 7 - 30 mg/dL 20   Creatinine 0.52 - 1.04 mg/dL 0.59   Glucose 70 - 99 mg/dL 175 (H)   ANION GAP 3 - 14 mmol/L 4   CALCIUM 8.5 - 10.1 mg/dL 9.3   ALBUMIN 3.4 - 5.0 g/dL 4.3     LFTS/Cholesterol Panel:  !LIPID/HEPATIC Latest Ref Rng &  Units 1/24/2018 2/21/2018   CHOLESTEROL <200 mg/dL  174   TRIGLYCERIDES <150 mg/dL  138   HDL CHOLESTEROL >49 mg/dL  62   LDL CHOLESTEROL, CALCULATED <100 mg/dL  84   VLDL-CHOLESTEROL 0 - 30 mg/dL     NON HDL CHOLESTEROL <130 mg/dL  112   CHOLESTEROL/HDL RATIO 0.0 - 5.0     AST 0 - 45 U/L 28    ALT 0 - 50 U/L 37      Thyroid Function:   !THYROID Latest Ref Rng & Units 1/24/2018   TSH 0.40 - 4.00 mU/L 2.40     Urine MicroAlbumin:  Component    Latest Ref Rng & Units 1/24/2018   Creatinine Urine    mg/dL 41   Albumin Urine mg/L     6   Albumin Urine mg/g Cr    0 - 25 mg/g Cr 14.99       Vitamin D:    All pertinent notes, labs, and images personally reviewed by me.     A/P  Ms.Shari George is a 48 year old here for the evaluation/management of diabetes:    1. DM2 - Uncontrolled.  Start Ozempic 0.25 mg weekly x 4 weeks then increase to 0.5 mg weekly  Increase Tresiba to 70 units qd.  Increase Novolog to 25 units tid + 1:25>150.  Take Novolog consistently before each meal.  Continue 14-day Sandi  Previously discussed insulin pump therapy - demo Omnipod was provided - is interested.  Follow up with diabetes ed for pre-pump education.  There is some variability among people, most will usually develop symptoms suggestive of hypoglycemia when blood glucose levels are lowered to the mid 60's. The first set of symptoms are called adrenergic. Patients may experience any of the following nervousness, sweating, intense hunger, trembling, weakness, palpitations, and difficulty speaking.   The acute management of hypoglycemia involves the rapid delivery of a source of easily absorbed sugar. Regular soda, juice, lifesavers, table sugar, are good options. 15 grams of glucose is the dose that is given, followed by an assessment of symptoms and a blood glucose check if possible. If after 10 minutes there is no improvement, another 10-15 grams should be given. This can be repeated up to three times. The equivalency of 10-15 grams of  glucose (approximate servings) are: 3-5 hard candies, 3 teaspoons of sugar, or 1/2 cup of regular soda or juice.    2. Hypertension - Controlled.    3. Hyperlipidemia - On statin therapy.  Due for lipid panel - fasting lab appointment scheduled for this Saturday.    Labs ordered today:   Orders Placed This Encounter   Procedures     DIABETES EDUCATOR REFERRAL       Radiology/Consults ordered today: DIABETES EDUCATOR REFERRAL    All questions were answered.  The patient indicates understanding of the above issues and agrees with the plan set forth.  Total face to face time greater than or equal to 25 minutes.       Follow-up:  3 months in clinic with me    Idalia Boudreaux NP  Endocrinology  [facility]  CC: Heidy Jama      Again, thank you for allowing me to participate in the care of your patient.        Sincerely,        EDMOND Velez CNP

## 2019-03-07 NOTE — PROGRESS NOTES
Name: Lala George  Seen at the request of Heidy Jama for Diabetes (2018).  HPI:  Lala George is a 48 year old female who presents for the management of:    1. Type 2 DM:  Originally diagnosed: 2014.  Glucose slightly elevated 109-113 the year prior to diagnosis.  Glucose at the time of diagnosis was 317.    Current Regimen: Metformin XR 2000 mg/day, Tresiba 60 units q hs, Novolog 20 units tid +1:25>150.    Not consistent about taking Novolog - forgets.    Initially treated with Metformin, Lantus insulin was added a short time after diagnosis. Lantus was later changed to Basaglar due to insurance, then changed to Tresiba.  Humalog then added.  Was briefly treated with glipizide in , for about 2-3 months, discontinued due to ineffectiveness  Trulicity was added 2017, this was later changed to Tanzeum due to insurance formulary. Tanzeum no longer available.      BS checks: Continuous, Sandi personal CGM  Sandi:  Average B (improved from 298).  77% above target range.  23% in target range.  0% below target range.      Works night shift 10 pm to 6 am, Tuesday - .    Complications:   Diabetes Complications  Description / Detail    Diabetic Retinopathy  No retinopathy, last eye exam 2018   CAD / PAD  No   Neuropathy  No   Nephropathy / Microalbuminuria  Elevated urine microalbumin x 1   Gastroparesis  No   Hypoglycemia Unawareness  No     2. Hypertension: Blood Pressure today:   BP Readings from Last 3 Encounters:   19 119/74   19 100/74   18 124/70   .  Blood pressure medications include losartan 100 mg qd, HCTZ 25 mg qd.  .    3. Hyperlipidemia: Takes atorvastatin 10 mg qd for lipid control.  Denies muscle aches of pains.       4. Prevention:  Flu Shot- 2018  Pneumovax- 3/2/2015  Opthalmology-Yes: 2018  Dental-recommend semiannually  ASA-No  Smoking- No  PMH/PSH:  Past Medical History:   Diagnosis Date     BENIGN HYPERTENSION 2/15/2006     Mild persistent  asthma      Past Surgical History:   Procedure Laterality Date     NO HISTORY OF SURGERY       Family Hx:  Family History   Problem Relation Age of Onset     Breast Cancer Mother 30        first diagnosed in her early 30's     Hypertension Mother      Diabetes Mother      C.A.ZACH. Mother         CHF 50s ,pacemaker late 50s, CABG 60s     C.AANGEL. Maternal Aunt         60s     Glaucoma No family hx of      Macular Degeneration No family hx of      Thyroid disease: No         DM2: Yes: mother and one sibling         Autoimmune: DM1, SLE, RA, Vitiligo No    Social Hx:  Social History     Socioeconomic History     Marital status: Single     Spouse name: Not on file     Number of children: Not on file     Years of education: Not on file     Highest education level: Not on file   Occupational History     Employer: OTHER     Comment: part time, [employer]   Social Needs     Financial resource strain: Not on file     Food insecurity:     Worry: Not on file     Inability: Not on file     Transportation needs:     Medical: Not on file     Non-medical: Not on file   Tobacco Use     Smoking status: Never Smoker     Smokeless tobacco: Never Used   Substance and Sexual Activity     Alcohol use: Yes     Comment: rare--one to two per week     Drug use: No     Sexual activity: No   Lifestyle     Physical activity:     Days per week: Not on file     Minutes per session: Not on file     Stress: Not on file   Relationships     Social connections:     Talks on phone: Not on file     Gets together: Not on file     Attends Holiness service: Not on file     Active member of club or organization: Not on file     Attends meetings of clubs or organizations: Not on file     Relationship status: Not on file     Intimate partner violence:     Fear of current or ex partner: Not on file     Emotionally abused: Not on file     Physically abused: Not on file     Forced sexual activity: Not on file   Other Topics Concern     Parent/sibling w/ CABG, MI or  angioplasty before 65F 55M? Not Asked   Social History Narrative     Not on file          MEDICATIONS:  has a current medication list which includes the following prescription(s): albuterol, atorvastatin, blood glucose monitoring, blood glucose monitoring, blood glucose, blood glucose, freestyle estiven 14 day reader, freestyle estiven 14 day sensor, fluocinonide, fluocinonide, hydrochlorothiazide, insulin aspart, insulin degludec, insulin pen needle, losartan, metformin, and sertraline.    ROS     ROS: 10 point ROS neg other than the symptoms noted above in the HPI.    Physical Exam   VS: /74 (BP Location: Right arm, Patient Position: Chair, Cuff Size: Adult Large)   Pulse 91   Temp 97.8  F (36.6  C) (Oral)   Resp 12   Wt 108.2 kg (238 lb 8 oz)   LMP 01/18/2013   Breastfeeding? No   BMI 39.69 kg/m    GENERAL: AXOX3, NAD, well dressed, answering questions appropriately, appears stated age.  HEENT:  no exophthalmos, no proptosis, no lig lag, no retraction  CV: RRR, no rubs, gallops, no murmurs  LUNGS: CTAB, no wheezes, rales, or rhonchi  EXTREMITIES: no edema  NEUROLOGY: CN grossly intact, no tremors  MSK: grossly intact    LABS:  A1c:   Component    Latest Ref Rng & Units 7/25/2018 12/5/2018 3/6/2019   Hemoglobin A1C    0 - 5.6 % 11.4 (H) 11.9 (H) 9.5 (H)     Basic Metabolic Panel:  !COMPREHENSIVE Latest Ref Rng & Units 3/6/2019   SODIUM 133 - 144 mmol/L 138   POTASSIUM 3.4 - 5.3 mmol/L 3.9   CHLORIDE 94 - 109 mmol/L 103   BUN 7 - 30 mg/dL 20   Creatinine 0.52 - 1.04 mg/dL 0.59   Glucose 70 - 99 mg/dL 175 (H)   ANION GAP 3 - 14 mmol/L 4   CALCIUM 8.5 - 10.1 mg/dL 9.3   ALBUMIN 3.4 - 5.0 g/dL 4.3     LFTS/Cholesterol Panel:  !LIPID/HEPATIC Latest Ref Rng & Units 1/24/2018 2/21/2018   CHOLESTEROL <200 mg/dL  174   TRIGLYCERIDES <150 mg/dL  138   HDL CHOLESTEROL >49 mg/dL  62   LDL CHOLESTEROL, CALCULATED <100 mg/dL  84   VLDL-CHOLESTEROL 0 - 30 mg/dL     NON HDL CHOLESTEROL <130 mg/dL  112   CHOLESTEROL/HDL  RATIO 0.0 - 5.0     AST 0 - 45 U/L 28    ALT 0 - 50 U/L 37      Thyroid Function:   !THYROID Latest Ref Rng & Units 1/24/2018   TSH 0.40 - 4.00 mU/L 2.40     Urine MicroAlbumin:  Component    Latest Ref Rng & Units 1/24/2018   Creatinine Urine    mg/dL 41   Albumin Urine mg/L     6   Albumin Urine mg/g Cr    0 - 25 mg/g Cr 14.99       Vitamin D:    All pertinent notes, labs, and images personally reviewed by me.     A/P  Ms.Shari George is a 48 year old here for the evaluation/management of diabetes:    1. DM2 - Uncontrolled.  Start Ozempic 0.25 mg weekly x 4 weeks then increase to 0.5 mg weekly  Increase Tresiba to 70 units qd.  Increase Novolog to 25 units tid + 1:25>150.  Take Novolog consistently before each meal.  Continue 14-day Sandi  Previously discussed insulin pump therapy - demo Omnipod was provided - is interested.  Follow up with diabetes ed for pre-pump education.  There is some variability among people, most will usually develop symptoms suggestive of hypoglycemia when blood glucose levels are lowered to the mid 60's. The first set of symptoms are called adrenergic. Patients may experience any of the following nervousness, sweating, intense hunger, trembling, weakness, palpitations, and difficulty speaking.   The acute management of hypoglycemia involves the rapid delivery of a source of easily absorbed sugar. Regular soda, juice, lifesavers, table sugar, are good options. 15 grams of glucose is the dose that is given, followed by an assessment of symptoms and a blood glucose check if possible. If after 10 minutes there is no improvement, another 10-15 grams should be given. This can be repeated up to three times. The equivalency of 10-15 grams of glucose (approximate servings) are: 3-5 hard candies, 3 teaspoons of sugar, or 1/2 cup of regular soda or juice.    2. Hypertension - Controlled.    3. Hyperlipidemia - On statin therapy.  Due for lipid panel - fasting lab appointment scheduled for this  Saturday.    Labs ordered today:   Orders Placed This Encounter   Procedures     DIABETES EDUCATOR REFERRAL       Radiology/Consults ordered today: DIABETES EDUCATOR REFERRAL    All questions were answered.  The patient indicates understanding of the above issues and agrees with the plan set forth.  Total face to face time greater than or equal to 25 minutes.       Follow-up:  3 months in clinic with kobe Boudreaux NP  Endocrinology  [facility]  CC: Heidy Jama

## 2019-03-07 NOTE — PATIENT INSTRUCTIONS
In an effort to stay on schedule, please remember to check in for your next clinic appointment 15-20 minutes early.  This is necessary so your insulin pump/blood glucose meter or CGM can be uploaded and any labs can be done if necessary.  We appreciate your understanding.    Diabetes changes:  Start Ozempic 0.25 mg once weekly x 4 weeks then increase to 0.5 mg weekly.  Increase Tresiba to 70 units once daily  Increase Novolog to 25 units per meal + the correction 1 extra unit per 25 points above 150.    BG   151-200 - + 2 additional units Novolog (= 27 units before the meal)  201-250 - +4  Additional units (= 29 units)  251-300 - +6 additional units (= 31 units)  301-350 - +8 additional units (= 33 units)  Over 350 - + 10 additional units (= 35 units)      Try over the counter Valentin - fat blocker.    Weight loss resources:    My Fitness Pal    Calorie [facility] Move program - handouts     [email or web address]  [email or web address]    [employer] - webcast    Meal planning [place]     For your diabetes:  Schedule an appointment with diabetes ed for prepump education.    Your A1c did improve by 2%.   Your target A1c is less than 7.0%    Component    Latest Ref Rng & Units 7/25/2018 12/5/2018 3/6/2019   Hemoglobin A1C    0 - 5.6 % 11.4 (H) 11.9 (H) 9.5 (H)     Follow up with me in 3 months    Idalia Boudreaux NP  Endocrinology

## 2019-03-16 LAB
CHOLEST SERPL-MCNC: 134 MG/DL
CREAT UR-MCNC: 43 MG/DL
HDLC SERPL-MCNC: 49 MG/DL
LDLC SERPL CALC-MCNC: 54 MG/DL
MICROALBUMIN UR-MCNC: 5 MG/L
MICROALBUMIN/CREAT UR: 11.67 MG/G CR (ref 0–25)
NONHDLC SERPL-MCNC: 84 MG/DL
TRIGL SERPL-MCNC: 151 MG/DL

## 2019-03-16 PROCEDURE — 80061 LIPID PANEL: CPT | Performed by: INTERNAL MEDICINE

## 2019-03-16 PROCEDURE — 82043 UR ALBUMIN QUANTITATIVE: CPT | Performed by: INTERNAL MEDICINE

## 2019-03-16 PROCEDURE — 36415 COLL VENOUS BLD VENIPUNCTURE: CPT | Performed by: INTERNAL MEDICINE

## 2019-03-26 ENCOUNTER — HOSPITAL ENCOUNTER (OUTPATIENT)
Dept: MAMMOGRAPHY | Facility: CLINIC | Age: 49
Discharge: HOME | End: 2019-03-26
Attending: INTERNAL MEDICINE | Admitting: INTERNAL MEDICINE
Payer: COMMERCIAL

## 2019-03-26 DIAGNOSIS — Z12.31 VISIT FOR SCREENING MAMMOGRAM: ICD-10-CM

## 2019-03-26 PROCEDURE — 77063 BREAST TOMOSYNTHESIS BI: CPT

## 2019-05-03 ENCOUNTER — MYC MEDICAL ADVICE (OUTPATIENT)
Dept: ENDOCRINOLOGY | Facility: CLINIC | Age: 49
End: 2019-05-03

## 2019-05-03 ENCOUNTER — HOSPITAL ENCOUNTER (OUTPATIENT)
Facility: CLINIC | Age: 49
Discharge: HOME | End: 2019-05-03
Attending: INTERNAL MEDICINE | Admitting: INTERNAL MEDICINE
Payer: COMMERCIAL

## 2019-05-03 VITALS
RESPIRATION RATE: 12 BRPM | WEIGHT: 224 LBS | HEART RATE: 69 BPM | BODY MASS INDEX: 37.32 KG/M2 | HEIGHT: 65 IN | DIASTOLIC BLOOD PRESSURE: 76 MMHG | OXYGEN SATURATION: 93 % | SYSTOLIC BLOOD PRESSURE: 119 MMHG

## 2019-05-03 LAB
COLONOSCOPY: NORMAL
GLUCOSE BLDC GLUCOMTR-MCNC: 116 MG/DL (ref 70–99)

## 2019-05-03 PROCEDURE — 82962 GLUCOSE BLOOD TEST: CPT

## 2019-05-03 PROCEDURE — 45378 DIAGNOSTIC COLONOSCOPY: CPT | Performed by: INTERNAL MEDICINE

## 2019-05-03 PROCEDURE — 25000128 H RX IP 250 OP 636: Performed by: INTERNAL MEDICINE

## 2019-05-03 PROCEDURE — G0500 MOD SEDAT ENDO SERVICE >5YRS: HCPCS | Performed by: INTERNAL MEDICINE

## 2019-05-03 PROCEDURE — G0121 COLON CA SCRN NOT HI RSK IND: HCPCS | Performed by: INTERNAL MEDICINE

## 2019-05-03 RX ORDER — FLUMAZENIL 0.1 MG/ML
0.2 INJECTION, SOLUTION INTRAVENOUS
Status: DISCONTINUED | OUTPATIENT
Start: 2019-05-03 | End: 2019-05-03 | Stop reason: HOSPADM

## 2019-05-03 RX ORDER — LIDOCAINE 40 MG/G
CREAM TOPICAL
Status: DISCONTINUED | OUTPATIENT
Start: 2019-05-03 | End: 2019-05-03 | Stop reason: HOSPADM

## 2019-05-03 RX ORDER — ONDANSETRON 2 MG/ML
4 INJECTION INTRAMUSCULAR; INTRAVENOUS
Status: DISCONTINUED | OUTPATIENT
Start: 2019-05-03 | End: 2019-05-03 | Stop reason: HOSPADM

## 2019-05-03 RX ORDER — NALOXONE HYDROCHLORIDE 0.4 MG/ML
.1-.4 INJECTION, SOLUTION INTRAMUSCULAR; INTRAVENOUS; SUBCUTANEOUS
Status: DISCONTINUED | OUTPATIENT
Start: 2019-05-03 | End: 2019-05-03 | Stop reason: HOSPADM

## 2019-05-03 RX ORDER — ONDANSETRON 2 MG/ML
4 INJECTION INTRAMUSCULAR; INTRAVENOUS EVERY 6 HOURS PRN
Status: DISCONTINUED | OUTPATIENT
Start: 2019-05-03 | End: 2019-05-03 | Stop reason: HOSPADM

## 2019-05-03 RX ORDER — FENTANYL CITRATE 50 UG/ML
INJECTION, SOLUTION INTRAMUSCULAR; INTRAVENOUS PRN
Status: DISCONTINUED | OUTPATIENT
Start: 2019-05-03 | End: 2019-05-03 | Stop reason: HOSPADM

## 2019-05-03 RX ORDER — ONDANSETRON 4 MG/1
4 TABLET, ORALLY DISINTEGRATING ORAL EVERY 6 HOURS PRN
Status: DISCONTINUED | OUTPATIENT
Start: 2019-05-03 | End: 2019-05-03 | Stop reason: HOSPADM

## 2019-05-03 ASSESSMENT — MIFFLIN-ST. JEOR: SCORE: 1636.94

## 2019-05-03 NOTE — DISCHARGE INSTRUCTIONS
DIVERTICULOSIS  You have diverticulosis. This means that small pouches have formed in the wall of the colon (large intestine). Most often, this problem causes no symptoms. But the pouches in the colon are at risk for becoming infected or inflamed. When this happens, the condition is called diverticulitis.  The doctor will talk with you about how to manage your condition. Diet changes may be all that are needed to help control diverticulosis and prevent progression to diverticulitis. If you develop diverticulitis, other treatments will likely be needed.  HOME CARE  Medications: You may be told to take fiber supplements daily. Fiber adds bulk to the stool so that it passes through the colon more easily. Stool softeners may be recommended. You may also be given medications for pain relief. Be sure to take all medications as directed.  General Care:    Eat unprocessed foods that are high in fiber. Whole grains, fruits, and vegetables are good choices.    Drink 6 to 8 glasses of water daily.    Watch for changes in your bowel movements. Tell the doctor if you notice any changes.    Begin an exercise program. Ask your doctor how to get started.    Get plenty of rest and sleep.  FOLLOW UP as advised by the doctor or our staff. Regular visits may be needed to check on your health. Be sure to keep all your appointments.  GET PROMPT MEDICAL ATTENTION if any of the following occurs:    Fever of 100.6 F (38 C) or higher, or as directed by your healthcare provider    Severe cramps in the lower left side of the abdomen or pain that is getting progressively worse.    Tenderness in the lower left side of the abdomen or worsening pain throughout the abdomen    Diarrhea or constipation that does not improve within 24 hours    Nausea and vomiting    Bleeding from the rectum      9558-7937 [facility], [address] [place] [address], [place] [address]. All rights reserved. This information is not intended as a substitute for professional  medical care. Always follow your healthcare professional's instructions.    Eating a High-Fiber Diet  Fiber is what gives strength and structure to plants. Most grains, beans, vegetables, and fruits contain fiber. Foods rich in fiber are often low in calories and fat, and they fill you up more. They may also reduce your risks for certain health problems. To find out the amount of fiber in canned, packaged, or frozen foods, read the  Nutrition Facts  label. It tells you how much fiber is in a serving.      Types of Fiber and Their Benefits  There are two types of fiber: insoluble and soluble. They both aid digestion and help you maintain a healthy weight.  Insoluble fiber: This is found in whole grains, cereals, certain fruits and vegetables (such as apple skin, corn, and carrots). Insoluble fiber may prevent constipation and reduce the risk of certain types of cancer.   Soluble fiber: This type of fiber is in oats, beans, and certain fruits and vegetables (such as strawberries and peas). Soluble fiber can reduce cholesterol (which may help lower the risk of heart disease), and helps control blood sugar levels.  Look for High-Fiber Foods  Whole-grain breads and cereals: Try to eat 6-8 ounces a day. Include wheat and oat bran cereals, whole-wheat muffins or toast, and corn tortillas in your meals.  Fruits: Try to eat 2 cups a day. Apples, oranges, strawberries, pears, and bananas are good sources. (Note: Fruit juice is low in fiber.)  Vegetables: Try to eat 3 cups a day. Add asparagus, carrots, broccoli, peas, and corn to your meals.  Legumes (beans): One cup of cooked lentils gives you over 15 grams of fiber. Try navy beans, lentils, and chickpeas.  Seeds:  A small handful of seeds gives you about 3 grams of fiber. Try sunflower seeds.    Keep Track of Your Fiber  A healthy diet includes 31 grams of fiber a day if you have a 2,000-calorie diet. Keep track of how much fiber you eat. Start by reading food labels. Then  eat a variety of foods high in fiber. Ask your doctor about supplemental fiber products.            [id number] Leesa Heard, [address] [facility], [place] [address]. All rights reserved. This information is not intended as a substitute for professional medical care. Always follow your healthcare professional's instructions.    HIGH FIBER DIET  Fiber is present in all fruits, vegetables, cereals and grains. Fiber passes through the body undigested. A high fiber diet helps food move through the intestinal tract. The added bulk is helpful in preventing constipation. In people with diverticulosis it serves to clean out the pouches along the colon wall while preventing new ones from forming. A high fiber diet also reduces the risk of colon cancer, decreases blood cholesterol and prevents high blood sugar in people with diabetes.    The foods listed below are high in fiber and should be included in your diet. If you are not used to high fiber foods, start with 1 or 2 foods from this list. Every 3-4 days add a new one to your diet until you are eating 4 high fiber foods per day. This should give you 20-35 Gm of fiber/day. It is also important to drink a lot of water when you are on this diet (6-8 glasses a day). Water causes the fiber to swell and increases the benefit.    FOODS HIGH IN DIETARY FIBER:  BREADS: Made with 100% whole wheat flour; nas, wheat or rye crackers; tortillas, bran muffins  CEREALS: Whole grain cereal with bran (Chex, Raisin Bran, Corn Bran), oatmeal, rolled oats, granola, wheat flakes, brown rice  NUTS: Any nuts  FRUITS: All fresh fruits along with edible skins, (bananas, citrus fruit, mangoes, pears, prunes, raisins, apples, pineapple, apricot, melon, jams and marmalades), fruit juices (especially prune juice)  VEGETABLES: All types, preferably raw or lightly cooked: especially, celery, eggplant, potatoes, spinach, broccoli, brussel sprouts, winter squash, carrots, cauliflower, soybeans,  lentils, fresh and dried beans of all kinds  OTHER: Popcorn, any spices      [id number] [facility], [address] [place] [address], [place] [address]. All rights reserved. This information is not intended as a substitute for professional medical care. Always follow your healthcare professional's instructions.

## 2019-05-03 NOTE — PROCEDURES
PRE-PROCEDURE H&P    CHIEF COMPLAINT / REASON FOR PROCEDURE:  screening    PERTINENT HISTORY :    Past Medical History:   Diagnosis Date     BENIGN HYPERTENSION 2/15/2006     Diabetes (H)      Mild persistent asthma      Sleep apnea 2018    c pap      Past Surgical History:   Procedure Laterality Date     NO HISTORY OF SURGERY           Bleeding tendencies:  No    Relevant Family History:  NONE     Relevant Social History:  NONE      A relevant review of systems was performed and was negative      ALLERGIES/SENSITIVITIES:   Allergies   Allergen Reactions     Bacitracin Rash       CURRENT MEDICATIONS:   No current outpatient medications on file.        PRE-SEDATION ASSESSMENT:    Lung Exam:  normal  Heart Exam:  normal  Airway Exam: normal  Previous reaction to anesthesia/sedation:   No  Sedation plan based on assessment: Moderate (conscious) sedation  ASA Classification:  2 - Mild systemic disease      IMPRESSION:  screening    PLAN:  colonoscopy     Steffi Ruggiero  [facility]  Office: [phone]

## 2019-05-03 NOTE — LETTER
April 11, 2019      Lala George  [address]  [place] [address]        Dear Lala,         Thank you for choosing [facility]. You are scheduled for the following service:     Date:  5-3-19             Procedure:  COLONOSCOPY  Doctor:        Bahman   Arrival Time:  0900  *Check in at Emergency/Endoscopy desk*  Procedure Time:  0930      Location:   [facility]        Endoscopy Department, First Floor (Enter through ER Doors) *        [address] [place], [place] [address] [phone] or [phone] () to reschedule      MIRALAX -GATORADE  PREP  Colonoscopy is the most accurate test to detect colon polyps and colon cancer; and the only test where polyps can be removed. During this procedure, a doctor examines the lining of your large intestine and rectum through a flexible tube.   Transportation  Arrange for a ride for the day of your procedure with a responsible adult.  A taxi ride is not an option unless you are accompanied by a responsible adult. If you fail to arrange transportation with a responsible adult, your procedure will be cancelled and rescheduled.    Purchase the  following supplies at your local pharmacy:  - 2 (two) bisacodyl tablets: each tablet contains 5 mg.  (Dulcolax  laxative NOT Dulcolax  stool softener)   - 1 (one) 8.3 oz bottle of Polyethylene Glycol (PEG) 3350 Powder   (MiraLAX , Smooth LAX , ClearLAX  or equivalent)  - 64 oz Gatorade    Regular Gatorade, Gatorade G2 , Powerade , Powerade Zero  or Pedialyte  is acceptable. Red colored flavors are not allowed; all other colors (yellow, green, orange, purple and blue) are okay. It is also okay to buy two 2.12 oz packets of powdered Gatorade that can be mixed with water to a total volume of 64 oz of liquid.  - 1 (one) 10 oz bottle of Magnesium Citrate (Red colored flavors are not allowed)  It is also okay for you to use a 0.5 oz package of powdered magnesium  citrate (17 g) mixed with 10 oz of water.      PREPARATION FOR COLONOSCOPY    7 days before:    Discontinue fiber supplements and medications containing iron. This includes Metamucil  and Fibercon ; and multivitamins with iron.    3 days before:    Begin a low-fiber diet. A low-fiber diet helps making the cleanout more effective.     Examples of a low-fiber diet include (but are not limited to): white bread, white rice, pasta, crackers, fish, chicken, eggs, ground beef, creamy peanut butter, cooked/steamed/boiled vegetables, canned fruit, bananas, melons, milk, plain yogurt cheese, salad dressing and other condiments.     The following are not allowed on a low-fiber diet: seeds, nuts, popcorn, bran, whole wheat, corn, quinoa, raw fruits and vegetables, berries and dried fruit, beans and lentils.    For additional details on low-fiber diet, please refer to the table on the last page.    2 days before:    Continue the low-fiber diet.     Drink at least 8 glasses of water throughout the day.     Stop eating solid foods at 11:45 pm.    1 day before:    In the morning: begin a clear liquid diet (liquids you can see through).     Examples of a clear liquid diet include: water, clear broth or bouillon, Gatorade, Pedialyte or Powerade, carbonated and non-carbonated soft drinks (Sprite , 7-Up , ginger ale), strained fruit juices without pulp (apple, white grape, white cranberry), Jell-O  and popsicles.     The following are not allowed on a clear liquid diet: red liquids, alcoholic beverages, dairy products (milk, creamer, and yogurt), protein shakes, creamy broths, juice with pulp and chewing tobacco.    At noon: take 2 (two) bisacodyl tablets     At 4 (and no later than 6pm): start drinking the Miralax-Gatorade preparation (8.3 oz of Miralax mixed with 64 oz of Gatorade in a large pitcher). Drink 1(one) 8 oz glass every 15 minutes thereafter, until the mixture is gone.    COLON CLEANSING TIPS: drink adequate amounts of  fluids before and after your colon cleansing to prevent dehydration. Stay near a toilet because you will have diarrhea. Even if you are sitting on the toilet, continue to drink the cleansing solution every 15 minutes. If you feel nauseous or vomit, rinse your mouth with water, take a 15 to 30-minute-break and then continue drinking the solution. You will be uncomfortable until the stool has flushed from your colon (in about 2 to 4 hours). You may feel chilled.    Day of your procedure  You may take all of your morning medications including blood pressure medications, blood thinners (if you have not been instructed to stop these by our office), methadone, anti-seizure medications with sips of water 3 hours prior to your procedure or earlier. Do not take insulin or vitamins prior to your procedure. Continue the clear liquid diet.       4 hours prior: drink 10 oz of magnesium citrate. It may be easier to drink it with a straw.    STOP consuming all liquids after that.     Do not take anything by mouth during this time.     Allow extra time to travel to your procedure as you may need to stop and use a restroom along the way.    You are ready for the procedure, if you followed all instructions and your stool is no longer formed, but clear or yellow liquid. If you are unsure whether your colon is clean, please call our office at [phone] before you leave for your appointment.    Bring the following to your procedure:  - Insurance Card/Photo ID.   - List of current medications including over-the-counter medications and supplements.   - Your rescue inhaler if you currently use one to control asthma.      Canceling or rescheduling your appointment:   If you must cancel or reschedule your appointment, please call [phone] as soon as possible.      COLONOSCOPY PRE-PROCEDURE CHECKLIST    If you have diabetes, ask your regular doctor for diet and medication restrictions.  If you take an anticoagulant or anti-platelet  medication (such as Coumadin , Lovenox , Pradaxa , Xarelto , Eliquis , etc.), please call your primary doctor for advice on holding this medication.  If you take aspirin you may continue to do so.  If you are or may be pregnant, please discuss the risks and benefits of this procedure with your doctor.        What happens during a colonoscopy?    Plan to spend up to two hours, starting at registration time, at the endoscopy center the day of your procedure. The colonoscopy takes an average of 15 to 30 minutes. Recovery time is about 30 minutes.      Before the exam:    You will change into a gown.    Your medical history and medication list will be reviewed with you, unless that has been done over the phone prior to the procedure.     A nurse will insert an intravenous (IV) line into your hand or arm.    The doctor will meet with you and will give you a consent form to sign.  During the exam:     Medicine will be given through the IV line to help you relax.     Your heart rate and oxygen levels will be monitored. If your blood pressure is low, you may be given fluids through the IV line.     The doctor will insert a flexible hollow tube, called a colonoscope, into your rectum. The scope will be advanced slowly through the large intestine (colon).    You may have a feeling of fullness or pressure.     If an abnormal tissue or a polyp is found, the doctor may remove it through the endoscope for closer examination, or biopsy. Tissue removal is painless    After the exam:           Any tissue samples removed during the exam will be sent to a lab for evaluation. It may take 5-7 working days for you to be notified of the results.     A nurse will provide you with complete discharge instructions before you leave the endoscopy center. Be sure to ask the nurse for specific instructions if you take blood thinners such as Aspirin, Coumadin or Plavix.     The doctor will prepare a full report for you and for the physician who  referred you for the procedure.     Your doctor will talk with you about the initial results of your exam.      Medication given during the exam will prohibit you from driving for the rest of the day.     Following the exam, you may resume your normal diet. Your first meal should be light, no greasy foods. Avoid alcohol until the next day.     You may resume your regular activities the day after the procedure.         LOW-FIBER DIET    Foods RECOMMENDED Foods to AVOID   Breads, Cereal, Rice and Pasta:   White bread, rolls, biscuits, croissant and christelle toast.   Waffles, Cameroonian toast and pancakes.   White rice, noodles, pasta, macaroni and peeled cooked potatoes.   Plain crackers and saltines.   Cooked cereals: farina, cream of rice.   Cold cereals: Puffed Rice , Rice Krispies , Corn Flakes  and Special K    Breads, Cereal, Rice and Pasta:   Breads or rolls with nuts, seeds or fruit.   Whole wheat, pumpernickel, rye breads and cornbread.   Potatoes with skin, brown or wild rice, and kasha (buckwheat).     Vegetables:   Tender cooked and canned vegetables without seeds: carrots, asparagus tips, green or wax beans, pumpkin, spinach, lima beans. Vegetables:   Raw or steamed vegetables.   Vegetables with seeds.   Sauerkraut.   Winter squash, peas, broccoli, Brussel sprouts, cabbage, onions, cauliflower, baked beans, peas and corn.   Fruits:   Strained fruit juice.   Canned fruit, except pineapple.   Ripe bananas and melon. Fruits:   Prunes and prune juice.   Raw fruits.   Dried fruits: figs, dates and raisins.   Milk/Dairy:   Milk: plain or flavored.   Yogurt, custard and ice cream.   Cheese and cottage cheese Milk/Dairy:     Meat and other proteins:   ground, well-cooked tender beef, lamb, ham, veal, pork, fish, poultry and organ meats.   Eggs.   Peanut butter without nuts. Meat and other proteins:   Tough, fibrous meats with gristle.   Dry beans, peas and lentils.   Peanut butter with nuts.   Tofu.   Fats, Snack,  Sweets, Condiments and Beverages:   Margarine, butter, oils, mayonnaise, sour cream and salad dressing, plain gravy.   Sugar, hard candy, clear jelly, honey and syrup.   Spices, cooked herbs, bouillon, broth and soups made with allowed vegetable, ketchup and mustard.   Coffee, tea and carbonated drinks.   Plain cakes, cookies and pretzels.   Gelatin, plain puddings, custard, ice cream, sherbet and popsicles. Fats, Snack, Sweets, Condiments and Beverages:   Nuts, seeds and coconut.   Jam, marmalade and preserves.   Pickles, olives, relish and horseradish.   All desserts containing nuts, seeds, dried fruit and coconut; or made from whole grains or bran.   Candy made with nuts or seeds.   Popcorn.                     DIRECTIONS TO THE ENDOSCOPY DEPARTMENT     From the north ([facility])  Take [address] South, exit on [address] [address]. Get into the left hand kaveh, turn left (east), go one-half mile to [address] and turn left. Go north to the first stoplight, take a right on [address] and follow it to the Emergency entrance.    From the south ([facility])  Take [address]N to the [address] split and exit on [address] [address]. On [address] [address], turn left (west) to [address]. Turn right (north) on [address]. Go north to the first stoplight, take a right on [address] and follow it to the Emergency entrance.    From the east via 35[address] ([facility])  Take [address] south to [address] [address] exit. Turn right on [address] [address]. Go west to [address]. Turn right (north) on [address]. Go to the first stoplight, take a right and follow on [address] to the Emergency entrance.    From the east via [address] ([facility])  Take [address] to [address]. Turn left (south) on [address] to [address]. Turn left (east) on [address] and follow it to the Emergency entrance.    From the west via [address] ([place], [place])  Take [address] east to [address]. Turn right (south) on [address] to [facility]. Turn left (east) on [facility] and follow it to the Emergency entrance.

## 2019-05-08 NOTE — TELEPHONE ENCOUNTER
Referral signed but I'm guessing the referral may also need to be printed and faxed to her ophthalmologist.  Or, the referral can be mailed to the patient to present at the time of her eye appointment.  Thanks,  Idalia Boudreaux NP  Endocrinology

## 2019-05-08 NOTE — TELEPHONE ENCOUNTER
Faxed referral to Ophthalmologist at [facility] ([phone]). Sent patient a [place] message with an update, also asked her if she wanted a copy faxed to her.

## 2019-05-13 ENCOUNTER — OFFICE VISIT (OUTPATIENT)
Dept: OPTOMETRY | Facility: CLINIC | Age: 49
End: 2019-05-13
Payer: COMMERCIAL

## 2019-05-13 DIAGNOSIS — E11.9 DIABETES MELLITUS WITHOUT OPHTHALMIC MANIFESTATIONS (H): ICD-10-CM

## 2019-05-13 DIAGNOSIS — H52.13 MYOPIA OF BOTH EYES: Primary | ICD-10-CM

## 2019-05-13 PROCEDURE — 92015 DETERMINE REFRACTIVE STATE: CPT | Performed by: OPTOMETRIST

## 2019-05-13 PROCEDURE — 92310 CONTACT LENS FITTING OU: CPT | Performed by: OPTOMETRIST

## 2019-05-13 PROCEDURE — 92014 COMPRE OPH EXAM EST PT 1/>: CPT | Performed by: OPTOMETRIST

## 2019-05-13 ASSESSMENT — REFRACTION_CURRENTRX
OS_BRAND: ALCON AIR OPTIX PLUS HYDRAGLYDE BC 8.6, D 14.2
OS_BRAND: ALCON AIR OPTIX AQUA BC 8.6, D 14.2
OD_BRAND: ALCON AIR OPTIX AQUA BC 8.6, D 14.2
OD_SPHERE: -4.50
OS_SPHERE: -5.00
OD_SPHERE: -4.00
OS_SPHERE: -4.75
OD_BRAND: ALCON AIR OPTIX PLUS HYDRAGLYDE BC 8.6, D 14.2

## 2019-05-13 ASSESSMENT — KERATOMETRY
OS_AXISANGLE2_DEGREES: 143
OD_K2POWER_DIOPTERS: 44.50
OS_K1POWER_DIOPTERS: 43.75
OS_AXISANGLE_DEGREES: 053
OS_K2POWER_DIOPTERS: 44.12
OD_AXISANGLE_DEGREES: 179
OD_AXISANGLE2_DEGREES: 89
OD_K1POWER_DIOPTERS: 44.00

## 2019-05-13 ASSESSMENT — VISUAL ACUITY
METHOD: SNELLEN - LINEAR
OD_CC: 20/25
OS_CC: 20/60
OD_SC: 20/500
OS_CC: 20/25
OD_CC: 20/80-1
OS_SC: 20/500
OS_CC+: -3
CORRECTION_TYPE: GLASSES
OD_CC+: -1

## 2019-05-13 ASSESSMENT — SLIT LAMP EXAM - LIDS
COMMENTS: NORMAL
COMMENTS: NORMAL

## 2019-05-13 ASSESSMENT — REFRACTION_MANIFEST
OD_SPHERE: -5.50
OS_AXIS: 005
OS_CYLINDER: +0.50
OS_SPHERE: -6.00
METHOD_AUTOREFRACTION: 1
OD_SPHERE: -6.25
OD_AXIS: 005
OD_AXIS: 026
OD_CYLINDER: +0.75
OS_AXIS: 130
OS_ADD: +2.00
OS_SPHERE: -7.50
OD_CYLINDER: +0.75
OS_CYLINDER: +0.50
OD_ADD: +2.00

## 2019-05-13 ASSESSMENT — TONOMETRY
OS_IOP_MMHG: 16
OD_IOP_MMHG: 17
IOP_METHOD: APPLANATION

## 2019-05-13 ASSESSMENT — CUP TO DISC RATIO
OD_RATIO: 0.5
OS_RATIO: 0.55

## 2019-05-13 ASSESSMENT — REFRACTION_WEARINGRX
OS_CYLINDER: +0.25
OS_SPHERE: -5.25
OD_AXIS: 011
OD_SPHERE: -4.75
OD_CYLINDER: +1.00
OS_AXIS: 148
SPECS_TYPE: SVL

## 2019-05-13 ASSESSMENT — CONF VISUAL FIELD
OD_NORMAL: 1
OS_NORMAL: 1
METHOD: COUNTING FINGERS

## 2019-05-13 ASSESSMENT — EXTERNAL EXAM - RIGHT EYE: OD_EXAM: NORMAL

## 2019-05-13 ASSESSMENT — EXTERNAL EXAM - LEFT EYE: OS_EXAM: NORMAL

## 2019-05-13 NOTE — PROGRESS NOTES
Chief Complaint   Patient presents with     Annual Eye Exam     Patient wants updated CL rx- has been wearing glasses only for about a year but wants update    Wears distance only glasses, has been taking off to read    Lab Results   Component Value Date    A1C 9.5 03/06/2019    A1C 11.9 12/05/2018    A1C 11.4 07/25/2018    A1C 11.9 04/23/2018    A1C 10.1 01/24/2018       Last Eye Exam: 2018  Dilated Previously: Yes    What are you currently using to see?  glasses, contacts    Distance Vision Acuity: Satisfied with vision    Near Vision Acuity: Noticed more trouble reading in dim light    Eye Comfort: good  Do you use eye drops? : No  Occupation or Hobbies: Customer Service    Bailey Montes, Optometric Assistant     Medical, surgical and family histories reviewed and updated 5/13/2019.       OBJECTIVE: See Ophthalmology exam    ASSESSMENT:  No diagnosis found.   PLAN:    Lala George aware  eye exam results will be sent to Heidy Jama  There are no Patient Instructions on file for this visit.

## 2019-05-13 NOTE — PATIENT INSTRUCTIONS
Patient Education   Diabetes weakens the blood vessels all over the body, including the eyes. Damage to the blood vessels in the eyes can cause swelling or bleeding into part of the eye (called the retina). This is called diabetic retinopathy (ANUJA-tin-AH-pu-thee). If not treated, this disease can cause vision loss or blindness.   Symptoms may include blurred or distorted vision, but many people have no symptoms. It's important to see your eye doctor regularly to check for problems.   Early treatment and good control can help protect your vision. Here are the things you can do to help prevent vision loss:      1. Keep your blood sugar levels under tight control.      2. Bring high blood pressure under control.      3. No smoking.      4. Have yearly dilated eye exams.    Once your contact lens prescription is finalized and you are not having any problems with the trials or current lenses you can order your supply of lenses.   You can order your contact lenses online at [email or web address].  Click on services at the top of the page, then eye care and you will see the link to order contact lenses.  You can also order contact lenses at [phone]. There is no shipping fee if you order an annual supply otherwise  be sure to let them know which office you would like to  the lenses, [place] [phone].

## 2019-05-13 NOTE — LETTER
5/13/2019         RE: Lala George  [address]  [place] [address]        Dear Colleague,    Thank you for referring your patient, Lala George, to the [facility]. Please see a copy of my visit note below.    Chief Complaint   Patient presents with     Annual Eye Exam     Patient wants updated CL rx- has been wearing glasses only for about a year but wants update    Wears distance only glasses, has been taking off to read    Lab Results   Component Value Date    A1C 9.5 03/06/2019    A1C 11.9 12/05/2018    A1C 11.4 07/25/2018    A1C 11.9 04/23/2018    A1C 10.1 01/24/2018       Last Eye Exam: 2018  Dilated Previously: Yes    What are you currently using to see?  glasses, contacts    Distance Vision Acuity: Satisfied with vision    Near Vision Acuity: Noticed more trouble reading in dim light    Eye Comfort: good  Do you use eye drops? : No  Occupation or Hobbies: Customer Service    Bailey Montes, Optometric Assistant     Medical, surgical and family histories reviewed and updated 5/13/2019.       OBJECTIVE: See Ophthalmology exam    ASSESSMENT:  No diagnosis found.   PLAN:    Lala George aware  eye exam results will be sent to Heidy Jama  There are no Patient Instructions on file for this visit.         Again, thank you for allowing me to participate in the care of your patient.        Sincerely,        Marianna Magallanes OD

## 2019-05-14 NOTE — TELEPHONE ENCOUNTER
Lidex solution very expensive. Looked at other options. All similar or more expensive. Spoke with pharmacy. They will let her know she could call her insurance to see if there is a preferred topical option for her since what we are finding is similar. Likely is just a deductible issue.  Heidy Jama M.D.     No

## 2019-06-03 ENCOUNTER — OFFICE VISIT (OUTPATIENT)
Dept: PEDIATRICS | Facility: CLINIC | Age: 49
End: 2019-06-03
Payer: COMMERCIAL

## 2019-06-03 VITALS
OXYGEN SATURATION: 96 % | BODY MASS INDEX: 39.74 KG/M2 | HEIGHT: 65 IN | SYSTOLIC BLOOD PRESSURE: 116 MMHG | WEIGHT: 238.5 LBS | TEMPERATURE: 99 F | HEART RATE: 101 BPM | DIASTOLIC BLOOD PRESSURE: 68 MMHG

## 2019-06-03 DIAGNOSIS — M25.511 CHRONIC RIGHT SHOULDER PAIN: ICD-10-CM

## 2019-06-03 DIAGNOSIS — G47.33 OSA (OBSTRUCTIVE SLEEP APNEA): ICD-10-CM

## 2019-06-03 DIAGNOSIS — G89.29 CHRONIC RIGHT SHOULDER PAIN: ICD-10-CM

## 2019-06-03 PROCEDURE — 99214 OFFICE O/P EST MOD 30 MIN: CPT | Performed by: INTERNAL MEDICINE

## 2019-06-03 ASSESSMENT — PATIENT HEALTH QUESTIONNAIRE - PHQ9: SUM OF ALL RESPONSES TO PHQ QUESTIONS 1-9: 0

## 2019-06-03 ASSESSMENT — MIFFLIN-ST. JEOR: SCORE: 1697.71

## 2019-06-03 NOTE — PROGRESS NOTES
Subjective     Lala George is a 49 year old female who presents to clinic today for the following health issues:    HPI   Diabetes Follow-up      How often are you checking your blood sugar? Three times daily    What time of day are you checking your blood sugars (select all that apply)?  Before meals and At bedtime    Have you had any blood sugars above 200?  Yes 2 x's    Have you had any blood sugars below 70?  No    What symptoms do you notice when your blood sugar is low?  None    What concerns do you have today about your diabetes? None     Do you have any of these symptoms? (Select all that apply)  No numbness or tingling in feet.  No redness, sores or blisters on feet.  No complaints of excessive thirst.  No reports of blurry vision.  No significant changes to weight.     Have you had a diabetic eye exam in the last 12 months? Yes- Date of last eye exam: 3/6/19    Diabetes Management Resources    Hyperlipidemia Follow-Up      Are you having any of the following symptoms? (Select all that apply)  No complaints of shortness of breath, chest pain or pressure.  No increased sweating or nausea with activity.  No left-sided neck or arm pain.  No complaints of pain in calves when walking 1-2 blocks.    Are you regularly taking any medication or supplement to lower your cholesterol?   Yes- atorvastatin    Are you having muscle aches or other side effects that you think could be caused by your cholesterol lowering medication?  No    Hypertension Follow-up      Do you check your blood pressure regularly outside of the clinic? No     Are you following a low salt diet? Yes    Are your blood pressures ever more than 140 on the top number (systolic) OR more   than 90 on the bottom number (diastolic), for example 140/90? No    BP Readings from Last 2 Encounters:   06/03/19 116/68   05/03/19 119/76     Hemoglobin A1C (%)   Date Value   03/06/2019 9.5 (H)   12/05/2018 11.9 (H)     LDL Cholesterol Calculated (mg/dL)   Date  Value   03/16/2019 54   02/21/2018 84       Amount of exercise or physical activity: None    Problems taking medications regularly: No    Medication side effects: none    Diet: carbohydrate counting      Shoulder hurting for over 6 months. No specific injury. Does have repetitive movements at work. Hurts to lift her arm above 90 degrees.     Patient Active Problem List   Diagnosis     Mild persistent asthma     Essential hypertension     Dyslipidemia     Family history of breast cancer in mother     Atopic dermatitis     Morbid obesity (H)     Abnormal liver enzymes     High triglycerides     Uncontrolled type 2 diabetes mellitus without complication, with long-term current use of insulin (H)     Hyperlipidemia LDL goal <100     LUCIO (obstructive sleep apnea)     Past Surgical History:   Procedure Laterality Date     COLONOSCOPY N/A 5/3/2019    Procedure: COLONOSCOPY;  Surgeon: Steffi Ruggiero MD;  Location: [facility]     NO HISTORY OF SURGERY         Social History     Tobacco Use     Smoking status: Never Smoker     Smokeless tobacco: Never Used   Substance Use Topics     Alcohol use: Yes     Comment: rare--one to two per week     Family History   Problem Relation Age of Onset     Breast Cancer Mother 30        first diagnosed in her early 30's     Hypertension Mother      Diabetes Mother      C.A.D. Mother         CHF 50s ,pacemaker late 50s, CABG 60s     C.A.D. Maternal Aunt         60s     Glaucoma No family hx of      Macular Degeneration No family hx of            Obesity: did have appointment with therapist, but had to cancel due to scheduling issues. Feels she can't really do this now, things are very busy.    Is using CPAP nightly. She reports she can definitely tell when she doesn't use this.    Reviewed and updated as needed this visit by Provider  Tobacco  Allergies  Meds  Problems  Med Hx  Surg Hx  Fam Hx         Review of Systems   ROS COMP: Constitutional, HEENT, cardiovascular, pulmonary, gi  "and gu systems are negative, except as otherwise noted.      Objective    /68 (Cuff Size: Adult Large)   Pulse 101   Temp 99  F (37.2  C) (Tympanic)   Ht 1.651 m (5' 5\")   Wt 108.2 kg (238 lb 8 oz)   LMP 01/18/2013   SpO2 96%   BMI 39.69 kg/m    Body mass index is 39.69 kg/m .  Physical Exam   GENERAL: healthy, alert and no distress  MS: R shoulder with full ROM, painful at full extension. Mild tenderness to palpation over proximal humerus. No bruising or swelling. No warmth.    Diagnostic Test Results:  Labs reviewed in Epic        Assessment & Plan     1. Uncontrolled type 2 diabetes mellitus without complication, with long-term current use of insulin (H)  Discussed option to continue increasing dose if endo feels would help for weight management.   - Semaglutide (OZEMPIC) 0.25 or 0.5 MG/DOSE SOPN; Inject 0.5 mg Subcutaneous once a week  Dispense: 1.5 mL; Refill: 3  - **A1C FUTURE anytime; Future    2. LUCIO (obstructive sleep apnea)  Continue CPAP.    3. Chronic right shoulder pain  Possible rotator cuff vs shoulder impingement. Plan PT. Discussed avoiding activities that trigger.   - [facility]RACT[facility] REFERRAL; Future     BMI:   Estimated body mass index is 39.69 kg/m  as calculated from the following:    Height as of this encounter: 1.651 m (5' 5\").    Weight as of this encounter: 108.2 kg (238 lb 8 oz).   Weight management plan: Patient referred to endocrine and/or weight management specialty        Patient Instructions   Ask if you can print and turn in your freestyle estiven readings for credit for your insulin discount.    Consider calling to set up an appointment for mental health visit.     [facility] will call you to set up an appointment.  [phone]      Follow up in 3 months.     Heidy Jama MD  [facility]      "

## 2019-06-03 NOTE — PATIENT INSTRUCTIONS
Ask if you can print and turn in your freestyle estiven readings for credit for your insulin discount.    Consider calling to set up an appointment for mental health visit.     [facility] will call you to set up an appointment.  [phone]

## 2019-06-07 ENCOUNTER — OFFICE VISIT (OUTPATIENT)
Dept: ENDOCRINOLOGY | Facility: CLINIC | Age: 49
End: 2019-06-07
Payer: COMMERCIAL

## 2019-06-07 VITALS
DIASTOLIC BLOOD PRESSURE: 65 MMHG | WEIGHT: 233.8 LBS | HEART RATE: 96 BPM | SYSTOLIC BLOOD PRESSURE: 113 MMHG | BODY MASS INDEX: 38.91 KG/M2 | RESPIRATION RATE: 12 BRPM | TEMPERATURE: 97.8 F

## 2019-06-07 DIAGNOSIS — E78.5 HYPERLIPIDEMIA LDL GOAL <100: ICD-10-CM

## 2019-06-07 DIAGNOSIS — I10 ESSENTIAL HYPERTENSION: ICD-10-CM

## 2019-06-07 LAB — HBA1C MFR BLD: 8.5 % (ref 0–5.6)

## 2019-06-07 PROCEDURE — 83036 HEMOGLOBIN GLYCOSYLATED A1C: CPT | Performed by: CLINICAL NURSE SPECIALIST

## 2019-06-07 PROCEDURE — 99214 OFFICE O/P EST MOD 30 MIN: CPT | Performed by: CLINICAL NURSE SPECIALIST

## 2019-06-07 PROCEDURE — 36415 COLL VENOUS BLD VENIPUNCTURE: CPT | Performed by: CLINICAL NURSE SPECIALIST

## 2019-06-07 NOTE — RESULT ENCOUNTER NOTE
Lala,  Here's a copy of your recent A1c result for your records.  Iadlia Boudreaux NP  Endocrinology

## 2019-06-07 NOTE — LETTER
2019         RE: Lala George  [address]  [facility] [address]        Dear Colleague,    Thank you for referring your patient, Lala George, to the [facility]. Please see a copy of my visit note below.    Name: Lala George  Seen at the request of Heidy Jama for Diabetes (3/7/2019).  HPI:  Lala George is a 49 year old female who presents for the management of:    1. Type 2 DM:  Originally diagnosed: 2014.  Glucose slightly elevated 109-113 the year prior to diagnosis.  Glucose at the time of diagnosis was 317.    Current Regimen: Metformin XR 2000 mg/day, Tresiba 70 units q hs, Novolog 25 units tid +1:25>150.  Ozempic 0.5 mg weekly    Not consistent about taking Novolog - forgets.    Initially treated with Metformin, Lantus insulin was added a short time after diagnosis. Lantus was later changed to Basaglar due to insurance, then changed to Tresiba.  Humalog then added.  Was briefly treated with glipizide in , for about 2-3 months, discontinued due to ineffectiveness  Trulicity was added 2017, this was later changed to Tanzeum due to insurance formulary. Tanzeum no longer available.  Started Ozempic 3/2019      BS checks: Continuous, Sandi personal CGM  Sandi:  Average B.  46% above target range.  52% in target range.  2% below target range.      Works night shift 10 pm to 6 am, Tuesday - .    Complications:   Diabetes Complications  Description / Detail    Diabetic Retinopathy  No retinopathy, last eye exam 2019   CAD / PAD  No   Neuropathy  No   Nephropathy / Microalbuminuria  Elevated urine microalbumin x 1   Gastroparesis  No   Hypoglycemia Unawareness  No     2. Hypertension: Blood Pressure today:   BP Readings from Last 3 Encounters:   19 113/65   19 116/68   19 119/76   .  Blood pressure medications include losartan 100 mg qd, HCTZ 25 mg qd.  .    3. Hyperlipidemia: Takes atorvastatin 10 mg qd for lipid control.   Denies muscle aches of pains.       4. Prevention:  Flu Shot- 8/2018  Pneumovax- 3/2/2015  Opthalmology-Yes: 5/2019  Dental-recommend semiannually  ASA-No  Smoking- No  PMH/PSH:  Past Medical History:   Diagnosis Date     BENIGN HYPERTENSION 2/15/2006     Diabetes (H)      Mild persistent asthma      Sleep apnea 2018    c pap     Past Surgical History:   Procedure Laterality Date     COLONOSCOPY N/A 5/3/2019    Procedure: COLONOSCOPY;  Surgeon: Steffi Ruggiero MD;  Location:  GI     NO HISTORY OF SURGERY       Family Hx:  Family History   Problem Relation Age of Onset     Breast Cancer Mother 30        first diagnosed in her early 30's     Hypertension Mother      Diabetes Mother      C.A.D. Mother         CHF 50s ,pacemaker late 50s, CABG 60s     C.A.D. Maternal Aunt         60s     Glaucoma No family hx of      Macular Degeneration No family hx of      Thyroid disease: No         DM2: Yes: mother and one sibling         Autoimmune: DM1, SLE, RA, Vitiligo No    Social Hx:  Social History     Socioeconomic History     Marital status: Single     Spouse name: Not on file     Number of children: Not on file     Years of education: Not on file     Highest education level: Not on file   Occupational History     Employer: OTHER     Comment: part time, [employer]   Social Needs     Financial resource strain: Not on file     Food insecurity:     Worry: Not on file     Inability: Not on file     Transportation needs:     Medical: Not on file     Non-medical: Not on file   Tobacco Use     Smoking status: Never Smoker     Smokeless tobacco: Never Used   Substance and Sexual Activity     Alcohol use: Yes     Comment: rare--one to two per week     Drug use: No     Sexual activity: Never   Lifestyle     Physical activity:     Days per week: Not on file     Minutes per session: Not on file     Stress: Not on file   Relationships     Social connections:     Talks on phone: Not on file     Gets together: Not on file      Attends [employer] service: Not on file     Active member of club or organization: Not on file     Attends meetings of clubs or organizations: Not on file     Relationship status: Not on file     Intimate partner violence:     Fear of current or ex partner: Not on file     Emotionally abused: Not on file     Physically abused: Not on file     Forced sexual activity: Not on file   Other Topics Concern     Parent/sibling w/ CABG, MI or angioplasty before 65F 55M? Not Asked   Social History Narrative     Not on file          MEDICATIONS:  has a current medication list which includes the following prescription(s): albuterol, atorvastatin, blood glucose monitoring, blood glucose monitoring, blood glucose, blood glucose, freestyle estiven 14 day reader, freestyle estiven 14 day sensor, fluocinonide, fluocinonide, hydrochlorothiazide, insulin aspart, insulin degludec, insulin pen needle, losartan, metformin, semaglutide, and sertraline.    ROS     ROS: 10 point ROS neg other than the symptoms noted above in the HPI.    Physical Exam   VS: /65 (BP Location: Right arm, Patient Position: Chair, Cuff Size: Adult Large)   Pulse 96   Temp 97.8  F (36.6  C) (Oral)   Resp 12   Wt 106.1 kg (233 lb 12.8 oz)   LMP 01/18/2013   Breastfeeding? No   BMI 38.91 kg/m     GENERAL: AXOX3, NAD, well dressed, answering questions appropriately, appears stated age.  HEENT:  no exophthalmos, no proptosis, no lig lag, no retraction  CV: RRR, no rubs, gallops, no murmurs  LUNGS: CTAB, no wheezes, rales, or rhonchi  EXTREMITIES: no edema  NEUROLOGY: CN grossly intact, no tremors  MSK: grossly intact    LABS:  A1c:   Component      Latest Ref Rng & Units 12/5/2018 3/6/2019 6/7/2019   Hemoglobin A1C      0 - 5.6 % 11.9 (H) 9.5 (H) 8.5 (H)     Basic Metabolic Panel:  !COMPREHENSIVE Latest Ref Rng & Units 3/6/2019   SODIUM 133 - 144 mmol/L 138   POTASSIUM 3.4 - 5.3 mmol/L 3.9   CHLORIDE 94 - 109 mmol/L 103   BUN 7 - 30 mg/dL 20   Creatinine 0.52  - 1.04 mg/dL 0.59   Glucose 70 - 99 mg/dL 175 (H)   ANION GAP 3 - 14 mmol/L 4   CALCIUM 8.5 - 10.1 mg/dL 9.3   ALBUMIN 3.4 - 5.0 g/dL 4.3     LFTS/Cholesterol Panel:  !LIPID/HEPATIC Latest Ref Rng & Units 3/6/2019 3/16/2019   CHOLESTEROL <200 mg/dL  134   TRIGLYCERIDES <150 mg/dL  151 (H)   HDL CHOLESTEROL >49 mg/dL  49 (L)   LDL CHOLESTEROL, CALCULATED <100 mg/dL  54   VLDL-CHOLESTEROL 0 - 30 mg/dL     NON HDL CHOLESTEROL <130 mg/dL  84   CHOLESTEROL/HDL RATIO 0.0 - 5.0     AST 0 - 45 U/L 34    ALT 0 - 50 U/L 52 (H)      Thyroid Function:   !LIPID/HEPATIC Latest Ref Rng & Units 3/6/2019 3/16/2019   CHOLESTEROL <200 mg/dL  134   TRIGLYCERIDES <150 mg/dL  151 (H)   HDL CHOLESTEROL >49 mg/dL  49 (L)   LDL CHOLESTEROL, CALCULATED <100 mg/dL  54   VLDL-CHOLESTEROL 0 - 30 mg/dL     NON HDL CHOLESTEROL <130 mg/dL  84   CHOLESTEROL/HDL RATIO 0.0 - 5.0     AST 0 - 45 U/L 34    ALT 0 - 50 U/L 52 (H)      Urine MicroAlbumin:  Component      Latest Ref Rng & Units 3/16/2019   Creatinine Urine      mg/dL 43   Albumin Urine mg/L      mg/L 5   Albumin Urine mg/g Cr      0 - 25 mg/g Cr 11.67     Vitamin D:    All pertinent notes, labs, and images personally reviewed by me.     A/P  Ms.Shari George is a 49 year old here for the evaluation/management of diabetes:    1. DM2 - Uncontrolled.  Increase Ozempic to 1 mg weekly  Continue Tresiba to 70 units qd.  Continue Novolog to 25 units tid + 1:25>150.  Take Novolog consistently before each meal.  Continue 14-day Sandi  Previously discussed insulin pump therapy - demo Omnipod was provided - is interested.  Follow up with diabetes ed for pre-pump education - was previously scheduled but had to cancel - plans to reschedule.  There is some variability among people, most will usually develop symptoms suggestive of hypoglycemia when blood glucose levels are lowered to the mid 60's. The first set of symptoms are called adrenergic. Patients may experience any of the following nervousness,  sweating, intense hunger, trembling, weakness, palpitations, and difficulty speaking.   The acute management of hypoglycemia involves the rapid delivery of a source of easily absorbed sugar. Regular soda, juice, lifesavers, table sugar, are good options. 15 grams of glucose is the dose that is given, followed by an assessment of symptoms and a blood glucose check if possible. If after 10 minutes there is no improvement, another 10-15 grams should be given. This can be repeated up to three times. The equivalency of 10-15 grams of glucose (approximate servings) are: 3-5 hard candies, 3 teaspoons of sugar, or 1/2 cup of regular soda or juice.    2. Hypertension - Controlled.    3. Hyperlipidemia - On statin therapy.      Labs ordered today:   Orders Placed This Encounter   Procedures     Hemoglobin A1c       Radiology/Consults ordered today: None    All questions were answered.  The patient indicates understanding of the above issues and agrees with the plan set forth.  Total face to face time greater than or equal to 25 minutes.       Follow-up:  3 months in clinic with me    Idalia Boudreaux NP  Endocrinology  [facility]  CC: Heidy Jama, thank you for allowing me to participate in the care of your patient.        Sincerely,        EDMOND Velez CNP

## 2019-06-07 NOTE — PROGRESS NOTES
Name: Lala George  Seen at the request of Heidy Jama for Diabetes (3/7/2019).  HPI:  Lala George is a 49 year old female who presents for the management of:    1. Type 2 DM:  Originally diagnosed: 2014.  Glucose slightly elevated 109-113 the year prior to diagnosis.  Glucose at the time of diagnosis was 317.    Current Regimen: Metformin XR 2000 mg/day, Tresiba 70 units q hs, Novolog 25 units tid +1:25>150.  Ozempic 0.5 mg weekly    Not consistent about taking Novolog - forgets.    Initially treated with Metformin, Lantus insulin was added a short time after diagnosis. Lantus was later changed to Basaglar due to insurance, then changed to Tresiba.  Humalog then added.  Was briefly treated with glipizide in , for about 2-3 months, discontinued due to ineffectiveness  Trulicity was added 2017, this was later changed to Tanzeum due to insurance formulary. Tanzeum no longer available.  Started Ozempic 3/2019      BS checks: Continuous, Sandi personal CGM  Sandi:  Average B.  46% above target range.  52% in target range.  2% below target range.      Works night shift 10 pm to 6 am, Tuesday - .    Complications:   Diabetes Complications  Description / Detail    Diabetic Retinopathy  No retinopathy, last eye exam 2019   CAD / PAD  No   Neuropathy  No   Nephropathy / Microalbuminuria  Elevated urine microalbumin x 1   Gastroparesis  No   Hypoglycemia Unawareness  No     2. Hypertension: Blood Pressure today:   BP Readings from Last 3 Encounters:   19 113/65   19 116/68   19 119/76   .  Blood pressure medications include losartan 100 mg qd, HCTZ 25 mg qd.  .    3. Hyperlipidemia: Takes atorvastatin 10 mg qd for lipid control.  Denies muscle aches of pains.       4. Prevention:  Flu Shot- 2018  Pneumovax- 3/2/2015  Opthalmology-Yes: 2019  Dental-recommend semiannually  ASA-No  Smoking- No  PMH/PSH:  Past Medical History:   Diagnosis Date     BENIGN HYPERTENSION  2/15/2006     Diabetes (H)      Mild persistent asthma      Sleep apnea 2018    c pap     Past Surgical History:   Procedure Laterality Date     COLONOSCOPY N/A 5/3/2019    Procedure: COLONOSCOPY;  Surgeon: Steffi Ruggiero MD;  Location:  GI     NO HISTORY OF SURGERY       Family Hx:  Family History   Problem Relation Age of Onset     Breast Cancer Mother 30        first diagnosed in her early 30's     Hypertension Mother      Diabetes Mother      C.A.D. Mother         CHF 50s ,pacemaker late 50s, CABG 60s     C.A.D. Maternal Aunt         60s     Glaucoma No family hx of      Macular Degeneration No family hx of      Thyroid disease: No         DM2: Yes: mother and one sibling         Autoimmune: DM1, SLE, RA, Vitiligo No    Social Hx:  Social History     Socioeconomic History     Marital status: Single     Spouse name: Not on file     Number of children: Not on file     Years of education: Not on file     Highest education level: Not on file   Occupational History     Employer: OTHER     Comment: part time, [employer]   Social Needs     Financial resource strain: Not on file     Food insecurity:     Worry: Not on file     Inability: Not on file     Transportation needs:     Medical: Not on file     Non-medical: Not on file   Tobacco Use     Smoking status: Never Smoker     Smokeless tobacco: Never Used   Substance and Sexual Activity     Alcohol use: Yes     Comment: rare--one to two per week     Drug use: No     Sexual activity: Never   Lifestyle     Physical activity:     Days per week: Not on file     Minutes per session: Not on file     Stress: Not on file   Relationships     Social connections:     Talks on phone: Not on file     Gets together: Not on file     Attends [employer] service: Not on file     Active member of club or organization: Not on file     Attends meetings of clubs or organizations: Not on file     Relationship status: Not on file     Intimate partner violence:     Fear of current or ex  partner: Not on file     Emotionally abused: Not on file     Physically abused: Not on file     Forced sexual activity: Not on file   Other Topics Concern     Parent/sibling w/ CABG, MI or angioplasty before 65F 55M? Not Asked   Social History Narrative     Not on file          MEDICATIONS:  has a current medication list which includes the following prescription(s): albuterol, atorvastatin, blood glucose monitoring, blood glucose monitoring, blood glucose, blood glucose, freestyle estiven 14 day reader, freestyle estiven 14 day sensor, fluocinonide, fluocinonide, hydrochlorothiazide, insulin aspart, insulin degludec, insulin pen needle, losartan, metformin, semaglutide, and sertraline.    ROS     ROS: 10 point ROS neg other than the symptoms noted above in the HPI.    Physical Exam   VS: /65 (BP Location: Right arm, Patient Position: Chair, Cuff Size: Adult Large)   Pulse 96   Temp 97.8  F (36.6  C) (Oral)   Resp 12   Wt 106.1 kg (233 lb 12.8 oz)   LMP 01/18/2013   Breastfeeding? No   BMI 38.91 kg/m    GENERAL: AXOX3, NAD, well dressed, answering questions appropriately, appears stated age.  HEENT:  no exophthalmos, no proptosis, no lig lag, no retraction  CV: RRR, no rubs, gallops, no murmurs  LUNGS: CTAB, no wheezes, rales, or rhonchi  EXTREMITIES: no edema  NEUROLOGY: CN grossly intact, no tremors  MSK: grossly intact    LABS:  A1c:   Component      Latest Ref Rng & Units 12/5/2018 3/6/2019 6/7/2019   Hemoglobin A1C      0 - 5.6 % 11.9 (H) 9.5 (H) 8.5 (H)     Basic Metabolic Panel:  !COMPREHENSIVE Latest Ref Rng & Units 3/6/2019   SODIUM 133 - 144 mmol/L 138   POTASSIUM 3.4 - 5.3 mmol/L 3.9   CHLORIDE 94 - 109 mmol/L 103   BUN 7 - 30 mg/dL 20   Creatinine 0.52 - 1.04 mg/dL 0.59   Glucose 70 - 99 mg/dL 175 (H)   ANION GAP 3 - 14 mmol/L 4   CALCIUM 8.5 - 10.1 mg/dL 9.3   ALBUMIN 3.4 - 5.0 g/dL 4.3     LFTS/Cholesterol Panel:  !LIPID/HEPATIC Latest Ref Rng & Units 3/6/2019 3/16/2019   CHOLESTEROL <200 mg/dL   134   TRIGLYCERIDES <150 mg/dL  151 (H)   HDL CHOLESTEROL >49 mg/dL  49 (L)   LDL CHOLESTEROL, CALCULATED <100 mg/dL  54   VLDL-CHOLESTEROL 0 - 30 mg/dL     NON HDL CHOLESTEROL <130 mg/dL  84   CHOLESTEROL/HDL RATIO 0.0 - 5.0     AST 0 - 45 U/L 34    ALT 0 - 50 U/L 52 (H)      Thyroid Function:   !LIPID/HEPATIC Latest Ref Rng & Units 3/6/2019 3/16/2019   CHOLESTEROL <200 mg/dL  134   TRIGLYCERIDES <150 mg/dL  151 (H)   HDL CHOLESTEROL >49 mg/dL  49 (L)   LDL CHOLESTEROL, CALCULATED <100 mg/dL  54   VLDL-CHOLESTEROL 0 - 30 mg/dL     NON HDL CHOLESTEROL <130 mg/dL  84   CHOLESTEROL/HDL RATIO 0.0 - 5.0     AST 0 - 45 U/L 34    ALT 0 - 50 U/L 52 (H)      Urine MicroAlbumin:  Component      Latest Ref Rng & Units 3/16/2019   Creatinine Urine      mg/dL 43   Albumin Urine mg/L      mg/L 5   Albumin Urine mg/g Cr      0 - 25 mg/g Cr 11.67     Vitamin D:    All pertinent notes, labs, and images personally reviewed by me.     A/P  Ms.Shari George is a 49 year old here for the evaluation/management of diabetes:    1. DM2 - Uncontrolled.  Increase Ozempic to 1 mg weekly  Continue Tresiba to 70 units qd.  Continue Novolog to 25 units tid + 1:25>150.  Take Novolog consistently before each meal.  Continue 14-day Sandi  Previously discussed insulin pump therapy - demo Omnipod was provided - is interested.  Follow up with diabetes ed for pre-pump education - was previously scheduled but had to cancel - plans to reschedule.  There is some variability among people, most will usually develop symptoms suggestive of hypoglycemia when blood glucose levels are lowered to the mid 60's. The first set of symptoms are called adrenergic. Patients may experience any of the following nervousness, sweating, intense hunger, trembling, weakness, palpitations, and difficulty speaking.   The acute management of hypoglycemia involves the rapid delivery of a source of easily absorbed sugar. Regular soda, juice, lifesavers, table sugar, are good  options. 15 grams of glucose is the dose that is given, followed by an assessment of symptoms and a blood glucose check if possible. If after 10 minutes there is no improvement, another 10-15 grams should be given. This can be repeated up to three times. The equivalency of 10-15 grams of glucose (approximate servings) are: 3-5 hard candies, 3 teaspoons of sugar, or 1/2 cup of regular soda or juice.    2. Hypertension - Controlled.    3. Hyperlipidemia - On statin therapy.      Labs ordered today:   Orders Placed This Encounter   Procedures     Hemoglobin A1c       Radiology/Consults ordered today: None    All questions were answered.  The patient indicates understanding of the above issues and agrees with the plan set forth.  Total face to face time greater than or equal to 25 minutes.       Follow-up:  3 months in clinic with kobe Boudreaux NP  Endocrinology  [facility]  CC: Heidy Jama

## 2019-06-07 NOTE — PATIENT INSTRUCTIONS
Your A1c has improved.    Component      Latest Ref Rng & Units 12/5/2018 3/6/2019 6/7/2019   Hemoglobin A1C      0 - 5.6 % 11.9 (H) 9.5 (H) 8.5 (H)     Currently insulin doses: Tresiba 70 units daily and Novolog 25 units with meals + 1 extra unit per 25 points above 150.      Increasing Ozempic to 1 mg weekly.    If you start having low blood sugars, decrease Tresiba back to 60 units per day.    You can also let me know if you have any problems with your blood sugars and you need help adjusting insulin doses.    Follow     Idalia Boudreaux NP  Endocrinology

## 2019-11-07 ENCOUNTER — HEALTH MAINTENANCE LETTER (OUTPATIENT)
Age: 49
End: 2019-11-07

## 2019-12-05 ENCOUNTER — DOCUMENTATION ONLY (OUTPATIENT)
Dept: SLEEP MEDICINE | Facility: CLINIC | Age: 49
End: 2019-12-05

## 2019-12-05 NOTE — PROGRESS NOTES
Patient contacted regarding PAP usage that has either dropped off below an average of 20 minutes per night or device has stopped reporting into Epic or vendor site.         Patient still has device : Yes    Last date device called in 11/14/2019    Last usage date 11/9/2019     Message left for patient to return call       Current reporting of device:    Objective measures: 14 day rolling measures         Compliance 0  %           Action plan:  voicemail left; waiting for return call from patient

## 2019-12-10 DIAGNOSIS — I10 ESSENTIAL HYPERTENSION, BENIGN: ICD-10-CM

## 2019-12-10 DIAGNOSIS — E78.5 HYPERLIPIDEMIA LDL GOAL <100: ICD-10-CM

## 2019-12-10 DIAGNOSIS — F43.23 ADJUSTMENT DISORDER WITH MIXED ANXIETY AND DEPRESSED MOOD: ICD-10-CM

## 2019-12-10 NOTE — LETTER
[facility]  [address] [place], [address]  [phone]        January 3, 2020    Lala George                                                                                                                                                       [address] [place] [id number]            Dear Lala,        We recently received a call from your pharmacy requesting a refill of your medications.      A review of your chart indicates that an appointment is required with your provider for medication check. Please call the clinic at [phone] to schedule your appointment.     We have authorized one refill of your medication to allow time for you to schedule your appointment.     Taking care of your health is important to us and ongoing visits with your provider are vital to your care. We look forward to seeing you in the near future.         Sincerely,     [facility]

## 2019-12-11 RX ORDER — HYDROCHLOROTHIAZIDE 25 MG/1
TABLET ORAL
Qty: 90 TABLET | Refills: 2 | Status: SHIPPED | OUTPATIENT
Start: 2019-12-11 | End: 2020-05-20

## 2019-12-11 RX ORDER — LOSARTAN POTASSIUM 100 MG/1
TABLET ORAL
Qty: 90 TABLET | Refills: 2 | Status: SHIPPED | OUTPATIENT
Start: 2019-12-11 | End: 2020-05-20

## 2019-12-11 RX ORDER — ATORVASTATIN CALCIUM 10 MG/1
TABLET, FILM COATED ORAL
Qty: 90 TABLET | Refills: 0 | Status: SHIPPED | OUTPATIENT
Start: 2019-12-11 | End: 2020-05-20

## 2019-12-11 NOTE — TELEPHONE ENCOUNTER
Biguanide Agents [id number]/10 9:14 PM   Blood pressure less than 140/90 in past 6 months    Patient has documented A1c within the specified period of time.    Recent (6 mo) or future (30 days) visit within the authorizing provider's specialty       No future appt set up, please schedule

## 2019-12-11 NOTE — TELEPHONE ENCOUNTER
"Requested Prescriptions   Pending Prescriptions Disp Refills     losartan (COZAAR) 100 MG tablet [Pharmacy Med Name: LOSARTAN POTASSIUM 100MG TABS] 90 tablet 11     Sig: TAKE ONE TABLET BY MOUTH ONCE DAILY   Last Written Prescription Date:  12/5/2018  Last Fill Quantity: 90,  # refills: 11   Last office visit: 6/3/2019 with prescribing provider:     Future Office Visit:        Angiotensin-II Receptors Passed - 12/10/2019  9:14 PM        Passed - Last blood pressure under 140/90 in past 12 months     BP Readings from Last 3 Encounters:   06/07/19 113/65   06/03/19 116/68   05/03/19 119/76           Passed - Recent (12 mo) or future (30 days) visit within the authorizing provider's specialty     Patient has had an office visit with the authorizing provider or a provider within the authorizing providers department within the previous 12 mos or has a future within next 30 days. See \"Patient Info\" tab in inbasket, or \"Choose Columns\" in Meds & Orders section of the refill encounter.            Passed - Medication is active on med list        Passed - Patient is age 18 or older        Passed - No active pregnancy on record        Passed - Normal serum creatinine on file in past 12 months     Recent Labs   Lab Test 03/06/19  0927   CR 0.59           Passed - Normal serum potassium on file in past 12 months     Recent Labs   Lab Test 03/06/19  0927   POTASSIUM 3.9            Passed - No positive pregnancy test in past 12 months   Prescription approved per [facility] Refill Protocol.       hydrochlorothiazide (HYDRODIURIL) 25 MG tablet [Pharmacy Med Name: HYDROCHLOROTHIAZIDE 25MG TABS] 90 tablet 11     Sig: TAKE ONE TABLET BY MOUTH EVERY MORNING   Last Written Prescription Date:  11/5/2018  Last Fill Quantity: 90,  # refills: 11   Last office visit: 6/3/2019 with prescribing provider:     Future Office Visit:        Diuretics (Including Combos) Protocol Passed - 12/10/2019  9:14 PM        Passed - Blood pressure under 140/90 in " "past 12 months     BP Readings from Last 3 Encounters:   06/07/19 113/65   06/03/19 116/68   05/03/19 119/76           Passed - Recent (12 mo) or future (30 days) visit within the authorizing provider's specialty     Patient has had an office visit with the authorizing provider or a provider within the authorizing providers department within the previous 12 mos or has a future within next 30 days. See \"Patient Info\" tab in inbasket, or \"Choose Columns\" in Meds & Orders section of the refill encounter.          Passed - Medication is active on med list        Passed - Patient is age 18 or older        Passed - No active pregancy on record        Passed - Normal serum creatinine on file in past 12 months     Recent Labs   Lab Test 03/06/19  0927   CR 0.59            Passed - Normal serum potassium on file in past 12 months     Recent Labs   Lab Test 03/06/19  0927   POTASSIUM 3.9            Passed - Normal serum sodium on file in past 12 months     Recent Labs   Lab Test 03/06/19  0927               Passed - No positive pregnancy test in past 12 months   Prescription approved per [facility] [place]ill Protocol.       insulin pen needle (NOVOTWIST) 32G X 5 MM [Pharmacy Med Name: NOVOTWIST 32G X 5 MM MISC] 400 each 3     Sig: USE 4 PEN NEEDLES DAILY AS DIRECTED       Diabetic Supplies Protocol Failed - 12/10/2019  9:14 PM        Failed - Recent (6 mo) or future (30 days) visit within the authorizing provider's specialty     Patient had office visit in the last 6 months or has a visit in the next 30 days with authorizing provider.  See \"Patient Info\" tab in inbasket, or \"Choose Columns\" in Meds & Orders section of the refill encounter.          Passed - Medication is active on med list        Passed - Patient is 18 years of age or older   Prescription approved per [facility] Refill Protocol.       sertraline (ZOLOFT) 50 MG tablet [Pharmacy Med Name: SERTRALINE HCL 50MG TABS] 90 tablet 6     Sig: TAKE ONE TABLET BY MOUTH ONCE DAILY " "  Last Written Prescription Date:  12/5/2018  Last Fill Quantity: 90,  # refills: 6   Last office visit: 6/3/2019 with prescribing provider:     Future Office Visit:        SSRIs Protocol Failed - 12/10/2019  9:14 PM        Failed - PHQ-9 score less than 5 in past 6 months     Please review last PHQ-9 score.   PHQ-9 score:    PHQ-9 SCORE 6/3/2019   PHQ-9 Total Score 0           Failed - Recent (6 mo) or future (30 days) visit within the authorizing provider's specialty     Patient had office visit in the last 6 months or has a visit in the next 30 days with authorizing provider or within the authorizing provider's specialty.  See \"Patient Info\" tab in inbasket, or \"Choose Columns\" in Meds & Orders section of the refill encounter.            Passed - Medication is active on med list        Passed - Patient is age 18 or older        Passed - No active pregnancy on record        Passed - No positive pregnancy test in last 12 months   Medication is being filled for 1 time refill only due to:  PHQ9 score needs to updated       atorvastatin (LIPITOR) 10 MG tablet [Pharmacy Med Name: ATORVASTATIN CALCIUM 10MG TABS] 90 tablet 11     Sig: TAKE ONE TABLET BY MOUTH ONCE DAILY   Last Written Prescription Date:  12/5/2018  Last Fill Quantity: 90,  # refills: 11   Last office visit: 6/3/2019 with prescribing provider:     Future Office Visit:        Statins Protocol Passed - 12/10/2019  9:14 PM        Passed - LDL on file in past 12 months     Recent Labs   Lab Test 03/16/19  1027   LDL 54           Passed - No abnormal creatine kinase in past 12 months     No lab results found.           Passed - Recent (12 mo) or future (30 days) visit within the authorizing provider's specialty     Patient has had an office visit with the authorizing provider or a provider within the authorizing providers department within the previous 12 mos or has a future within next 30 days. See \"Patient Info\" tab in inbasket, or \"Choose Columns\" in Meds & " Orders section of the refill encounter.          Passed - Medication is active on med list        Passed - Patient is age 18 or older        Passed - No active pregnancy on record        Passed - No positive pregnancy test in past 12 months      Prescription approved per [facility] Refill Protocol.  Etta Metcalf RN

## 2019-12-12 RX ORDER — METFORMIN HCL 500 MG
2000 TABLET, EXTENDED RELEASE 24 HR ORAL
Qty: 360 TABLET | Refills: 0 | Status: SHIPPED | OUTPATIENT
Start: 2019-12-12 | End: 2020-04-09

## 2019-12-12 RX ORDER — FLASH GLUCOSE SENSOR
KIT MISCELLANEOUS
Qty: 2 EACH | Refills: 11 | Status: SHIPPED | OUTPATIENT
Start: 2019-12-12 | End: 2020-11-06

## 2020-02-07 NOTE — TELEPHONE ENCOUNTER
Pt calling to schedule appt with Idalia Boudreaux, scheduled her with Idalia cornell available on 4/9/2020, will need refill of these 2 medications before appt. [facility] has 1 pen left of tresiba and 2 pens of novolog.               insulin degludec (TRESIBA) 200 UNIT/ML pen  Last Written Prescription Date:  3/7/2019  Last Fill Quantity: 27 mL,  # refills: 11  Last office visit: 6/7/2019 with prescribing provider:   Idalia Boudreaux  Future Office Visit:   Next 5 appointments (look out 90 days)    Apr 09, 2020  7:30 AM CDT  Return Visit with EDMOND Velez CNP  [facility] [address]  [facility] [address]  [phone]                   insulin aspart (NOVOLOG FLEXPEN) 100 UNIT/ML pen  Last Written Prescription Date:  3/7/2019  Last Fill Quantity: 75 mL,  # refills: 3   Last office visit: 6/7/2019 with prescribing provider:  Idalia Boudreaux   Future Office Visit:   Next 5 appointments (look out 90 days)    Apr 09, 2020  7:30 AM CDT  Return Visit with EDMOND Velez CNP  [facility] [address]  [facility] [address]  [phone]

## 2020-02-12 NOTE — TELEPHONE ENCOUNTER
"Requested Prescriptions   Pending Prescriptions Disp Refills     insulin aspart (NOVOLOG FLEXPEN) 100 UNIT/ML pen 75 mL 3     Si units before each meal + 1 unit per 25 points of blood sugar above 150. Total daily dose 80 units per day       Short Acting Insulin Protocol Failed - 2020  8:41 AM        Failed - Blood pressure less than 140/90 in past 6 months     BP Readings from Last 3 Encounters:   19 113/65   19 116/19 119/76                 Failed - HgbA1C in past 3 or 6 months     If HgbA1C is 8 or greater, it needs to be on file within the past 3 months.  If less than 8, must be on file within the past 6 months.     Recent Labs   Lab Test 19  0703   A1C 8.5*             Failed - Recent (6 mo) or future (30 days) visit within the authorizing provider's specialty     Patient had office visit in the last 6 months or has a visit in the next 30 days with authorizing provider or within the authorizing provider's specialty.  See \"Patient Info\" tab in inbasket, or \"Choose Columns\" in Meds & Orders section of the refill encounter.            Passed - LDL on file in past 12 months     Recent Labs   Lab Test 19  1027   LDL 54             Passed - Microalbumin on file in past 12 months     Recent Labs   Lab Test 19  1026   MICROL 5   UMALCR 11.67             Passed - Serum creatinine on file in past 12 months     Recent Labs   Lab Test 19  0927   CR 0.59             Passed - Medication is active on med list        Passed - Patient is age 18 or older        insulin degludec (TRESIBA) 200 UNIT/ML pen 27 mL 11     Sig: Inject 70 Units Subcutaneous daily       Long Acting Insulin Protocol Failed - 2020  8:41 AM        Failed - Blood pressure less than 140/90 in past 6 months     BP Readings from Last 3 Encounters:   19 113/65   19/19 119/76                 Failed - HgbA1C in past 3 or 6 months     If HgbA1C is 8 or greater, it needs to be on " "file within the past 3 months.  If less than 8, must be on file within the past 6 months.     Recent Labs   Lab Test 06/07/19  0703   A1C 8.5*             Failed - Recent (6 mo) or future (30 days) visit within the authorizing provider's specialty     Patient had office visit in the last 6 months or has a visit in the next 30 days with authorizing provider or within the authorizing provider's specialty.  See \"Patient Info\" tab in inbasket, or \"Choose Columns\" in Meds & Orders section of the refill encounter.            Passed - LDL on file in past 12 months     Recent Labs   Lab Test 03/16/19  1027   LDL 54             Passed - Microalbumin on file in past 12 months     Recent Labs   Lab Test 03/16/19  1026   MICROL 5   UMALCR 11.67             Passed - Serum creatinine on file in past 12 months     Recent Labs   Lab Test 03/06/19  0927   CR 0.59             Passed - Medication is active on med list        Passed - Patient is age 18 or older          Medication is being filled for 1 time refill only due to:  Patient needs to be seen because due for diabetes follow up and labs.    Prescription approved per [facility] [place].    Thalia LUBINN, RN, PHN      "

## 2020-02-17 ENCOUNTER — HEALTH MAINTENANCE LETTER (OUTPATIENT)
Age: 50
End: 2020-02-17

## 2020-02-21 ENCOUNTER — TELEPHONE (OUTPATIENT)
Dept: PEDIATRICS | Facility: CLINIC | Age: 50
End: 2020-02-21

## 2020-02-21 NOTE — TELEPHONE ENCOUNTER
Forms/Letter Request  Name of form/letter: DMV Forms   Have you been seen for this request: N/A  Do we have the form/letter: Yes: Pt dropped forms at  2nd Floor  When is form/letter needed by: ASAP  How would you like the form/letter returned:   Patient Notified form requests are processed in 3-5 business days:Yes  Okay to leave a detailed message? Yes Home number on file [phone] (home)

## 2020-03-03 ENCOUNTER — TRANSFERRED RECORDS (OUTPATIENT)
Dept: HEALTH INFORMATION MANAGEMENT | Facility: CLINIC | Age: 50
End: 2020-03-03

## 2020-04-09 ENCOUNTER — VIRTUAL VISIT (OUTPATIENT)
Dept: ENDOCRINOLOGY | Facility: CLINIC | Age: 50
End: 2020-04-09
Payer: COMMERCIAL

## 2020-04-09 DIAGNOSIS — E78.5 HYPERLIPIDEMIA LDL GOAL <100: ICD-10-CM

## 2020-04-09 DIAGNOSIS — I10 ESSENTIAL HYPERTENSION: ICD-10-CM

## 2020-04-09 PROCEDURE — 99213 OFFICE O/P EST LOW 20 MIN: CPT | Mod: 95 | Performed by: CLINICAL NURSE SPECIALIST

## 2020-04-09 RX ORDER — METFORMIN HCL 500 MG
2000 TABLET, EXTENDED RELEASE 24 HR ORAL
Qty: 360 TABLET | Refills: 3 | Status: SHIPPED | OUTPATIENT
Start: 2020-04-09 | End: 2022-02-10

## 2020-04-09 RX ORDER — INSULIN ASPART 100 [IU]/ML
INJECTION, SOLUTION INTRAVENOUS; SUBCUTANEOUS
Qty: 30 ML | Refills: 5 | Status: SHIPPED | OUTPATIENT
Start: 2020-04-09 | End: 2022-02-08

## 2020-04-09 NOTE — PROGRESS NOTES
"Subjective     Lala George is a 49 year old female who is being evaluated via a billable telephone visit.      The patient has been notified of following:     \"This telephone visit will be conducted via a call between you and your physician/provider. We have found that certain health care needs can be provided without the need for a physical exam.  This service lets us provide the care you need with a short phone conversation.  If a prescription is necessary we can send it directly to your pharmacy.  If lab work is needed we can place an order for that and you can then stop by our lab to have the test done at a later time.    Telephone visits are billed at different rates depending on your insurance coverage. During this emergency period, for some insurers they may be billed the same as an in-person visit.  Please reach out to your insurance provider with any questions.    If during the course of the call the physician/provider feels a telephone visit is not appropriate, you will not be charged for this service.\"      How would you like to obtain your AVS? Renetta George complains of   Chief Complaint   Patient presents with     Diabetes     Name: Lala George  Seen at the request of Heidy Jama for Diabetes (6/7/2019).  HPI:  Lala George is a 49 year old female who presents for the management of:    1. Type 2 DM:  Originally diagnosed: 12/2014.  Glucose slightly elevated 109-113 the year prior to diagnosis.  Glucose at the time of diagnosis was 317.    Current Regimen: Metformin XR 2000 mg/day, Tresiba 60 units q hs, Novolog 25 units tid +1:25>150.  Ozempic 1.0 mg weekly.  Ozempic dose was increased 6/2019 - ran out and was off for a month or     Not consistent about taking Novolog - forgets.    Initially treated with Metformin, Lantus insulin was added a short time after diagnosis. Lantus was later changed to Basaglar due to insurance, then changed to Tresiba.  Humalog then added.  Was briefly " treated with glipizide in 2015, for about 2-3 months, discontinued due to ineffectiveness  Trulicity was added 4/2017, this was later changed to Tanzeum due to insurance formulary. Tanzeum no longer available.  Started Ozempic 3/2019      BS checks: Continuous, Sandi personal CGM  Sandi: Unable to access Sandi remotely today.      Works night shift 10 pm to 6 am, Tuesday - Sunday.    Complications:   Diabetes Complications  Description / Detail    Diabetic Retinopathy  No retinopathy, last eye exam 5/13/2019   CAD / PAD  No   Neuropathy  No   Nephropathy / Microalbuminuria  Elevated urine microalbumin x 1   Gastroparesis  No   Hypoglycemia Unawareness  No     2. Hypertension: Blood Pressure:   BP Readings from Last 3 Encounters:   06/07/19 113/65   06/03/19 116/68   05/03/19 119/76   .  Blood pressure medications include losartan 100 mg qd, HCTZ 25 mg qd.  .    3. Hyperlipidemia: Takes atorvastatin 10 mg qd for lipid control.  Denies muscle aches of pains.       4. Prevention:  Flu Shot- 8/2018  Pneumovax- 3/2/2015  Opthalmology-Yes: 5/2019  Dental-recommend semiannually  ASA-No  Smoking- No  PMH/PSH:  Past Medical History:   Diagnosis Date     BENIGN HYPERTENSION 2/15/2006     Diabetes (H)      Mild persistent asthma      Sleep apnea 2018    c pap     Past Surgical History:   Procedure Laterality Date     COLONOSCOPY N/A 5/3/2019    Procedure: COLONOSCOPY;  Surgeon: Steffi Ruggiero MD;  Location:  GI     NO HISTORY OF SURGERY       Family Hx:  Family History   Problem Relation Age of Onset     Breast Cancer Mother 30        first diagnosed in her early 30's     Hypertension Mother      Diabetes Mother      C.A.D. Mother         CHF 50s ,pacemaker late 50s, CABG 60s     C.A.D. Maternal Aunt         60s     Glaucoma No family hx of      Macular Degeneration No family hx of      Thyroid disease: No         DM2: Yes: mother and one sibling         Autoimmune: DM1, SLE, RA, Vitiligo No    Social Hx:  Social  History     Socioeconomic History     Marital status: Single     Spouse name: Not on file     Number of children: Not on file     Years of education: Not on file     Highest education level: Not on file   Occupational History     Employer: OTHER     Comment: part time, [employer]   Social Needs     Financial resource strain: Not on file     Food insecurity     Worry: Not on file     Inability: Not on file     Transportation needs     Medical: Not on file     Non-medical: Not on file   Tobacco Use     Smoking status: Never Smoker     Smokeless tobacco: Never Used   Substance and Sexual Activity     Alcohol use: Yes     Comment: rare--one to two per week     Drug use: No     Sexual activity: Never   Lifestyle     Physical activity     Days per week: Not on file     Minutes per session: Not on file     Stress: Not on file   Relationships     Social connections     Talks on phone: Not on file     Gets together: Not on file     Attends [employer] service: Not on file     Active member of club or organization: Not on file     Attends meetings of clubs or organizations: Not on file     Relationship status: Not on file     Intimate partner violence     Fear of current or ex partner: Not on file     Emotionally abused: Not on file     Physically abused: Not on file     Forced sexual activity: Not on file   Other Topics Concern     Parent/sibling w/ CABG, MI or angioplasty before 65F 55M? Not Asked   Social History Narrative     Not on file          MEDICATIONS:  has a current medication list which includes the following prescription(s): albuterol, atorvastatin, blood glucose monitoring, blood glucose monitoring, blood glucose, blood glucose, freestyle estiven 14 day reader, freestyle estiven 14 day sensor, fluocinonide, fluocinonide, hydrochlorothiazide, insulin aspart, insulin degludec, insulin pen needle, losartan, metformin, semaglutide, and sertraline.     ALLERGIES  Bacitracin    ROS     ROS: 10 point ROS neg other than the  symptoms noted above in the HPI.    Objective   Reported vitals:  LMP 01/18/2013    Psych: Alert and oriented times 3; coherent speech, normal   rate and volume, able to articulate logical thoughts, able   to abstract reason, no tangential thoughts, no hallucinations   or delusions  Her affect is pleasant and cooperative,    LABS:  A1c:   Component      Latest Ref Rng & Units 12/5/2018 3/6/2019 6/7/2019   Hemoglobin A1C      0 - 5.6 % 11.9 (H) 9.5 (H) 8.5 (H)     Basic Metabolic Panel:  !COMPREHENSIVE Latest Ref Rng & Units 3/6/2019   SODIUM 133 - 144 mmol/L 138   POTASSIUM 3.4 - 5.3 mmol/L 3.9   CHLORIDE 94 - 109 mmol/L 103   BUN 7 - 30 mg/dL 20   Creatinine 0.52 - 1.04 mg/dL 0.59   Glucose 70 - 99 mg/dL 175 (H)   ANION GAP 3 - 14 mmol/L 4   CALCIUM 8.5 - 10.1 mg/dL 9.3   ALBUMIN 3.4 - 5.0 g/dL 4.3     LFTS/Cholesterol Panel:  !LIPID/HEPATIC Latest Ref Rng & Units 3/6/2019 3/16/2019   CHOLESTEROL <200 mg/dL  134   TRIGLYCERIDES <150 mg/dL  151 (H)   HDL CHOLESTEROL >49 mg/dL  49 (L)   LDL CHOLESTEROL, CALCULATED <100 mg/dL  54   VLDL-CHOLESTEROL 0 - 30 mg/dL     NON HDL CHOLESTEROL <130 mg/dL  84   CHOLESTEROL/HDL RATIO 0.0 - 5.0     AST 0 - 45 U/L 34    ALT 0 - 50 U/L 52 (H)      Thyroid Function:   !LIPID/HEPATIC Latest Ref Rng & Units 3/6/2019 3/16/2019   CHOLESTEROL <200 mg/dL  134   TRIGLYCERIDES <150 mg/dL  151 (H)   HDL CHOLESTEROL >49 mg/dL  49 (L)   LDL CHOLESTEROL, CALCULATED <100 mg/dL  54   VLDL-CHOLESTEROL 0 - 30 mg/dL     NON HDL CHOLESTEROL <130 mg/dL  84   CHOLESTEROL/HDL RATIO 0.0 - 5.0     AST 0 - 45 U/L 34    ALT 0 - 50 U/L 52 (H)      Urine MicroAlbumin:  Component      Latest Ref Rng & Units 3/16/2019   Creatinine Urine      mg/dL 43   Albumin Urine mg/L      mg/L 5   Albumin Urine mg/g Cr      0 - 25 mg/g Cr 11.67     Vitamin D:    All pertinent notes, labs, and images personally reviewed by me.     A/P  Ms.Shari George is a 49 year old being evaluated via phone visit due to COVID-19:    1.  DM2 - Level of control unknown, no BG data for review today.  Continue Ozempic to 1 mg weekly  Increase Tresiba 2 units at a time to target fasting glucose 100-130.  Continue Novolog to 25 units tid + 1:25>150.  Take Novolog consistently before each meal.  Continue 14-day Sandi  Contact the clinic to have Sandi report printed for review in another 2 weeks.    Previously discussed insulin pump therapy - demo Omnipod was provided - is interested.  Follow up with diabetes ed for pre-pump education - was previously scheduled but had to cancel - plans to reschedule after COVID-19 restrictions are lifted.  There is some variability among people, most will usually develop symptoms suggestive of hypoglycemia when blood glucose levels are lowered to the mid 60's. The first set of symptoms are called adrenergic. Patients may experience any of the following nervousness, sweating, intense hunger, trembling, weakness, palpitations, and difficulty speaking.   The acute management of hypoglycemia involves the rapid delivery of a source of easily absorbed sugar. Regular soda, juice, lifesavers, table sugar, are good options. 15 grams of glucose is the dose that is given, followed by an assessment of symptoms and a blood glucose check if possible. If after 10 minutes there is no improvement, another 10-15 grams should be given. This can be repeated up to three times. The equivalency of 10-15 grams of glucose (approximate servings) are: 3-5 hard candies, 3 teaspoons of sugar, or 1/2 cup of regular soda or juice.      2. Hypertension - Historically controlled.    3. Hyperlipidemia - On statin therapy.      Labs ordered today:   Orders Placed This Encounter   Procedures     Comprehensive metabolic panel     TSH with free T4 reflex     Albumin Random Urine Quantitative with Creat Ratio     Lipid panel reflex to direct LDL Fasting     Hemoglobin A1c       Radiology/Consults ordered today: None       Follow-up:  3-4 months in clinic with  kobe Boudreaux NP  Endocrinology  [facility]  CC: Heidy Jama     Phone call duration:  11 minutes.  Call start time: 7:38 am; end time: 7:49 am.

## 2020-04-09 NOTE — PATIENT INSTRUCTIONS
Lala,  Here is a summary of what we discussed:    1.  You can increase the Tresiba by 2 units at a time to target the blood sugar you wake up with (fasting blood sugar) 100-130's.    2.  When COVID-19 restrictions have been lifted, please schedule a fasting lab appointment.  Fasting means nothing to eat or drink, other than water, for 8-12 hours before the labs.    3.  In two weeks, please message Coral, my medical assistant, so she can print your Sandi report for my review.  I'll let you know if I recommend any additional changes.  If you remember, let Coral know what your dose of Tresiba is if you have increased it.    Follow up Friday 8/14, check in at 6:45 am.    Idalia Boudreaux NP  Endocrinology

## 2020-05-20 ENCOUNTER — VIRTUAL VISIT (OUTPATIENT)
Dept: PEDIATRICS | Facility: CLINIC | Age: 50
End: 2020-05-20
Payer: COMMERCIAL

## 2020-05-20 DIAGNOSIS — F43.23 ADJUSTMENT DISORDER WITH MIXED ANXIETY AND DEPRESSED MOOD: ICD-10-CM

## 2020-05-20 DIAGNOSIS — E78.5 HYPERLIPIDEMIA LDL GOAL <100: ICD-10-CM

## 2020-05-20 DIAGNOSIS — I10 ESSENTIAL HYPERTENSION, BENIGN: ICD-10-CM

## 2020-05-20 PROCEDURE — 99214 OFFICE O/P EST MOD 30 MIN: CPT | Mod: 95 | Performed by: INTERNAL MEDICINE

## 2020-05-20 RX ORDER — LOSARTAN POTASSIUM 100 MG/1
100 TABLET ORAL DAILY
Qty: 90 TABLET | Refills: 3 | Status: SHIPPED | OUTPATIENT
Start: 2020-05-20 | End: 2022-02-10

## 2020-05-20 RX ORDER — HYDROCHLOROTHIAZIDE 25 MG/1
25 TABLET ORAL EVERY MORNING
Qty: 90 TABLET | Refills: 11 | Status: SHIPPED | OUTPATIENT
Start: 2020-05-20 | End: 2022-02-10

## 2020-05-20 RX ORDER — ATORVASTATIN CALCIUM 10 MG/1
10 TABLET, FILM COATED ORAL DAILY
Qty: 90 TABLET | Refills: 3 | Status: SHIPPED | OUTPATIENT
Start: 2020-05-20 | End: 2022-02-10

## 2020-05-20 NOTE — PROGRESS NOTES
"Lala George is a 50 year old female who is being evaluated via a billable video visit.      The patient has been notified of following:     \"This video visit will be conducted via a call between you and your physician/provider. We have found that certain health care needs can be provided without the need for an in-person physical exam.  This service lets us provide the care you need with a video conversation.  If a prescription is necessary we can send it directly to your pharmacy.  If lab work is needed we can place an order for that and you can then stop by our lab to have the test done at a later time.    Video visits are billed at different rates depending on your insurance coverage.  Please reach out to your insurance provider with any questions.    If during the course of the call the physician/provider feels a video visit is not appropriate, you will not be charged for this service.\"    Patient has given verbal consent for Video visit? {YES-NO  Default Yes:4444::\"Yes\"}    How would you like to obtain your AVS? {AVS Preference:110035}    Patient would like the video invitation sent by: {video visit invitation:763615}    Will anyone else be joining your video visit? {:542205}  {If patient encounters technical issues they should call [phone] :831071}    Subjective     Lala George is a 50 year old female who presents today via video visit for the following health issues:    HPI  {SUPERLIST (Optional):008552}  {PEDS Chronic and Acute Problems (Optional):976657}     Video Start Time: {video visit start/end time for provider to select:264719}    {additonal problems for provider to add (Optional):175705}    {HIST REVIEW/ LINKS 2 (Optional):233168}    Reviewed and updated as needed this visit by Provider         Review of Systems   {ROS COMP (Optional):729189}      Objective    LMP 01/18/2013   Estimated body mass index is 38.91 kg/m  as calculated from the following:    Height as of 6/3/19: 1.651 m (5' 5\").    " "Weight as of 6/7/19: 106.1 kg (233 lb 12.8 oz).  Physical Exam     {video visit exam brief selected:469342::\"GENERAL: Healthy, alert and no distress\",\"EYES: Eyes grossly normal to inspection.  No discharge or erythema, or obvious scleral/conjunctival abnormalities.\",\"RESP: No audible wheeze, cough, or visible cyanosis.  No visible retractions or increased work of breathing.  \",\"SKIN: Visible skin clear. No significant rash, abnormal pigmentation or lesions.\",\"NEURO: Cranial nerves grossly intact.  Mentation and speech appropriate for age.\",\"PSYCH: Mentation appears normal, affect normal/bright, judgement and insight intact, normal speech and appearance well-groomed.\"}      {Diagnostic Test Results (Optional):386951::\"Diagnostic Test Results:\",\"Labs reviewed in Epic\"}        {PROVIDER CHARTING PREFERENCE:403426}      Video-Visit Details    Type of service:  Video Visit    Video End Time:{video visit start/end time for provider to select:322044}    Originating Location (pt. Location): {video visit patient location:947073::\"Home\"}    Distant Location (provider location):  [facility]     Platform used for Video Visit: {Virtual Visit Platforms:283867::\"[employer]\"}    No follow-ups on file.       {signature options:172660}      "

## 2020-05-20 NOTE — PROGRESS NOTES
"Lala George is a 50 year old female who is being evaluated via a billable telephone visit.      The patient has been notified of following:     \"This telephone visit will be conducted via a call between you and your physician/provider. We have found that certain health care needs can be provided without the need for a physical exam.  This service lets us provide the care you need with a short phone conversation.  If a prescription is necessary we can send it directly to your pharmacy.  If lab work is needed we can place an order for that and you can then stop by our lab to have the test done at a later time.    Telephone visits are billed at different rates depending on your insurance coverage. During this emergency period, for some insurers they may be billed the same as an in-person visit.  Please reach out to your insurance provider with any questions.    If during the course of the call the physician/provider feels a telephone visit is not appropriate, you will not be charged for this service.\"    Patient has given verbal consent for Telephone visit?  Yes    What phone number would you like to be contacted at? [phone]     How would you like to obtain your AVS? Renetta Wilson     Lala George is a 50 year old female who presents via phone visit today for the following health issues:    HPI  Diabetes Follow-up    How often are you checking your blood sugar? Continuous glucose monitor daniel checking 3x's per day  What time of day are you checking your blood sugars (select all that apply)?  continuous  Have you had any blood sugars above 200?  Yes rarely  Have you had any blood sugars below 70?  Yes couple times, has glucose tablets    What symptoms do you notice when your blood sugar is low?  Feels really sick    What concerns do you have today about your diabetes?  Other: she knows she needs to watch carefully     Do you have any of these symptoms? (Select all that apply)  No numbness or tingling in " feet.  No redness, sores or blisters on feet.  No complaints of excessive thirst.  No reports of blurry vision.  No significant changes to weight.    Have you had a diabetic eye exam in the last 12 months? No 5/13/2019      Hyperlipidemia Follow-Up      Are you regularly taking any medication or supplement to lower your cholesterol?   Yes- atorvastatin    Are you having muscle aches or other side effects that you think could be caused by your cholesterol lowering medication?  No    Hypertension Follow-up      Do you check your blood pressure regularly outside of the clinic? Yes  But not recently    Are you following a low salt diet? Yes    Are your blood pressures ever more than 140 on the top number (systolic) OR more   than 90 on the bottom number (diastolic), for example 140/90? Not taking    BP Readings from Last 2 Encounters:   06/07/19 113/65   06/03/19 116/68     Hemoglobin A1C (%)   Date Value   06/07/2019 8.5 (H)   03/06/2019 9.5 (H)     LDL Cholesterol Calculated (mg/dL)   Date Value   03/16/2019 54   02/21/2018 84         How many servings of fruits and vegetables do you eat daily?  2-3    On average, how many sweetened beverages do you drink each day (Examples: soda, juice, sweet tea, etc.  Do NOT count diet or artificially sweetened beverages)?   0    How many days per week do you exercise enough to make your heart beat faster? 1-2    How many minutes a day do you exercise enough to make your heart beat faster? 10 - 19  How many days per week do you miss taking your medication? 1    What makes it hard for you to take your medications?  remembering to take      Patient Active Problem List   Diagnosis     Mild persistent asthma     Essential hypertension     Dyslipidemia     Family history of breast cancer in mother     Atopic dermatitis     Morbid obesity (H)     Abnormal liver enzymes     High triglycerides     Uncontrolled type 2 diabetes mellitus without complication, with long-term current use of  insulin (H)     Hyperlipidemia LDL goal <100     LUCIO (obstructive sleep apnea)     Past Surgical History:   Procedure Laterality Date     COLONOSCOPY N/A 5/3/2019    Procedure: COLONOSCOPY;  Surgeon: Steffi Ruggiero MD;  Location: [facility]     NO HISTORY OF SURGERY         Social History     Tobacco Use     Smoking status: Never Smoker     Smokeless tobacco: Never Used   Substance Use Topics     Alcohol use: Yes     Comment: rare--one to two per week     Family History   Problem Relation Age of Onset     Breast Cancer Mother 30        first diagnosed in her early 30's     Hypertension Mother      Diabetes Mother      C.A.D. Mother         CHF 50s ,pacemaker late 50s, CABG 60s     C.A.D. Maternal Aunt         60s     Glaucoma No family hx of      Macular Degeneration No family hx of            Reviewed and updated as needed this visit by Provider         Review of Systems   Constitutional, HEENT, cardiovascular, pulmonary, gi and gu systems are negative, except as otherwise noted.       Objective   Reported vitals:  LMP 01/18/2013    healthy, alert and no distress  PSYCH: Alert and oriented times 3; coherent speech, normal   rate and volume, able to articulate logical thoughts, able   to abstract reason, no tangential thoughts, no hallucinations   or delusions  Her affect is normal  RESP: No cough, no audible wheezing, able to talk in full sentences  Remainder of exam unable to be completed due to telephone visits    Diagnostic Test Results:  Labs reviewed in Epic        Assessment/Plan:  1. Uncontrolled type 2 diabetes mellitus without complication, with long-term current use of insulin (H)  Continues to work with endocrine. Looking at getting insulin pump. She is in favor, but has been putting off calling on this, just not sure why. Discussed that if she doesn't like it, she can take it off and go back to injections.  - atorvastatin (LIPITOR) 10 MG tablet; Take 1 tablet (10 mg) by mouth daily  Dispense: 90  tablet; Refill: 3  - hydrochlorothiazide (HYDRODIURIL) 25 MG tablet; Take 1 tablet (25 mg) by mouth every morning  Dispense: 90 tablet; Refill: 11  - losartan (COZAAR) 100 MG tablet; Take 1 tablet (100 mg) by mouth daily  Dispense: 90 tablet; Refill: 3    2. Hyperlipidemia LDL goal <100  Tolerating statin  - atorvastatin (LIPITOR) 10 MG tablet; Take 1 tablet (10 mg) by mouth daily  Dispense: 90 tablet; Refill: 3    3. Essential hypertension, benign  Good control  - hydrochlorothiazide (HYDRODIURIL) 25 MG tablet; Take 1 tablet (25 mg) by mouth every morning  Dispense: 90 tablet; Refill: 11  - losartan (COZAAR) 100 MG tablet; Take 1 tablet (100 mg) by mouth daily  Dispense: 90 tablet; Refill: 3    4. Adjustment disorder with mixed anxiety and depressed mood  Stable.  - sertraline (ZOLOFT) 50 MG tablet; Take 1 tablet (50 mg) by mouth daily  Dispense: 90 tablet; Refill: 11    No follow-ups on file.      Phone call duration:  22 minutes    Heidy Jama MD

## 2020-05-22 ENCOUNTER — TELEPHONE (OUTPATIENT)
Dept: ENDOCRINOLOGY | Facility: CLINIC | Age: 50
End: 2020-05-22

## 2020-05-22 NOTE — TELEPHONE ENCOUNTER
Heidy calling back from [facility] pharmacy stating that we do not need to do anything further with this. The Rx is reported out as two different strengths and can use the current Rx.    Will update provider.    Myra TAPIA CMA

## 2020-05-22 NOTE — TELEPHONE ENCOUNTER
[facility] calling questioning strength of Ozempic. Last order was Rx 2mg/1.5 ml. Patient has been well managed at the 1mg. LOV per Idalia Boudreaux to  Continue Ozempic to 1 mg weekly.     Will route to provider for clarification. If should still be the 1mg dosing, please send in new ERx.    Myra TAPIA CMA

## 2020-06-21 ENCOUNTER — MYC MEDICAL ADVICE (OUTPATIENT)
Dept: ENDOCRINOLOGY | Facility: CLINIC | Age: 50
End: 2020-06-21

## 2020-06-21 DIAGNOSIS — Z79.4 TYPE 2 DIABETES MELLITUS WITHOUT COMPLICATION, WITH LONG-TERM CURRENT USE OF INSULIN (H): ICD-10-CM

## 2020-06-21 DIAGNOSIS — E11.9 TYPE 2 DIABETES MELLITUS WITHOUT COMPLICATION, WITH LONG-TERM CURRENT USE OF INSULIN (H): ICD-10-CM

## 2020-06-24 NOTE — TELEPHONE ENCOUNTER
Please print new referral provided today.  A comment stating the referral is for a medical diabetic eye exam and the Dx code provided on the referral, however please let her know it is up to the eye doctor to code and bill as a medical eye exam so she might discuss this with them.  Thanks,  Idalia Boudreaux NP  Endocrinology

## 2020-07-29 ENCOUNTER — TRANSFERRED RECORDS (OUTPATIENT)
Dept: HEALTH INFORMATION MANAGEMENT | Facility: CLINIC | Age: 50
End: 2020-07-29

## 2020-07-29 LAB — RETINOPATHY: NEGATIVE

## 2020-08-28 NOTE — TELEPHONE ENCOUNTER
Pt sent a [employer] message requesting an alternative medication for her scalp psoriasis, which is not improving.  Appears as a derm referral has already been placed.    Please advise-    Margaret Fairbanks RN     To Dr Canada  [facility] 4/10/20  Last bmp 2019  Pending comp

## 2020-09-10 ENCOUNTER — TELEPHONE (OUTPATIENT)
Dept: PEDIATRICS | Facility: CLINIC | Age: 50
End: 2020-09-10

## 2020-09-10 NOTE — TELEPHONE ENCOUNTER
Patient Quality Outreach      Summary:    Patient is due/failing the following:   A1C, LDL (Fasting), Microalbumin and Diabetic Follow-Up Visit and ACT needed, Asthma follow-up visit and AAP    Type of outreach:    Sent [employer] message.    Questions for provider review:    None                                                                                   **Start Working phrase here:**       Patient has the following on her problem list/HM:     Asthma review       ACT Total Scores 3/6/2019   ACT TOTAL SCORE -   ASTHMA ER VISITS -   ASTHMA HOSPITALIZATIONS -   ACT TOTAL SCORE (Goal Greater than or Equal to 20) 22   In the past 12 months, how many times did you visit the emergency room for your asthma without being admitted to the hospital? 0   In the past 12 months, how many times were you hospitalized overnight because of your asthma? 0          Diabetes    Last A1C:  Lab Results   Component Value Date    A1C 8.5 06/07/2019    A1C 9.5 03/06/2019       Last LDL:    Lab Results   Component Value Date    LDL 54 03/16/2019       Is the patient on a Statin? Yes          Is the patient on Aspirin? no    Medications     HMG CoA Reductase Inhibitors     atorvastatin (LIPITOR) 10 MG tablet             Last three blood pressure readings:  BP Readings from Last 3 Encounters:   06/07/19 113/65   06/03/19 116/68   05/03/19 119/76            Tobacco Use      Smoking status: Never Smoker      Smokeless tobacco: Never Used                                                      Geneva Pelaez LPN       Chart routed to

## 2020-10-19 ENCOUNTER — MYC MEDICAL ADVICE (OUTPATIENT)
Dept: PEDIATRICS | Facility: CLINIC | Age: 50
End: 2020-10-19

## 2020-10-19 DIAGNOSIS — Z12.31 VISIT FOR SCREENING MAMMOGRAM: Primary | ICD-10-CM

## 2020-10-22 NOTE — TELEPHONE ENCOUNTER
Patient sent [employer] message requesting orders so they can schedule Mammo. Orders pended for provider review.    Emeterio Brooks at 8:34 AM on 10/22/2020  [facility]  Phone [phone]

## 2020-11-06 DIAGNOSIS — E11.65 TYPE 2 DIABETES MELLITUS WITH HYPERGLYCEMIA (H): ICD-10-CM

## 2020-11-06 DIAGNOSIS — E11.9 TYPE 2 DIABETES MELLITUS WITHOUT COMPLICATION, WITH LONG-TERM CURRENT USE OF INSULIN (H): Primary | ICD-10-CM

## 2020-11-06 DIAGNOSIS — Z79.4 TYPE 2 DIABETES MELLITUS WITHOUT COMPLICATION, WITH LONG-TERM CURRENT USE OF INSULIN (H): Primary | ICD-10-CM

## 2020-11-06 NOTE — TELEPHONE ENCOUNTER
Routing refill request to provider for review/approval because:  Drug not on the [facility] refill protocol     Brenda Eddy RN

## 2020-11-11 RX ORDER — FLASH GLUCOSE SENSOR
KIT MISCELLANEOUS
Qty: 2 EACH | Refills: 11 | Status: SHIPPED | OUTPATIENT
Start: 2020-11-11 | End: 2021-11-09

## 2020-11-16 ENCOUNTER — HOSPITAL ENCOUNTER (OUTPATIENT)
Dept: MAMMOGRAPHY | Facility: CLINIC | Age: 50
Discharge: HOME | End: 2020-11-16
Attending: INTERNAL MEDICINE | Admitting: INTERNAL MEDICINE
Payer: COMMERCIAL

## 2020-11-16 DIAGNOSIS — Z12.31 OTHER SCREENING MAMMOGRAM: ICD-10-CM

## 2020-11-16 PROCEDURE — 77067 SCR MAMMO BI INCL CAD: CPT

## 2020-11-29 ENCOUNTER — HEALTH MAINTENANCE LETTER (OUTPATIENT)
Age: 50
End: 2020-11-29

## 2021-04-23 ENCOUNTER — IMMUNIZATION (OUTPATIENT)
Dept: NURSING | Facility: CLINIC | Age: 51
End: 2021-04-23
Payer: COMMERCIAL

## 2021-04-23 PROCEDURE — 0001A PR COVID VAC PFIZER DIL RECON 30 MCG/0.3 ML IM: CPT

## 2021-04-23 PROCEDURE — 91300 PR COVID VAC PFIZER DIL RECON 30 MCG/0.3 ML IM: CPT

## 2021-05-11 ENCOUNTER — TELEPHONE (OUTPATIENT)
Dept: PEDIATRICS | Facility: CLINIC | Age: 51
End: 2021-05-11

## 2021-05-11 NOTE — TELEPHONE ENCOUNTER
Forms/Letter Request    Name of form/letter: insulin treated diabetes report    Have you been seen for this request: Yes / NA    Do we have the form/letter: Yes: in provider folder     When is form/letter needed by:  No later than Thursday 5/20/21    How would you like the form/letter returned:     Patient Notified form requests are processed in 3-5 business days:Yes    Okay to leave a detailed message? Yes Home number on file [phone] (home)

## 2021-05-14 ENCOUNTER — IMMUNIZATION (OUTPATIENT)
Dept: NURSING | Facility: CLINIC | Age: 51
End: 2021-05-14
Attending: INTERNAL MEDICINE
Payer: COMMERCIAL

## 2021-05-14 PROCEDURE — 0002A PR COVID VAC PFIZER DIL RECON 30 MCG/0.3 ML IM: CPT

## 2021-05-14 PROCEDURE — 91300 PR COVID VAC PFIZER DIL RECON 30 MCG/0.3 ML IM: CPT

## 2021-06-05 ENCOUNTER — HEALTH MAINTENANCE LETTER (OUTPATIENT)
Age: 51
End: 2021-06-05

## 2021-06-11 ENCOUNTER — MYC MEDICAL ADVICE (OUTPATIENT)
Dept: PEDIATRICS | Facility: CLINIC | Age: 51
End: 2021-06-11

## 2021-06-11 DIAGNOSIS — E11.65 TYPE 2 DIABETES MELLITUS WITH HYPERGLYCEMIA, UNSPECIFIED WHETHER LONG TERM INSULIN USE (H): Primary | ICD-10-CM

## 2021-06-18 ENCOUNTER — TRANSFERRED RECORDS (OUTPATIENT)
Dept: HEALTH INFORMATION MANAGEMENT | Facility: CLINIC | Age: 51
End: 2021-06-18

## 2021-06-18 LAB — RETINOPATHY: NEGATIVE

## 2021-07-29 NOTE — TELEPHONE ENCOUNTER
Called and LM to return call.  Geneva Pelaez LPN     decreased ROM due to pain details… detailed exam

## 2021-09-16 ENCOUNTER — TRANSFERRED RECORDS (OUTPATIENT)
Dept: HEALTH INFORMATION MANAGEMENT | Facility: CLINIC | Age: 51
End: 2021-09-16

## 2021-09-25 ENCOUNTER — HEALTH MAINTENANCE LETTER (OUTPATIENT)
Age: 51
End: 2021-09-25

## 2021-11-09 ENCOUNTER — MYC MEDICAL ADVICE (OUTPATIENT)
Dept: PEDIATRICS | Facility: CLINIC | Age: 51
End: 2021-11-09
Payer: COMMERCIAL

## 2021-11-09 DIAGNOSIS — Z12.31 ENCOUNTER FOR SCREENING MAMMOGRAM FOR BREAST CANCER: Primary | ICD-10-CM

## 2021-11-09 DIAGNOSIS — Z79.4 TYPE 2 DIABETES MELLITUS WITHOUT COMPLICATION, WITH LONG-TERM CURRENT USE OF INSULIN (H): ICD-10-CM

## 2021-11-09 DIAGNOSIS — E11.9 TYPE 2 DIABETES MELLITUS WITHOUT COMPLICATION, WITH LONG-TERM CURRENT USE OF INSULIN (H): ICD-10-CM

## 2021-11-09 NOTE — TELEPHONE ENCOUNTER
Pending Prescriptions:                       Disp   Refills    Continuous Blood Gluc Sensor (FREESTYLE L*2 each 11           Sig: Change every 14 days.      Also looking for mammogram referral.       ELINOR Calles at 3:07 PM on November 9, 2021  [facility]  [phone]       Pt called and notified. Ending encounter.    ELINOR Calles at 8:45 AM on November 16, 2021  [facility]  [phone]

## 2021-11-15 ENCOUNTER — MYC MEDICAL ADVICE (OUTPATIENT)
Dept: PEDIATRICS | Facility: CLINIC | Age: 51
End: 2021-11-15
Payer: COMMERCIAL

## 2021-11-15 RX ORDER — FLASH GLUCOSE SENSOR
KIT MISCELLANEOUS
Qty: 2 EACH | Refills: 11 | Status: SHIPPED | OUTPATIENT
Start: 2021-11-15 | End: 2022-08-01

## 2021-11-15 NOTE — TELEPHONE ENCOUNTER
Refill was pended last week.    Kassie Jarvis, EMT at 8:42 AM on November 15, 2021  [facility]  [phone]

## 2022-01-06 ENCOUNTER — HOSPITAL ENCOUNTER (OUTPATIENT)
Dept: MAMMOGRAPHY | Facility: CLINIC | Age: 52
Discharge: HOME | End: 2022-01-06
Attending: INTERNAL MEDICINE | Admitting: INTERNAL MEDICINE
Payer: COMMERCIAL

## 2022-01-06 DIAGNOSIS — Z12.31 ENCOUNTER FOR SCREENING MAMMOGRAM FOR BREAST CANCER: ICD-10-CM

## 2022-01-06 PROCEDURE — 77067 SCR MAMMO BI INCL CAD: CPT

## 2022-01-09 ENCOUNTER — HEALTH MAINTENANCE LETTER (OUTPATIENT)
Age: 52
End: 2022-01-09

## 2022-02-08 DIAGNOSIS — I10 ESSENTIAL HYPERTENSION, BENIGN: ICD-10-CM

## 2022-02-08 DIAGNOSIS — E11.65 TYPE 2 DIABETES MELLITUS WITH HYPERGLYCEMIA, WITH LONG-TERM CURRENT USE OF INSULIN (H): Primary | ICD-10-CM

## 2022-02-08 DIAGNOSIS — E78.5 HYPERLIPIDEMIA LDL GOAL <100: ICD-10-CM

## 2022-02-08 DIAGNOSIS — Z79.4 TYPE 2 DIABETES MELLITUS WITH HYPERGLYCEMIA, WITH LONG-TERM CURRENT USE OF INSULIN (H): Primary | ICD-10-CM

## 2022-02-08 DIAGNOSIS — F43.23 ADJUSTMENT DISORDER WITH MIXED ANXIETY AND DEPRESSED MOOD: ICD-10-CM

## 2022-02-08 NOTE — TELEPHONE ENCOUNTER
Patient has appt with provider 4/26/22 but needs these refilled before that date. Patient is also requesting Ozempic be refilled.

## 2022-02-10 NOTE — TELEPHONE ENCOUNTER
Routing refill request to provider for review/approval because:  Patient needs to be seen because it has been more than 1 year since last office visit.-last visit May 2020  See pt request for insulin refills, new for PCP, former endo pt  Pt now scheduled appointment in 2 months  Please confirm ongoing refills  Lab Results   Component Value Date    A1C 8.5 06/07/2019    A1C 9.5 03/06/2019    A1C 11.9 12/05/2018    A1C 11.4 07/25/2018    A1C 11.9 04/23/2018   Bessie Altman RN, BSN  [facility]

## 2022-02-16 RX ORDER — HYDROCHLOROTHIAZIDE 25 MG/1
25 TABLET ORAL EVERY MORNING
Qty: 90 TABLET | Refills: 0 | Status: SHIPPED | OUTPATIENT
Start: 2022-02-16 | End: 2022-08-01

## 2022-02-16 RX ORDER — LOSARTAN POTASSIUM 100 MG/1
100 TABLET ORAL DAILY
Qty: 90 TABLET | Refills: 0 | Status: SHIPPED | OUTPATIENT
Start: 2022-02-16 | End: 2022-08-01

## 2022-02-16 RX ORDER — ATORVASTATIN CALCIUM 10 MG/1
10 TABLET, FILM COATED ORAL DAILY
Qty: 90 TABLET | Refills: 0 | Status: SHIPPED | OUTPATIENT
Start: 2022-02-16 | End: 2022-08-01

## 2022-02-16 RX ORDER — METFORMIN HCL 500 MG
2000 TABLET, EXTENDED RELEASE 24 HR ORAL
Qty: 360 TABLET | Refills: 0 | Status: SHIPPED | OUTPATIENT
Start: 2022-02-16 | End: 2022-08-01

## 2022-02-16 RX ORDER — INSULIN ASPART 100 [IU]/ML
INJECTION, SOLUTION INTRAVENOUS; SUBCUTANEOUS
Qty: 30 ML | Refills: 0 | Status: SHIPPED | OUTPATIENT
Start: 2022-02-16 | End: 2022-08-01

## 2022-02-17 PROBLEM — E11.65 TYPE 2 DIABETES MELLITUS WITH HYPERGLYCEMIA (H): Status: ACTIVE | Noted: 2017-04-10

## 2022-08-01 ENCOUNTER — OFFICE VISIT (OUTPATIENT)
Dept: PEDIATRICS | Facility: CLINIC | Age: 52
End: 2022-08-01
Payer: COMMERCIAL

## 2022-08-01 ENCOUNTER — TRANSFERRED RECORDS (OUTPATIENT)
Dept: HEALTH INFORMATION MANAGEMENT | Facility: CLINIC | Age: 52
End: 2022-08-01

## 2022-08-01 VITALS
HEART RATE: 108 BPM | OXYGEN SATURATION: 97 % | HEIGHT: 64 IN | BODY MASS INDEX: 34.49 KG/M2 | SYSTOLIC BLOOD PRESSURE: 146 MMHG | DIASTOLIC BLOOD PRESSURE: 100 MMHG | TEMPERATURE: 98.2 F | RESPIRATION RATE: 20 BRPM | WEIGHT: 202 LBS

## 2022-08-01 DIAGNOSIS — Z11.59 NEED FOR HEPATITIS C SCREENING TEST: ICD-10-CM

## 2022-08-01 DIAGNOSIS — Z87.898 HISTORY OF WHEEZING: ICD-10-CM

## 2022-08-01 DIAGNOSIS — Z00.00 ROUTINE GENERAL MEDICAL EXAMINATION AT A HEALTH CARE FACILITY: Primary | ICD-10-CM

## 2022-08-01 DIAGNOSIS — F43.23 ADJUSTMENT DISORDER WITH MIXED ANXIETY AND DEPRESSED MOOD: ICD-10-CM

## 2022-08-01 DIAGNOSIS — E11.9 TYPE 2 DIABETES MELLITUS WITHOUT COMPLICATION, WITH LONG-TERM CURRENT USE OF INSULIN (H): ICD-10-CM

## 2022-08-01 DIAGNOSIS — E78.5 HYPERLIPIDEMIA LDL GOAL <100: ICD-10-CM

## 2022-08-01 DIAGNOSIS — Z23 NEED FOR COVID-19 VACCINE: ICD-10-CM

## 2022-08-01 DIAGNOSIS — G43.009 MIGRAINE WITHOUT AURA AND WITHOUT STATUS MIGRAINOSUS, NOT INTRACTABLE: ICD-10-CM

## 2022-08-01 DIAGNOSIS — I10 ESSENTIAL HYPERTENSION, BENIGN: ICD-10-CM

## 2022-08-01 DIAGNOSIS — Z79.4 TYPE 2 DIABETES MELLITUS WITHOUT COMPLICATION, WITH LONG-TERM CURRENT USE OF INSULIN (H): ICD-10-CM

## 2022-08-01 LAB
ANION GAP SERPL CALCULATED.3IONS-SCNC: 8 MMOL/L (ref 3–14)
BUN SERPL-MCNC: 18 MG/DL (ref 7–30)
CALCIUM SERPL-MCNC: 9.4 MG/DL (ref 8.5–10.1)
CHLORIDE BLD-SCNC: 100 MMOL/L (ref 94–109)
CHOLEST SERPL-MCNC: 189 MG/DL
CO2 SERPL-SCNC: 29 MMOL/L (ref 20–32)
CREAT SERPL-MCNC: 0.58 MG/DL (ref 0.52–1.04)
CREAT UR-MCNC: 400 MG/DL
FASTING STATUS PATIENT QL REPORTED: YES
GFR SERPL CREATININE-BSD FRML MDRD: >90 ML/MIN/1.73M2
GLUCOSE BLD-MCNC: 181 MG/DL (ref 70–99)
HBA1C MFR BLD: 12.8 % (ref 0–5.6)
HDLC SERPL-MCNC: 66 MG/DL
LDLC SERPL CALC-MCNC: 97 MG/DL
MICROALBUMIN UR-MCNC: 780 MG/L
MICROALBUMIN/CREAT UR: 195 MG/G CR (ref 0–25)
NONHDLC SERPL-MCNC: 123 MG/DL
POTASSIUM BLD-SCNC: 3.4 MMOL/L (ref 3.4–5.3)
SODIUM SERPL-SCNC: 137 MMOL/L (ref 133–144)
TRIGL SERPL-MCNC: 129 MG/DL

## 2022-08-01 PROCEDURE — 36415 COLL VENOUS BLD VENIPUNCTURE: CPT | Performed by: INTERNAL MEDICINE

## 2022-08-01 PROCEDURE — 99396 PREV VISIT EST AGE 40-64: CPT | Mod: 25 | Performed by: INTERNAL MEDICINE

## 2022-08-01 PROCEDURE — 99214 OFFICE O/P EST MOD 30 MIN: CPT | Mod: 25 | Performed by: INTERNAL MEDICINE

## 2022-08-01 PROCEDURE — 83036 HEMOGLOBIN GLYCOSYLATED A1C: CPT | Performed by: INTERNAL MEDICINE

## 2022-08-01 PROCEDURE — 82043 UR ALBUMIN QUANTITATIVE: CPT | Performed by: INTERNAL MEDICINE

## 2022-08-01 PROCEDURE — 86803 HEPATITIS C AB TEST: CPT | Performed by: INTERNAL MEDICINE

## 2022-08-01 PROCEDURE — 80061 LIPID PANEL: CPT | Performed by: INTERNAL MEDICINE

## 2022-08-01 PROCEDURE — 0054A COVID-19,PF,PFIZER (12+ YRS): CPT | Performed by: INTERNAL MEDICINE

## 2022-08-01 PROCEDURE — 90471 IMMUNIZATION ADMIN: CPT | Performed by: INTERNAL MEDICINE

## 2022-08-01 PROCEDURE — 91305 COVID-19,PF,PFIZER (12+ YRS): CPT | Performed by: INTERNAL MEDICINE

## 2022-08-01 PROCEDURE — 90677 PCV20 VACCINE IM: CPT | Performed by: INTERNAL MEDICINE

## 2022-08-01 PROCEDURE — 80048 BASIC METABOLIC PNL TOTAL CA: CPT | Performed by: INTERNAL MEDICINE

## 2022-08-01 RX ORDER — ATORVASTATIN CALCIUM 10 MG/1
10 TABLET, FILM COATED ORAL DAILY
Qty: 90 TABLET | Refills: 0 | Status: SHIPPED | OUTPATIENT
Start: 2022-08-01 | End: 2022-11-02

## 2022-08-01 RX ORDER — FLASH GLUCOSE SENSOR
KIT MISCELLANEOUS
Qty: 2 EACH | Refills: 11 | Status: SHIPPED | OUTPATIENT
Start: 2022-08-01 | End: 2023-08-14

## 2022-08-01 RX ORDER — SEMAGLUTIDE 2.68 MG/ML
2 INJECTION, SOLUTION SUBCUTANEOUS WEEKLY
Qty: 9 ML | Refills: 3 | Status: SHIPPED | OUTPATIENT
Start: 2022-08-01 | End: 2023-02-01

## 2022-08-01 RX ORDER — ALBUTEROL SULFATE 90 UG/1
1-2 AEROSOL, METERED RESPIRATORY (INHALATION) EVERY 4 HOURS PRN
Qty: 8 G | Refills: 1 | Status: SHIPPED | OUTPATIENT
Start: 2022-08-01 | End: 2022-11-02

## 2022-08-01 RX ORDER — RIZATRIPTAN BENZOATE 5 MG/1
5-10 TABLET ORAL
Qty: 9 TABLET | Refills: 3 | Status: SHIPPED | OUTPATIENT
Start: 2022-08-01 | End: 2022-11-02

## 2022-08-01 RX ORDER — FLASH GLUCOSE SCANNING READER
1 EACH MISCELLANEOUS CONTINUOUS
Qty: 6 EACH | Refills: 3 | Status: SHIPPED | OUTPATIENT
Start: 2022-08-01

## 2022-08-01 RX ORDER — INSULIN ASPART 100 [IU]/ML
INJECTION, SOLUTION INTRAVENOUS; SUBCUTANEOUS
Qty: 30 ML | Refills: 0 | Status: SHIPPED | OUTPATIENT
Start: 2022-08-01 | End: 2022-11-02

## 2022-08-01 RX ORDER — HYDROCHLOROTHIAZIDE 25 MG/1
25 TABLET ORAL EVERY MORNING
Qty: 90 TABLET | Refills: 0 | Status: SHIPPED | OUTPATIENT
Start: 2022-08-01 | End: 2023-02-01

## 2022-08-01 RX ORDER — LOSARTAN POTASSIUM 100 MG/1
100 TABLET ORAL DAILY
Qty: 90 TABLET | Refills: 0 | Status: SHIPPED | OUTPATIENT
Start: 2022-08-01 | End: 2023-02-01

## 2022-08-01 RX ORDER — METFORMIN HCL 500 MG
2000 TABLET, EXTENDED RELEASE 24 HR ORAL
Qty: 360 TABLET | Refills: 0 | Status: SHIPPED | OUTPATIENT
Start: 2022-08-01 | End: 2022-11-02

## 2022-08-01 SDOH — HEALTH STABILITY: PHYSICAL HEALTH: ON AVERAGE, HOW MANY MINUTES DO YOU ENGAGE IN EXERCISE AT THIS LEVEL?: 20 MIN

## 2022-08-01 SDOH — ECONOMIC STABILITY: FOOD INSECURITY: WITHIN THE PAST 12 MONTHS, THE FOOD YOU BOUGHT JUST DIDN'T LAST AND YOU DIDN'T HAVE MONEY TO GET MORE.: NEVER TRUE

## 2022-08-01 SDOH — ECONOMIC STABILITY: INCOME INSECURITY: HOW HARD IS IT FOR YOU TO PAY FOR THE VERY BASICS LIKE FOOD, HOUSING, MEDICAL CARE, AND HEATING?: SOMEWHAT HARD

## 2022-08-01 SDOH — ECONOMIC STABILITY: TRANSPORTATION INSECURITY
IN THE PAST 12 MONTHS, HAS THE LACK OF TRANSPORTATION KEPT YOU FROM MEDICAL APPOINTMENTS OR FROM GETTING MEDICATIONS?: NO

## 2022-08-01 SDOH — ECONOMIC STABILITY: FOOD INSECURITY: WITHIN THE PAST 12 MONTHS, YOU WORRIED THAT YOUR FOOD WOULD RUN OUT BEFORE YOU GOT MONEY TO BUY MORE.: NEVER TRUE

## 2022-08-01 SDOH — HEALTH STABILITY: PHYSICAL HEALTH: ON AVERAGE, HOW MANY DAYS PER WEEK DO YOU ENGAGE IN MODERATE TO STRENUOUS EXERCISE (LIKE A BRISK WALK)?: 1 DAY

## 2022-08-01 SDOH — ECONOMIC STABILITY: TRANSPORTATION INSECURITY
IN THE PAST 12 MONTHS, HAS LACK OF TRANSPORTATION KEPT YOU FROM MEETINGS, WORK, OR FROM GETTING THINGS NEEDED FOR DAILY LIVING?: NO

## 2022-08-01 SDOH — ECONOMIC STABILITY: INCOME INSECURITY: IN THE LAST 12 MONTHS, WAS THERE A TIME WHEN YOU WERE NOT ABLE TO PAY THE MORTGAGE OR RENT ON TIME?: NO

## 2022-08-01 ASSESSMENT — ENCOUNTER SYMPTOMS
HEMATURIA: 0
FEVER: 0
CONSTIPATION: 0
NERVOUS/ANXIOUS: 0
PARESTHESIAS: 0
WEAKNESS: 0
ABDOMINAL PAIN: 0
DYSURIA: 0
MYALGIAS: 1
FREQUENCY: 0
HEARTBURN: 0
COUGH: 0
PALPITATIONS: 0
SHORTNESS OF BREATH: 0
NAUSEA: 0
ARTHRALGIAS: 1
DIARRHEA: 0
CHILLS: 0
SORE THROAT: 0
JOINT SWELLING: 1
HEMATOCHEZIA: 0
EYE PAIN: 0
HEADACHES: 1
DIZZINESS: 0
BREAST MASS: 0

## 2022-08-01 ASSESSMENT — ASTHMA QUESTIONNAIRES: ACT_TOTALSCORE: 24

## 2022-08-01 ASSESSMENT — SOCIAL DETERMINANTS OF HEALTH (SDOH)
HOW OFTEN DO YOU GET TOGETHER WITH FRIENDS OR RELATIVES?: TWICE A WEEK
DO YOU BELONG TO ANY CLUBS OR ORGANIZATIONS SUCH AS CHURCH GROUPS UNIONS, FRATERNAL OR ATHLETIC GROUPS, OR SCHOOL GROUPS?: NO
ARE YOU MARRIED, WIDOWED, DIVORCED, SEPARATED, NEVER MARRIED, OR LIVING WITH A PARTNER?: NEVER MARRIED
IN A TYPICAL WEEK, HOW MANY TIMES DO YOU TALK ON THE PHONE WITH FAMILY, FRIENDS, OR NEIGHBORS?: TWICE A WEEK
HOW OFTEN DO YOU ATTEND CHURCH OR RELIGIOUS SERVICES?: NEVER

## 2022-08-01 ASSESSMENT — LIFESTYLE VARIABLES
HOW OFTEN DO YOU HAVE SIX OR MORE DRINKS ON ONE OCCASION: NEVER
AUDIT-C TOTAL SCORE: 2
SKIP TO QUESTIONS 9-10: 1
HOW MANY STANDARD DRINKS CONTAINING ALCOHOL DO YOU HAVE ON A TYPICAL DAY: PATIENT DOES NOT DRINK
HOW OFTEN DO YOU HAVE A DRINK CONTAINING ALCOHOL: 2-4 TIMES A MONTH

## 2022-08-01 ASSESSMENT — PAIN SCALES - GENERAL: PAINLEVEL: NO PAIN (0)

## 2022-08-01 NOTE — PROGRESS NOTES
SUBJECTIVE:   CC: Lala George is an 52 year old woman who presents for preventive health visit.       Patient has been advised of split billing requirements and indicates understanding: Yes  Healthy Habits:     Getting at least 3 servings of Calcium per day:  NO    Bi-annual eye exam:  Yes    Dental care twice a year:  Yes    Sleep apnea or symptoms of sleep apnea:  Sleep apnea    Diet:  Regular (no restrictions)    Frequency of exercise:  1 day/week    Duration of exercise:  15-30 minutes    Taking medications regularly:  Yes    Barriers to taking medications:  None    Medication side effects:  None    PHQ-2 Total Score: 0    Additional concerns today:  No      Headaches     Bad headaches quite unusual for her. Has a long history of migraine, photophobia, vomiting. Has to just lay still. Current headaches start at base of skull. Just in the last year.  Before last year headaches more frontal. No nausea or photophobia with those headaches. Takes extra strength excedrin. Had a headache yesterday and couldn't get out of bed. Has a headache like this every couple months. Last all day, goes away when she takes OTC medication. Had a bad headache yesterday. Took extra strength 3 times during the day. By 6 PM headache started easing up.      Duration: all day    Description  Location: bilateral in the occipital area sometimes radiating to forehead  Character: throbbing pain  Frequency:  Every 3 months  Duration:  All day    Intensity:  severe    Accompanying signs and symptoms:    Precipitating or Alleviating factors:  Nausea/vomiting: sometimes  Dizziness: no  Weakness or numbness: no  Visual changes: none  Fever: no   Sinus or URI symptoms no     History  Head trauma: no  Previous tests for headaches: no  Neurologist evaluations: no  Able to do daily activities when headache present: no  Wake with headaches: no  Daily pain medication use: no  Any changes in: recent move, happy about this    Precipitating or  Alleviating factors (light/sound/sleep/caffeine): worse with light    Therapies tried and outcome: unsure what OTC extra strength medication she is taking    Outcome - needs to take 3 doses and goes away later in the day  Frequent/daily pain medication use: no    Unsure of home blood pressures.    Weight continues to come down.     Today's PHQ-2 Score: 0  PHQ-2 ( 1999 Pf[employer]) 8/1/2022   Q1: Little interest or pleasure in doing things 0   Q2: Feeling down, depressed or hopeless 0   PHQ-2 Score 0   PHQ-2 Total Score (12-17 Years)- Positive if 3 or more points; Administer PHQ-A if positive -   Q1: Little interest or pleasure in doing things Not at all   Q2: Feeling down, depressed or hopeless Not at all   PHQ-2 Score 0       Abuse: Current or Past (Physical, Sexual or Emotional) - No  Do you feel safe in your environment? Yes    Have you ever done Advance Care Planning? (For example, a Health Directive, POLST, or a discussion with a medical provider or your loved ones about your wishes): No, advance care planning information given to patient to review.  Advanced care planning was discussed at today's visit.    Social History     Tobacco Use     Smoking status: Never Smoker     Smokeless tobacco: Never Used   Substance Use Topics     Alcohol use: Yes     Comment: rare--one to two per week     If you drink alcohol do you typically have >3 drinks per day or >7 drinks per week? No    Alcohol Use 8/1/2022   Prescreen: >3 drinks/day or >7 drinks/week? No   Prescreen: >3 drinks/day or >7 drinks/week? -   No flowsheet data found.    Reviewed orders with patient.  Reviewed health maintenance and updated orders accordingly - Yes  Labs reviewed in EPIC    Breast Cancer Screening:  Any new diagnosis of family breast, ovarian, or bowel cancer? No    FHS-7:   Breast CA Risk Assessment (FHS-7) 1/6/2022 1/6/2022 8/1/2022   Did any of your first-degree relatives have breast or ovarian cancer? Yes - Yes   Did any of your relatives have  bilateral breast cancer? No Unknown Yes   Did any man in your family have breast cancer? No - No   Did any woman in your family have breast and ovarian cancer? No - Yes   Did any woman in your family have breast cancer before age 50 y? Yes - Yes   Do you have 2 or more relatives with breast and/or ovarian cancer? No - No   Do you have 2 or more relatives with breast and/or bowel cancer? No - No       Mammogram Screening: Recommended annual mammography  Pertinent mammograms are reviewed under the imaging tab.    History of abnormal Pap smear: NO - age 30-65 PAP every 5 years with negative HPV co-testing recommended  PAP / HPV Latest Ref Rng & Units 2/21/2018 7/14/2014 6/22/2011   PAP (Historical) - NIL NIL NIL   HPV16 NEG:Negative Negative - -   HPV18 NEG:Negative Negative - -   HRHPV NEG:Negative Negative - -     Reviewed and updated as needed this visit by clinical staff   Tobacco  Allergies  Meds   Med Hx  Surg Hx  Fam Hx  Soc Hx          Reviewed and updated as needed this visit by Provider                       Review of Systems   Constitutional: Negative for chills and fever.   HENT: Negative for congestion, ear pain, hearing loss and sore throat.    Eyes: Negative for pain and visual disturbance.   Respiratory: Negative for cough and shortness of breath.    Cardiovascular: Negative for chest pain, palpitations and peripheral edema.   Gastrointestinal: Negative for abdominal pain, constipation, diarrhea, heartburn, hematochezia and nausea.   Breasts:  Negative for tenderness, breast mass and discharge.   Genitourinary: Negative for dysuria, frequency, genital sores, hematuria, pelvic pain, urgency, vaginal bleeding and vaginal discharge.   Musculoskeletal: Positive for arthralgias, joint swelling and myalgias.   Skin: Negative for rash.   Neurological: Positive for headaches. Negative for dizziness, weakness and paresthesias.   Psychiatric/Behavioral: Negative for mood changes. The patient is not  "nervous/anxious.         OBJECTIVE:   BP (!) 142/100   Pulse 108   Temp 98.2  F (36.8  C) (Oral)   Resp 20   Ht 1.63 m (5' 4.17\")   Wt 91.6 kg (202 lb)   LMP 01/18/2013   SpO2 97%   BMI 34.49 kg/m    Physical Exam  GENERAL: healthy, alert and no distress  EYES: Eyes grossly normal to inspection, PERRL and conjunctivae and sclerae normal  NECK: no adenopathy, no asymmetry, masses, or scars and thyroid normal to palpation  RESP: lungs clear to auscultation - no rales, rhonchi or wheezes  CV: regular rate and rhythm, normal S1 S2, no S3 or S4, no murmur, click or rub, no peripheral edema and peripheral pulses strong  ABDOMEN: soft, nontender, no hepatosplenomegaly, no masses and bowel sounds normal  MS: no gross musculoskeletal defects noted, no edema  SKIN: typical violaceous patches with silvery scale bilateral LE and forearms  NEURO: Normal strength and tone, mentation intact and speech normal  PSYCH: mentation appears normal, affect normal/bright  Diabetic foot exam: normal DP and PT pulses, no trophic changes or ulcerative lesions and normal monofilament exam    Diagnostic Test Results:  Labs reviewed in Epic    ASSESSMENT/PLAN:     1. Routine general medical examination at a health care facility      2. Type 2 diabetes mellitus without complication, with long-term current use of insulin (H)  Await lab. She thinks her weight is still coming down. Sugars only go low when she takes her premeal insulin and forgets to eat. Will try increasing ozempic, will likely need to taper down on her pre meal insulin. Have recommended she follow up with diab ed for assistance on this.  - Continuous Blood Gluc Sensor (FREESTYLE TE[place] 14 DAY SENSOR[place] [facility] [place] ONE EVERY 14 DAYS  Dispense: 2 each; Refill: 11    3. Migraine without aura and without status migrainosus, not intractable  Previous headaches mostly frontal. In the last year have been more severe and starting in occipital region. Not positional. Not increasing in " "the last year, rare episodes. No vision change or symptoms. Photophobia similar to previous headaches, location and severity are different. Discussed trial on migraine medication with next episode vs imaging. If any change or increase, she will let me know and we would consider MRI evaluation and neuro referral.    4. Essential hypertension, benign  Elevated today, but didn't take her medication in preparation for fasting lab. She will watch at home and recheck at nurse visit  - hydrochlorothiazide (HYDRODIURIL) 25 MG tablet; Take 1 tablet (25 mg) by mouth every morning  Dispense: 90 tablet; Refill: 0  - losartan (COZAAR) 100 MG tablet; Take 1 tablet (100 mg) by mouth daily  Dispense: 90 tablet; Refill: 0    5. Hyperlipidemia LDL goal <100    - atorvastatin (LIPITOR) 10 MG tablet; Take 1 tablet (10 mg) by mouth daily  Dispense: 90 tablet; Refill: 0    6. Adjustment disorder with mixed anxiety and depressed mood  stable  - sertraline (ZOLOFT) 50 MG tablet; Take 1 tablet (50 mg) by mouth daily  Dispense: 90 tablet; Refill: 0    7. History of wheezing    - albuterol (VENTOLIN HFA) 108 (90 Base) MCG/ACT inhaler; Inhale 1-2 puffs into the lungs every 4 hours as needed for wheezing  Dispense: 8 g; Refill: 1    8. Need for COVID-19 vaccine    - COVID-19,PF,PFIZER (12+ Yrs GRAY LABEL)    9. Need for hepatitis C screening test    - Hepatitis C Screen Reflex to HCV RNA Quant and Genotype; Future  - Hepatitis C Screen Reflex to HCV RNA Quant and Genotype    COUNSELING:  Reviewed preventive health counseling, as reflected in patient instructions    Estimated body mass index is 34.49 kg/m  as calculated from the following:    Height as of this encounter: 1.63 m (5' 4.17\").    Weight as of this encounter: 91.6 kg (202 lb).    Weight management plan: as above    She reports that she has never smoked. She has never used smokeless tobacco.      Counseling Resources:  ATP IV Guidelines  Pooled Cohorts Equation Calculator  Breast " Cancer Risk Calculator  BRCA-Related Cancer Risk Assessment: FHS-7 Tool  FRAX Risk Assessment  ICSI Preventive Guidelines  Dietary Guidelines for Americans, 2010  USDA's MyPlate  ASA Prophylaxis  Lung CA Screening    Heidy Jama MD  [facility]

## 2022-08-01 NOTE — PATIENT INSTRUCTIONS
Check your blood pressure at any  clinic or pharmacy or check your blood pressure 2-3 times a week at home. If above 130/80 on more than 3 occasions, we should consider adding a bedtime dose of amlodipine.     Increase ozempic.    Try maxalt with your next headache.  Take as soon as you feel headache coming on.    Preventive Health Recommendations  Female Ages 50 - 64    Yearly exam: See your health care provider every year in order to  Review health changes.   Discuss preventive care.    Review your medicines if your doctor has prescribed any.    Get a Pap test every three years (unless you have an abnormal result and your provider advises testing more often).  If you get Pap tests with HPV test, you only need to test every 5 years, unless you have an abnormal result.   You do not need a Pap test if your uterus was removed (hysterectomy) and you have not had cancer.  You should be tested each year for STDs (sexually transmitted diseases) if you're at risk.   Have a mammogram every 1 to 2 years.  Have a colonoscopy at age 50, or have a yearly FIT test (stool test). These exams screen for colon cancer.    Have a cholesterol test every 5 years, or more often if advised.  Have a diabetes test (fasting glucose) every three years. If you are at risk for diabetes, you should have this test more often.   If you are at risk for osteoporosis (brittle bone disease), think about having a bone density scan (DEXA).    Shots: Get a flu shot each year. Get a tetanus shot every 10 years.    Nutrition:   Eat at least 5 servings of fruits and vegetables each day.  Eat whole-grain bread, whole-wheat pasta and brown rice instead of white grains and rice.  Get adequate Calcium and Vitamin D.     Lifestyle  Exercise at least 150 minutes a week (30 minutes a day, 5 days a week). This will help you control your weight and prevent disease.  Limit alcohol to one drink per day.  No smoking.   Wear sunscreen to prevent skin cancer.   See  your dentist every six months for an exam and cleaning.  See your eye doctor every 1 to 2 years.

## 2022-08-02 ENCOUNTER — TELEPHONE (OUTPATIENT)
Dept: PEDIATRICS | Facility: CLINIC | Age: 52
End: 2022-08-02

## 2022-08-02 DIAGNOSIS — E11.65 TYPE 2 DIABETES MELLITUS WITH HYPERGLYCEMIA (H): ICD-10-CM

## 2022-08-02 LAB — HCV AB SERPL QL IA: NONREACTIVE

## 2022-08-02 NOTE — TELEPHONE ENCOUNTER
Diabetes Education Scheduling Outreach #1:    Call to patient to schedule. Call cannot be completed as dialed.    Also sent Notice Kiosk message for second attempt. Requested patient to call to schedule.    Alejandrina Temple OnCall  Diabetes and Nutrition Scheduling

## 2022-08-05 NOTE — TELEPHONE ENCOUNTER
Routing refill request to provider for review/approval because:  Issuing provider has retired    Nakita Espitia RN, BSN, PHN  [facility]

## 2022-08-08 RX ORDER — PEN NEEDLE, DIABETIC 30 GX 1/3"
NEEDLE, DISPOSABLE MISCELLANEOUS
Qty: 200 EACH | Refills: 11 | Status: SHIPPED | OUTPATIENT
Start: 2022-08-08 | End: 2023-11-06

## 2022-11-02 ENCOUNTER — OFFICE VISIT (OUTPATIENT)
Dept: PEDIATRICS | Facility: CLINIC | Age: 52
End: 2022-11-02
Payer: COMMERCIAL

## 2022-11-02 VITALS
TEMPERATURE: 98.4 F | HEART RATE: 98 BPM | RESPIRATION RATE: 16 BRPM | OXYGEN SATURATION: 98 % | WEIGHT: 204 LBS | BODY MASS INDEX: 34.83 KG/M2 | SYSTOLIC BLOOD PRESSURE: 120 MMHG | DIASTOLIC BLOOD PRESSURE: 78 MMHG

## 2022-11-02 DIAGNOSIS — E66.01 MORBID OBESITY (H): ICD-10-CM

## 2022-11-02 DIAGNOSIS — E78.5 HYPERLIPIDEMIA LDL GOAL <100: ICD-10-CM

## 2022-11-02 DIAGNOSIS — F43.23 ADJUSTMENT DISORDER WITH MIXED ANXIETY AND DEPRESSED MOOD: ICD-10-CM

## 2022-11-02 DIAGNOSIS — Z79.4 TYPE 2 DIABETES MELLITUS WITH HYPERGLYCEMIA, WITH LONG-TERM CURRENT USE OF INSULIN (H): Primary | ICD-10-CM

## 2022-11-02 DIAGNOSIS — Z12.31 VISIT FOR SCREENING MAMMOGRAM: ICD-10-CM

## 2022-11-02 DIAGNOSIS — Z87.898 HISTORY OF WHEEZING: ICD-10-CM

## 2022-11-02 DIAGNOSIS — E11.65 TYPE 2 DIABETES MELLITUS WITH HYPERGLYCEMIA, WITH LONG-TERM CURRENT USE OF INSULIN (H): Primary | ICD-10-CM

## 2022-11-02 DIAGNOSIS — G47.33 OSA (OBSTRUCTIVE SLEEP APNEA): ICD-10-CM

## 2022-11-02 DIAGNOSIS — I10 ESSENTIAL HYPERTENSION, BENIGN: ICD-10-CM

## 2022-11-02 DIAGNOSIS — G43.009 MIGRAINE WITHOUT AURA AND WITHOUT STATUS MIGRAINOSUS, NOT INTRACTABLE: ICD-10-CM

## 2022-11-02 LAB — HBA1C MFR BLD: 12.6 % (ref 0–5.6)

## 2022-11-02 PROCEDURE — 36415 COLL VENOUS BLD VENIPUNCTURE: CPT | Performed by: INTERNAL MEDICINE

## 2022-11-02 PROCEDURE — 0124A COVID-19,PF,PFIZER BOOSTER BIVALENT: CPT | Performed by: INTERNAL MEDICINE

## 2022-11-02 PROCEDURE — 83036 HEMOGLOBIN GLYCOSYLATED A1C: CPT | Performed by: INTERNAL MEDICINE

## 2022-11-02 PROCEDURE — 90471 IMMUNIZATION ADMIN: CPT | Performed by: INTERNAL MEDICINE

## 2022-11-02 PROCEDURE — 91312 COVID-19,PF,PFIZER BOOSTER BIVALENT: CPT | Performed by: INTERNAL MEDICINE

## 2022-11-02 PROCEDURE — 84132 ASSAY OF SERUM POTASSIUM: CPT | Performed by: INTERNAL MEDICINE

## 2022-11-02 PROCEDURE — 99214 OFFICE O/P EST MOD 30 MIN: CPT | Mod: 25 | Performed by: INTERNAL MEDICINE

## 2022-11-02 PROCEDURE — 90682 RIV4 VACC RECOMBINANT DNA IM: CPT | Performed by: INTERNAL MEDICINE

## 2022-11-02 RX ORDER — INSULIN ASPART 100 [IU]/ML
INJECTION, SOLUTION INTRAVENOUS; SUBCUTANEOUS
Qty: 30 ML | Refills: 11 | Status: SHIPPED | OUTPATIENT
Start: 2022-11-02 | End: 2023-11-06

## 2022-11-02 RX ORDER — RIZATRIPTAN BENZOATE 5 MG/1
5-10 TABLET ORAL
Qty: 9 TABLET | Refills: 3 | Status: SHIPPED | OUTPATIENT
Start: 2022-11-02 | End: 2023-11-06

## 2022-11-02 RX ORDER — ATORVASTATIN CALCIUM 10 MG/1
10 TABLET, FILM COATED ORAL DAILY
Qty: 90 TABLET | Refills: 3 | Status: SHIPPED | OUTPATIENT
Start: 2022-11-02 | End: 2023-11-06

## 2022-11-02 RX ORDER — ALBUTEROL SULFATE 90 UG/1
1-2 AEROSOL, METERED RESPIRATORY (INHALATION) EVERY 4 HOURS PRN
Qty: 8 G | Refills: 1 | Status: SHIPPED | OUTPATIENT
Start: 2022-11-02 | End: 2023-11-06

## 2022-11-02 RX ORDER — ATORVASTATIN CALCIUM 10 MG/1
10 TABLET, FILM COATED ORAL DAILY
Qty: 90 TABLET | Refills: 3 | Status: SHIPPED | OUTPATIENT
Start: 2022-11-02 | End: 2022-11-02

## 2022-11-02 RX ORDER — METFORMIN HCL 500 MG
2000 TABLET, EXTENDED RELEASE 24 HR ORAL
Qty: 360 TABLET | Refills: 4 | Status: SHIPPED | OUTPATIENT
Start: 2022-11-02 | End: 2022-11-02

## 2022-11-02 RX ORDER — RIZATRIPTAN BENZOATE 5 MG/1
5-10 TABLET ORAL
Qty: 9 TABLET | Refills: 3 | Status: SHIPPED | OUTPATIENT
Start: 2022-11-02 | End: 2022-11-02

## 2022-11-02 RX ORDER — LOSARTAN POTASSIUM AND HYDROCHLOROTHIAZIDE 25; 100 MG/1; MG/1
1 TABLET ORAL DAILY
Qty: 90 TABLET | Refills: 3 | Status: SHIPPED | OUTPATIENT
Start: 2022-11-02 | End: 2023-11-06

## 2022-11-02 RX ORDER — METFORMIN HCL 500 MG
2000 TABLET, EXTENDED RELEASE 24 HR ORAL
Qty: 360 TABLET | Refills: 4 | Status: SHIPPED | OUTPATIENT
Start: 2022-11-02 | End: 2023-11-06

## 2022-11-02 RX ORDER — LOSARTAN POTASSIUM AND HYDROCHLOROTHIAZIDE 25; 100 MG/1; MG/1
1 TABLET ORAL DAILY
Qty: 90 TABLET | Refills: 3 | Status: SHIPPED | OUTPATIENT
Start: 2022-11-02 | End: 2022-11-02

## 2022-11-02 RX ORDER — INSULIN ASPART 100 [IU]/ML
INJECTION, SOLUTION INTRAVENOUS; SUBCUTANEOUS
Qty: 30 ML | Refills: 11 | Status: SHIPPED | OUTPATIENT
Start: 2022-11-02 | End: 2022-11-02

## 2022-11-02 ASSESSMENT — PAIN SCALES - GENERAL: PAINLEVEL: NO PAIN (0)

## 2022-11-02 NOTE — PATIENT INSTRUCTIONS
Stop losartan and hydrochlorothiazide and start Hyzaar, 1 pill with those two medications together.     New CPAP supplies.

## 2022-11-02 NOTE — PROGRESS NOTES
Assessment & Plan     Type 2 diabetes mellitus with hyperglycemia, with long-term current use of insulin (H)  Last A1C elevated. Didn't end up seeing diabetes education. Strongly encouraged this now. A1C checked today. If still significantly elevated, would ask diab ed to assist with medication adjustment/insulin dosing.  - Hemoglobin A1c; Future  - Hemoglobin A1c; Future  - AMB Adult Diabetes Educator Referral; Future  - Hemoglobin A1c  - metFORMIN (GLUCOPHAGE XR) 500 MG 24 hr tablet; Take 4 tablets (2,000 mg) by mouth daily (with dinner) DUE FOR ENDO FOLLOW UP - PLEASE SCHEDULE.  - insulin degludec (TRESIBA) 200 UNIT/ML pen; 70 units subcutaneous daily, max dose  - insulin aspart (NOVOLOG FLEXPEN) 100 UNIT/ML pen; 25 units before each meal + 1 unit per 25 points of blood sugar above 150. Total daily dose 80 units per day    Morbid Obesity  Has been working with weight management. Currently on glp-1 at 2 mg. Weight not significantly improving. For now, will continue at this dose, consider whether LUCIO CPAP issues may be contributing. If weight doesn't come down, could consider increasing to max dose semaglutide. Briefly discussed surgery as an option in future.     Hyperlipidemia LDL goal <100  tolerating  - atorvastatin (LIPITOR) 10 MG tablet; Take 1 tablet (10 mg) by mouth daily    Essential hypertension, benign  Excellent control. Combining 2 meds into hyzaar tabs.  - Potassium; Future  - Potassium  - losartan-hydrochlorothiazide (HYZAAR) 100-25 MG tablet; Take 1 tablet by mouth daily    Adjustment disorder with mixed anxiety and depressed mood  stable  - sertraline (ZOLOFT) 50 MG tablet; Take 1 tablet (50 mg) by mouth daily    Migraine without aura and without status migrainosus, not intractable  refilled  - rizatriptan (MAXALT) 5 MG tablet; Take 1-2 tablets (5-10 mg) by mouth at onset of headache for migraine May repeat in 2 hours. Max 6 tablets/24 hours.    LUCIO (obstructive sleep apnea)  Currently having  issues with supplies and fit.   - CPAP Order for DME - ONLY FOR DME    History of wheezing  refilled  - albuterol (VENTOLIN HFA) 108 (90 Base) MCG/ACT inhaler; Inhale 1-2 puffs into the lungs every 4 hours as needed for wheezing    Visit for screening mammogram    - MA Screen Bilateral w/Danyel; Future    38 minutes spent on the date of the encounter doing chart review, history and exam, documentation and further activities per the note       See Patient Instructions    No follow-ups on file.    Heidy Jama MD  [facility] ZAYNAB Reeves is a 52 year old, presenting for the following health issues:  Recheck Medication      History of Present Illness       Reason for visit:  Follow up    She eats 0-1 servings of fruits and vegetables daily.She consumes 1 sweetened beverage(s) daily.She exercises with enough effort to increase her heart rate 9 or less minutes per day.  She exercises with enough effort to increase her heart rate 3 or less days per week. She is missing 2 dose(s) of medications per week.       Diabetes Follow-up      How often are you checking your blood sugar? Two times daily    Blood sugar testing frequency justification:  Adjustment of medication(s)    What time of day are you checking your blood sugars (select all that apply)?  Before meals    Have you had any blood sugars above 200?  Yes     Have you had any blood sugars below 70?  No    What symptoms do you notice when your blood sugar is low?  Shaky, Dizzy and Weak    What concerns do you have today about your diabetes? None     Do you have any of these symptoms? (Select all that apply)  Excessive thirst    Have you had a diabetic eye exam in the last 12 months? No                Hyperlipidemia Follow-Up      Are you regularly taking any medication or supplement to lower your cholesterol?   Yes- lipitor    Are you having muscle aches or other side effects that you think could be caused by your cholesterol lowering  medication?  No    Hypertension Follow-up      Do you check your blood pressure regularly outside of the clinic? No     Are you following a low salt diet? Yes    Are your blood pressures ever more than 140 on the top number (systolic) OR more   than 90 on the bottom number (diastolic), for example 140/90? Yes    BP Readings from Last 2 Encounters:   11/02/22 120/78   08/01/22 (!) 146/100     Hemoglobin A1C (%)   Date Value   08/01/2022 12.8 (H)   06/07/2019 8.5 (H)   03/06/2019 9.5 (H)     LDL Cholesterol Calculated (mg/dL)   Date Value   08/01/2022 97   03/16/2019 54   02/21/2018 84           Review of Systems         Objective    /78 (BP Location: Right arm, Patient Position: Sitting, Cuff Size: Adult Large)   Pulse 98   Temp 98.4  F (36.9  C) (Tympanic)   Resp 16   Wt 92.5 kg (204 lb)   LMP 01/18/2013   SpO2 98%   BMI 34.83 kg/m    Body mass index is 34.83 kg/m .     Physical Exam       none

## 2022-11-03 LAB — POTASSIUM BLD-SCNC: 3.3 MMOL/L (ref 3.4–5.3)

## 2022-12-01 LAB — RETINOPATHY: NEGATIVE

## 2022-12-30 DIAGNOSIS — Z79.4 TYPE 2 DIABETES MELLITUS WITH HYPERGLYCEMIA, WITH LONG-TERM CURRENT USE OF INSULIN (H): Primary | ICD-10-CM

## 2022-12-30 DIAGNOSIS — E11.65 TYPE 2 DIABETES MELLITUS WITH HYPERGLYCEMIA, WITH LONG-TERM CURRENT USE OF INSULIN (H): Primary | ICD-10-CM

## 2023-01-12 ENCOUNTER — ANCILLARY PROCEDURE (OUTPATIENT)
Dept: MAMMOGRAPHY | Facility: CLINIC | Age: 53
End: 2023-01-12
Attending: INTERNAL MEDICINE
Payer: COMMERCIAL

## 2023-01-12 DIAGNOSIS — Z12.31 VISIT FOR SCREENING MAMMOGRAM: ICD-10-CM

## 2023-01-12 PROCEDURE — 77067 SCR MAMMO BI INCL CAD: CPT | Mod: TC | Performed by: RADIOLOGY

## 2023-01-12 PROCEDURE — 77063 BREAST TOMOSYNTHESIS BI: CPT | Mod: TC | Performed by: RADIOLOGY

## 2023-02-01 ENCOUNTER — OFFICE VISIT (OUTPATIENT)
Dept: PEDIATRICS | Facility: CLINIC | Age: 53
End: 2023-02-01
Payer: COMMERCIAL

## 2023-02-01 ENCOUNTER — TELEPHONE (OUTPATIENT)
Dept: PEDIATRICS | Facility: CLINIC | Age: 53
End: 2023-02-01

## 2023-02-01 VITALS
HEART RATE: 103 BPM | HEIGHT: 64 IN | DIASTOLIC BLOOD PRESSURE: 100 MMHG | SYSTOLIC BLOOD PRESSURE: 162 MMHG | OXYGEN SATURATION: 98 % | TEMPERATURE: 98.8 F | BODY MASS INDEX: 34.83 KG/M2 | RESPIRATION RATE: 16 BRPM | WEIGHT: 204 LBS

## 2023-02-01 DIAGNOSIS — Z79.4 TYPE 2 DIABETES MELLITUS WITH HYPERGLYCEMIA, WITH LONG-TERM CURRENT USE OF INSULIN (H): Primary | ICD-10-CM

## 2023-02-01 DIAGNOSIS — I10 ESSENTIAL HYPERTENSION: ICD-10-CM

## 2023-02-01 DIAGNOSIS — E11.65 TYPE 2 DIABETES MELLITUS WITH HYPERGLYCEMIA, WITH LONG-TERM CURRENT USE OF INSULIN (H): Primary | ICD-10-CM

## 2023-02-01 DIAGNOSIS — E66.01 MORBID OBESITY (H): ICD-10-CM

## 2023-02-01 DIAGNOSIS — G47.33 OSA (OBSTRUCTIVE SLEEP APNEA): ICD-10-CM

## 2023-02-01 LAB
ANION GAP SERPL CALCULATED.3IONS-SCNC: 13 MMOL/L (ref 7–15)
BUN SERPL-MCNC: 14.1 MG/DL (ref 6–20)
CALCIUM SERPL-MCNC: 10.5 MG/DL (ref 8.6–10)
CHLORIDE SERPL-SCNC: 100 MMOL/L (ref 98–107)
CREAT SERPL-MCNC: 0.55 MG/DL (ref 0.51–0.95)
CREAT UR-MCNC: 111 MG/DL
DEPRECATED HCO3 PLAS-SCNC: 26 MMOL/L (ref 22–29)
GFR SERPL CREATININE-BSD FRML MDRD: >90 ML/MIN/1.73M2
GLUCOSE SERPL-MCNC: 171 MG/DL (ref 70–99)
HBA1C MFR BLD: 12.7 % (ref 0–5.6)
MICROALBUMIN UR-MCNC: 104 MG/L
MICROALBUMIN/CREAT UR: 93.69 MG/G CR (ref 0–25)
POTASSIUM SERPL-SCNC: 3.9 MMOL/L (ref 3.4–5.3)
SODIUM SERPL-SCNC: 139 MMOL/L (ref 136–145)

## 2023-02-01 PROCEDURE — 99214 OFFICE O/P EST MOD 30 MIN: CPT | Performed by: INTERNAL MEDICINE

## 2023-02-01 PROCEDURE — 36415 COLL VENOUS BLD VENIPUNCTURE: CPT | Performed by: INTERNAL MEDICINE

## 2023-02-01 PROCEDURE — 82043 UR ALBUMIN QUANTITATIVE: CPT | Performed by: INTERNAL MEDICINE

## 2023-02-01 PROCEDURE — 80048 BASIC METABOLIC PNL TOTAL CA: CPT | Performed by: INTERNAL MEDICINE

## 2023-02-01 PROCEDURE — 83036 HEMOGLOBIN GLYCOSYLATED A1C: CPT | Performed by: INTERNAL MEDICINE

## 2023-02-01 PROCEDURE — 82570 ASSAY OF URINE CREATININE: CPT | Performed by: INTERNAL MEDICINE

## 2023-02-01 RX ORDER — SEMAGLUTIDE 2.68 MG/ML
2 INJECTION, SOLUTION SUBCUTANEOUS WEEKLY
Qty: 9 ML | Refills: 3 | Status: SHIPPED | OUTPATIENT
Start: 2023-02-01 | End: 2023-11-06

## 2023-02-01 ASSESSMENT — ASTHMA QUESTIONNAIRES
ACT_TOTALSCORE: 24
QUESTION_2 LAST FOUR WEEKS HOW OFTEN HAVE YOU HAD SHORTNESS OF BREATH: NOT AT ALL
QUESTION_1 LAST FOUR WEEKS HOW MUCH OF THE TIME DID YOUR ASTHMA KEEP YOU FROM GETTING AS MUCH DONE AT WORK, SCHOOL OR AT HOME: NONE OF THE TIME
QUESTION_4 LAST FOUR WEEKS HOW OFTEN HAVE YOU USED YOUR RESCUE INHALER OR NEBULIZER MEDICATION (SUCH AS ALBUTEROL): NOT AT ALL
ACT_TOTALSCORE: 24
QUESTION_3 LAST FOUR WEEKS HOW OFTEN DID YOUR ASTHMA SYMPTOMS (WHEEZING, COUGHING, SHORTNESS OF BREATH, CHEST TIGHTNESS OR PAIN) WAKE YOU UP AT NIGHT OR EARLIER THAN USUAL IN THE MORNING: NOT AT ALL
QUESTION_5 LAST FOUR WEEKS HOW WOULD YOU RATE YOUR ASTHMA CONTROL: WELL CONTROLLED

## 2023-02-01 ASSESSMENT — PAIN SCALES - GENERAL: PAINLEVEL: NO PAIN (0)

## 2023-02-01 NOTE — Clinical Note
Pls check in with her later today to make sure she is able to get an appt with diab ed within 2 weeks.

## 2023-02-01 NOTE — PROGRESS NOTES
"  Assessment & Plan     Type 2 diabetes mellitus with hyperglycemia, with long-term current use of insulin (H)  Inadequate control, but having low sugars. She doesn't have her estiven device to review glucose levels, but I'm hesitant to make insulin changes today as she is getting symptomatic when sugars as low as 120. Will attempt to get her expedited visit with diab ed. Discussed potential complications from sustained A1C in 12 range. Sent increased dose semaglutide to pharmacy but not covered. Re-sent 2 mg dose.   - AMB Adult Diabetes Educator Referral; Future  - Hemoglobin A1c; Future  - Basic metabolic panel  (Ca, Cl, CO2, Creat, Gluc, K, Na, BUN); Future  - Albumin Random Urine Quantitative with Creat Ratio; Future  - Hemoglobin A1c  - Basic metabolic panel  (Ca, Cl, CO2, Creat, Gluc, K, Na, BUN)  - Albumin Random Urine Quantitative with Creat Ratio  - Semaglutide, 2 MG/DOSE, (OZEMPIC, 2 MG/DOSE,) 8 MG/3ML pen; Inject 2 mg Subcutaneous once a week    LUCIO (obstructive sleep apnea)  On CPAP    Morbid obesity (H)  Initially lost weight with semaglutide, bt recently stable. She feels there will now be less stress after holidays and would like to work on weight loss on her own. She will let me know if she would like referral to weight management.     Essential hypertension  Elevated today, same on recheck. Had energy drink right before coming. She will continue to monitor at home.      30 minutes spent on the date of the encounter doing chart review, history and exam, documentation and further activities per the note       BMI:   Estimated body mass index is 34.83 kg/m  as calculated from the following:    Height as of this encounter: 1.63 m (5' 4.17\").    Weight as of this encounter: 92.5 kg (204 lb).   Weight management plan: Discussed healthy diet and exercise guidelines    See Patient Instructions    Return in about 3 months (around 5/1/2023) for Follow Up Visit.    Heidy Jama MD  [facility] ZAYNAB Reeves is a 52 year old, presenting for the following health issues:  Diabetes      History of Present Illness       Diabetes:   She presents for follow up of diabetes.  She is checking home blood glucose four or more times daily. She checks blood glucose before and after meals.  Blood glucose is sometimes over 200 and sometimes under 70. She is aware of hypoglycemia symptoms including shakiness and weakness. She has no concerns regarding her diabetes at this time.  She is not experiencing numbness or burning in feet, excessive thirst, blurry vision, weight changes or redness, sores or blisters on feet.         She eats 0-1 servings of fruits and vegetables daily.She consumes 1 sweetened beverage(s) daily.She exercises with enough effort to increase her heart rate 9 or less minutes per day.  She exercises with enough effort to increase her heart rate 3 or less days per week. She is missing 2 dose(s) of medications per week.       Diabetes Follow-up    How often are you checking your blood sugar? Four or more times daily  Blood sugar testing frequency justification:  Adjustment of medication(s)  What time of day are you checking your blood sugars (select all that apply)?  Before and after meals  Have you had any blood sugars above 200?  Yes   Have you had any blood sugars below 70?  Yes     What symptoms do you notice when your blood sugar is low?  Shaky and Weak    What concerns do you have today about your diabetes? None     Do you have any of these symptoms? (Select all that apply)  No numbness or tingling in feet.  No redness, sores or blisters on feet.  No complaints of excessive thirst.  No reports of blurry vision.  No significant changes to weight.    Feels nausea when glucose level goes down to 129. Feels shaky and weak. Drinks juice and then feels better. When she is at work, her sliding scale tends to make her run lower. Sometimes eats a meal at work, other times she doesn't.  "Those days, her glucose stays closer to 200.  Uses freestyle estiven.     Hyperlipidemia Follow-Up      Are you regularly taking any medication or supplement to lower your cholesterol?   Yes- lipitor    Are you having muscle aches or other side effects that you think could be caused by your cholesterol lowering medication?  No    Hypertension Follow-up      Do you check your blood pressure regularly outside of the clinic? Yes     Are you following a low salt diet? No    Are your blood pressures ever more than 140 on the top number (systolic) OR more   than 90 on the bottom number (diastolic), for example 140/90? No    BP Readings from Last 2 Encounters:   02/01/23 (!) 162/100   11/02/22 120/78     Hemoglobin A1C (%)   Date Value   11/02/2022 12.6 (H)   08/01/2022 12.8 (H)   06/07/2019 8.5 (H)   03/06/2019 9.5 (H)     LDL Cholesterol Calculated (mg/dL)   Date Value   08/01/2022 97   03/16/2019 54   02/21/2018 84       Review of Systems   Constitutional, HEENT, cardiovascular, pulmonary, gi and gu systems are negative, except as otherwise noted.      Objective    BP (!) 162/100   Pulse 103   Temp 98.8  F (37.1  C) (Tympanic)   Resp 16   Ht 1.63 m (5' 4.17\")   Wt 92.5 kg (204 lb)   LMP 01/18/2013   SpO2 98%   BMI 34.83 kg/m    Body mass index is 34.83 kg/m .  Physical Exam   GENERAL: healthy, alert and no distress    Results for orders placed or performed in visit on 02/01/23 (from the past 24 hour(s))   Hemoglobin A1c   Result Value Ref Range    Hemoglobin A1C 12.7 (H) 0.0 - 5.6 %    Narrative    Reviewed, ok with previous.   Result confirmed by repeat test.    Basic metabolic panel  (Ca, Cl, CO2, Creat, Gluc, K, Na, BUN)   Result Value Ref Range    Sodium 139 136 - 145 mmol/L    Potassium 3.9 3.4 - 5.3 mmol/L    Chloride 100 98 - 107 mmol/L    Carbon Dioxide (CO2) 26 22 - 29 mmol/L    Anion Gap 13 7 - 15 mmol/L    Urea Nitrogen 14.1 6.0 - 20.0 mg/dL    Creatinine 0.55 0.51 - 0.95 mg/dL    Calcium 10.5 (H) 8.6 - " 10.0 mg/dL    Glucose 171 (H) 70 - 99 mg/dL    GFR Estimate >90 >60 mL/min/1.73m2

## 2023-02-01 NOTE — PATIENT INSTRUCTIONS
Increase ozempic or wegovy (semaglutide) to 2.4 mg weekly.  I've placed an urgent referral to diabetes education. Bring any freestyle equipment.    Let me know if you have difficulty getting an appointment with diab ed within the next 2 weeks.     Stop the energy drinks.

## 2023-02-01 NOTE — TELEPHONE ENCOUNTER
We received a script today for semaglutide 2.4mg/dose.  At this strength, it is only available as Wegovy, and the patient's insurance does not cover Wegovy (says it's a plan exclusion, would not be covered even with a PA).    Ozempic 2mg is covered (and has recently been in stock more reliably than a few months ago). If appropriate, please send a new rx for Ozempic 2mg.    Thank you,  Shama [facility]

## 2023-02-02 ENCOUNTER — TELEPHONE (OUTPATIENT)
Dept: PEDIATRICS | Facility: CLINIC | Age: 53
End: 2023-02-02
Payer: COMMERCIAL

## 2023-02-02 NOTE — TELEPHONE ENCOUNTER
Diabetes Education Scheduling Outreach #1:    Call to patient to schedule. Left message with phone number to call to schedule.    Also sent [employer] message for second attempt. Requested patient to call to schedule.    Alejandrina Temple OnCall  Diabetes and Nutrition Scheduling

## 2023-02-03 ENCOUNTER — TELEPHONE (OUTPATIENT)
Dept: PEDIATRICS | Facility: CLINIC | Age: 53
End: 2023-02-03
Payer: COMMERCIAL

## 2023-02-03 NOTE — TELEPHONE ENCOUNTER
Called patient to schedule with diabetes ed, no answer. LVM requesting a call back directly to [facility] extension.     Taryn Boudreaux, [facility]  [phone]

## 2023-02-06 NOTE — TELEPHONE ENCOUNTER
Called patient to schedule with diabetes ed, no answer. LVM requesting a call back directly to [facility] extension.     Taryn Boudreaux, [facility]  [phone]

## 2023-02-13 NOTE — TELEPHONE ENCOUNTER
Mychart sent. Ending encounter      Kassie Jarvis, EMT at 2:07 PM on February 13, 2023  [facility]  [phone]

## 2023-03-15 ENCOUNTER — TRANSFERRED RECORDS (OUTPATIENT)
Dept: HEALTH INFORMATION MANAGEMENT | Facility: CLINIC | Age: 53
End: 2023-03-15

## 2023-05-08 ENCOUNTER — MYC MEDICAL ADVICE (OUTPATIENT)
Dept: PEDIATRICS | Facility: CLINIC | Age: 53
End: 2023-05-08
Payer: COMMERCIAL

## 2023-08-09 ENCOUNTER — MYC MEDICAL ADVICE (OUTPATIENT)
Dept: PEDIATRICS | Facility: CLINIC | Age: 53
End: 2023-08-09
Payer: COMMERCIAL

## 2023-08-14 DIAGNOSIS — Z79.4 TYPE 2 DIABETES MELLITUS WITHOUT COMPLICATION, WITH LONG-TERM CURRENT USE OF INSULIN (H): ICD-10-CM

## 2023-08-14 DIAGNOSIS — E11.9 TYPE 2 DIABETES MELLITUS WITHOUT COMPLICATION, WITH LONG-TERM CURRENT USE OF INSULIN (H): ICD-10-CM

## 2023-08-17 RX ORDER — FLASH GLUCOSE SENSOR
KIT MISCELLANEOUS
Qty: 2 EACH | Refills: 5 | Status: SHIPPED | OUTPATIENT
Start: 2023-08-17 | End: 2023-11-06

## 2023-08-17 NOTE — TELEPHONE ENCOUNTER
Prescription approved per [facility] Refill Protocol.  Next 5 appointments (look out 90 days)      Nov 06, 2023  9:20 AM  (Arrive by 9:00 AM)  Provider Visit with Heidy Jama MD  [facility] ([facility] ) [address] [place] [id number] [phone]

## 2023-09-17 ENCOUNTER — HEALTH MAINTENANCE LETTER (OUTPATIENT)
Age: 53
End: 2023-09-17

## 2023-09-20 ENCOUNTER — E-VISIT (OUTPATIENT)
Dept: PEDIATRICS | Facility: CLINIC | Age: 53
End: 2023-09-20
Payer: COMMERCIAL

## 2023-09-20 DIAGNOSIS — N39.0 ACUTE UTI (URINARY TRACT INFECTION): Primary | ICD-10-CM

## 2023-09-20 PROCEDURE — 99422 OL DIG E/M SVC 11-20 MIN: CPT | Performed by: INTERNAL MEDICINE

## 2023-09-20 RX ORDER — NITROFURANTOIN 25; 75 MG/1; MG/1
100 CAPSULE ORAL 2 TIMES DAILY
Qty: 10 CAPSULE | Refills: 0 | Status: SHIPPED | OUTPATIENT
Start: 2023-09-20 | End: 2023-09-25

## 2023-09-20 NOTE — PATIENT INSTRUCTIONS
Dear Lala George    After reviewing your responses, I've been able to diagnose you with a urinary tract infection, which is a common infection of the bladder with bacteria.  This is not a sexually transmitted infection, though urinating immediately after intercourse can help prevent infections.  Drinking lots of fluids is also helpful to clear your current infection and prevent the next one.      I have sent a prescription for antibiotics to your pharmacy to treat this infection.    It is important that you take all of your prescribed medication even if your symptoms are improving after a few doses.  Taking all of your medicine helps prevent the symptoms from returning.     If your symptoms worsen, you develop pain in your back or stomach, develop fevers, or are not improving in 5 days, please contact your primary care provider for an appointment or visit any of our convenient Walk-in or Urgent Care Centers to be seen, which can be found on our website here.    Thanks again for choosing us as your health care partner,    Heidy Jama MD

## 2023-09-21 LAB
ALBUMIN UR-MCNC: 100 MG/DL
APPEARANCE UR: ABNORMAL
BACTERIA #/AREA URNS HPF: ABNORMAL /HPF
BILIRUB UR QL STRIP: NEGATIVE
COLOR UR AUTO: YELLOW
GLUCOSE UR STRIP-MCNC: 500 MG/DL
HGB UR QL STRIP: ABNORMAL
KETONES UR STRIP-MCNC: NEGATIVE MG/DL
LEUKOCYTE ESTERASE UR QL STRIP: ABNORMAL
NITRATE UR QL: NEGATIVE
PH UR STRIP: 6 [PH] (ref 5–7)
RBC #/AREA URNS AUTO: ABNORMAL /HPF
SP GR UR STRIP: >=1.03 (ref 1–1.03)
SQUAMOUS #/AREA URNS AUTO: ABNORMAL /LPF
UROBILINOGEN UR STRIP-ACNC: 0.2 E.U./DL
WBC #/AREA URNS AUTO: ABNORMAL /HPF
WBC CLUMPS #/AREA URNS HPF: PRESENT /HPF

## 2023-09-21 PROCEDURE — 87086 URINE CULTURE/COLONY COUNT: CPT | Performed by: INTERNAL MEDICINE

## 2023-09-21 PROCEDURE — 81001 URINALYSIS AUTO W/SCOPE: CPT | Performed by: INTERNAL MEDICINE

## 2023-09-21 PROCEDURE — 87186 SC STD MICRODIL/AGAR DIL: CPT | Performed by: INTERNAL MEDICINE

## 2023-09-23 LAB — BACTERIA UR CULT: ABNORMAL

## 2023-11-06 ENCOUNTER — OFFICE VISIT (OUTPATIENT)
Dept: PEDIATRICS | Facility: CLINIC | Age: 53
End: 2023-11-06
Payer: COMMERCIAL

## 2023-11-06 VITALS
TEMPERATURE: 97.8 F | OXYGEN SATURATION: 97 % | BODY MASS INDEX: 32.61 KG/M2 | SYSTOLIC BLOOD PRESSURE: 145 MMHG | DIASTOLIC BLOOD PRESSURE: 70 MMHG | HEART RATE: 114 BPM | WEIGHT: 191 LBS | HEIGHT: 64 IN | RESPIRATION RATE: 18 BRPM

## 2023-11-06 DIAGNOSIS — F43.23 ADJUSTMENT DISORDER WITH MIXED ANXIETY AND DEPRESSED MOOD: ICD-10-CM

## 2023-11-06 DIAGNOSIS — Z00.00 ROUTINE GENERAL MEDICAL EXAMINATION AT A HEALTH CARE FACILITY: ICD-10-CM

## 2023-11-06 DIAGNOSIS — E78.5 HYPERLIPIDEMIA LDL GOAL <100: ICD-10-CM

## 2023-11-06 DIAGNOSIS — E66.09 CLASS 1 OBESITY DUE TO EXCESS CALORIES WITH SERIOUS COMORBIDITY AND BODY MASS INDEX (BMI) OF 32.0 TO 32.9 IN ADULT: ICD-10-CM

## 2023-11-06 DIAGNOSIS — G43.009 MIGRAINE WITHOUT AURA AND WITHOUT STATUS MIGRAINOSUS, NOT INTRACTABLE: ICD-10-CM

## 2023-11-06 DIAGNOSIS — I10 ESSENTIAL HYPERTENSION, BENIGN: ICD-10-CM

## 2023-11-06 DIAGNOSIS — E66.811 CLASS 1 OBESITY DUE TO EXCESS CALORIES WITH SERIOUS COMORBIDITY AND BODY MASS INDEX (BMI) OF 32.0 TO 32.9 IN ADULT: ICD-10-CM

## 2023-11-06 DIAGNOSIS — Z79.4 TYPE 2 DIABETES MELLITUS WITH HYPERGLYCEMIA, WITH LONG-TERM CURRENT USE OF INSULIN (H): ICD-10-CM

## 2023-11-06 DIAGNOSIS — E11.65 TYPE 2 DIABETES MELLITUS WITH HYPERGLYCEMIA, WITH LONG-TERM CURRENT USE OF INSULIN (H): ICD-10-CM

## 2023-11-06 DIAGNOSIS — Z12.4 CERVICAL CANCER SCREENING: Primary | ICD-10-CM

## 2023-11-06 DIAGNOSIS — Z87.898 HISTORY OF WHEEZING: ICD-10-CM

## 2023-11-06 LAB
ALBUMIN SERPL BCG-MCNC: 4.6 G/DL (ref 3.5–5.2)
ALP SERPL-CCNC: 126 U/L (ref 35–104)
ALT SERPL W P-5'-P-CCNC: 47 U/L (ref 0–50)
ANION GAP SERPL CALCULATED.3IONS-SCNC: 16 MMOL/L (ref 7–15)
AST SERPL W P-5'-P-CCNC: 35 U/L (ref 0–45)
BILIRUB SERPL-MCNC: 0.4 MG/DL
BUN SERPL-MCNC: 19.9 MG/DL (ref 6–20)
CALCIUM SERPL-MCNC: 10.2 MG/DL (ref 8.6–10)
CHLORIDE SERPL-SCNC: 99 MMOL/L (ref 98–107)
CHOLEST SERPL-MCNC: 175 MG/DL
CREAT SERPL-MCNC: 0.55 MG/DL (ref 0.51–0.95)
DEPRECATED HCO3 PLAS-SCNC: 21 MMOL/L (ref 22–29)
EGFRCR SERPLBLD CKD-EPI 2021: >90 ML/MIN/1.73M2
GLUCOSE SERPL-MCNC: 301 MG/DL (ref 70–99)
HBA1C MFR BLD: 12.3 % (ref 0–5.6)
HDLC SERPL-MCNC: 53 MG/DL
LDLC SERPL CALC-MCNC: 80 MG/DL
NONHDLC SERPL-MCNC: 122 MG/DL
POTASSIUM SERPL-SCNC: 3.6 MMOL/L (ref 3.4–5.3)
PROT SERPL-MCNC: 7.7 G/DL (ref 6.4–8.3)
SODIUM SERPL-SCNC: 136 MMOL/L (ref 135–145)
TRIGL SERPL-MCNC: 212 MG/DL

## 2023-11-06 PROCEDURE — G0145 SCR C/V CYTO,THINLAYER,RESCR: HCPCS | Performed by: INTERNAL MEDICINE

## 2023-11-06 PROCEDURE — 80061 LIPID PANEL: CPT | Performed by: INTERNAL MEDICINE

## 2023-11-06 PROCEDURE — 99207 PR FOOT EXAM NO CHARGE: CPT | Mod: 25 | Performed by: INTERNAL MEDICINE

## 2023-11-06 PROCEDURE — 36415 COLL VENOUS BLD VENIPUNCTURE: CPT | Performed by: INTERNAL MEDICINE

## 2023-11-06 PROCEDURE — 90682 RIV4 VACC RECOMBINANT DNA IM: CPT | Performed by: INTERNAL MEDICINE

## 2023-11-06 PROCEDURE — 87624 HPV HI-RISK TYP POOLED RSLT: CPT | Performed by: INTERNAL MEDICINE

## 2023-11-06 PROCEDURE — 80053 COMPREHEN METABOLIC PANEL: CPT | Performed by: INTERNAL MEDICINE

## 2023-11-06 PROCEDURE — 99214 OFFICE O/P EST MOD 30 MIN: CPT | Mod: 25 | Performed by: INTERNAL MEDICINE

## 2023-11-06 PROCEDURE — 83036 HEMOGLOBIN GLYCOSYLATED A1C: CPT | Performed by: INTERNAL MEDICINE

## 2023-11-06 PROCEDURE — 99396 PREV VISIT EST AGE 40-64: CPT | Mod: 25 | Performed by: INTERNAL MEDICINE

## 2023-11-06 PROCEDURE — 90471 IMMUNIZATION ADMIN: CPT | Performed by: INTERNAL MEDICINE

## 2023-11-06 RX ORDER — AMLODIPINE BESYLATE 5 MG/1
5 TABLET ORAL AT BEDTIME
Qty: 90 TABLET | Refills: 3 | Status: SHIPPED | OUTPATIENT
Start: 2023-11-06

## 2023-11-06 RX ORDER — RIZATRIPTAN BENZOATE 5 MG/1
5-10 TABLET ORAL
Qty: 9 TABLET | Refills: 3 | Status: SHIPPED | OUTPATIENT
Start: 2023-11-06

## 2023-11-06 RX ORDER — METFORMIN HCL 500 MG
2000 TABLET, EXTENDED RELEASE 24 HR ORAL
Qty: 360 TABLET | Refills: 4 | Status: SHIPPED | OUTPATIENT
Start: 2023-11-06

## 2023-11-06 RX ORDER — INSULIN ASPART 100 [IU]/ML
INJECTION, SOLUTION INTRAVENOUS; SUBCUTANEOUS
Qty: 30 ML | Refills: 11 | Status: SHIPPED | OUTPATIENT
Start: 2023-11-06 | End: 2024-05-06

## 2023-11-06 RX ORDER — PEN NEEDLE, DIABETIC 30 GX 1/3"
NEEDLE, DISPOSABLE MISCELLANEOUS
Qty: 200 EACH | Refills: 11 | Status: SHIPPED | OUTPATIENT
Start: 2023-11-06

## 2023-11-06 RX ORDER — FLASH GLUCOSE SENSOR
KIT MISCELLANEOUS
Qty: 2 EACH | Refills: 11 | Status: SHIPPED | OUTPATIENT
Start: 2023-11-06 | End: 2024-05-06 | Stop reason: ALTCHOICE

## 2023-11-06 RX ORDER — SEMAGLUTIDE 2.68 MG/ML
2 INJECTION, SOLUTION SUBCUTANEOUS WEEKLY
Qty: 9 ML | Refills: 4 | Status: SHIPPED | OUTPATIENT
Start: 2023-11-06

## 2023-11-06 RX ORDER — LOSARTAN POTASSIUM AND HYDROCHLOROTHIAZIDE 25; 100 MG/1; MG/1
1 TABLET ORAL DAILY
Qty: 90 TABLET | Refills: 3 | Status: SHIPPED | OUTPATIENT
Start: 2023-11-06

## 2023-11-06 RX ORDER — ALBUTEROL SULFATE 90 UG/1
1-2 AEROSOL, METERED RESPIRATORY (INHALATION) EVERY 4 HOURS PRN
Qty: 8 G | Refills: 1 | Status: SHIPPED | OUTPATIENT
Start: 2023-11-06

## 2023-11-06 RX ORDER — ATORVASTATIN CALCIUM 10 MG/1
10 TABLET, FILM COATED ORAL DAILY
Qty: 90 TABLET | Refills: 4 | Status: SHIPPED | OUTPATIENT
Start: 2023-11-06

## 2023-11-06 ASSESSMENT — PAIN SCALES - GENERAL: PAINLEVEL: NO PAIN (0)

## 2023-11-06 ASSESSMENT — ASTHMA QUESTIONNAIRES: ACT_TOTALSCORE: 24

## 2023-11-06 NOTE — PROGRESS NOTES
SUBJECTIVE:   CC: Lala is an 53 year old who presents for preventive health visit.       11/6/2023     9:03 AM   Additional Questions   Roomed by HOLLAND Guerrero   Accompanied by SRINIVAS         11/6/2023     9:03 AM   Patient Reported Additional Medications   Patient reports taking the following new medications SRINIVAS       Healthy Habits:     Getting at least 3 servings of Calcium per day:  Yes    Bi-annual eye exam:  Yes    Dental care twice a year:  Yes    Sleep apnea or symptoms of sleep apnea:  Sleep apnea    Diet:  Regular (no restrictions)    Frequency of exercise:  None    Duration of exercise:  N/A    Taking medications regularly:  2    Barriers to taking medications:  Remembering to take    Medication side effects:  Not applicable    Additional concerns today:  Yes  History of Present Illness     Asthma:  She presents for follow up of asthma.  She has some cough, no wheezing, and no shortness of breath.  She is using a relief medication a few times a month. She does not have a controller medication. Patient is aware of the following triggers: cold air, exercise or sports and strong odors and fumes. The patient has not had a visit to the Emergency Room, Urgent Care or Hospital due to asthma since the last clinic visit.     Diabetes:   She presents for follow up of diabetes.  She is checking home blood glucose three times daily.   She checks blood glucose before and after meals.  Blood glucose is sometimes over 200 and never under 70. She is aware of hypoglycemia symptoms including shakiness, dizziness, weakness and lethargy.    She has no concerns regarding her diabetes at this time.  She is having excessive thirst.  The patient has not had a diabetic eye exam in the last 12 months.          Hypertension: She presents for follow up of hypertension.  She does not check blood pressure  regularly outside of the clinic. Outside blood pressures have been over 140/90. She follows a low salt diet.     Reason for visit:   Followup/preventative care    She eats 0-1 servings of fruits and vegetables daily.She consumes 1 sweetened beverage(s) daily.She exercises with enough effort to increase her heart rate 10 to 19 minutes per day.  She exercises with enough effort to increase her heart rate 3 or less days per week. She is missing 2 dose(s) of medications per week.  She is not taking prescribed medications regularly due to remembering to take.    Degludec 50-70 units once daily.  Sliding scale novolog pre-meal. 33 units/day.  Adjusting insulin daily with sliding scale. Glucose levels, when they go low, she gets dizzy and shaky, so does try to keep her glucose levels more in the 100 range.         Social History     Tobacco Use    Smoking status: Never    Smokeless tobacco: Never   Substance Use Topics    Alcohol use: Yes     Comment: rare--one to two per week           8/1/2022     6:54 AM   Alcohol Use   Prescreen: >3 drinks/day or >7 drinks/week? No     Reviewed orders with patient.  Reviewed health maintenance and updated orders accordingly - Yes  Lab work is in process  Labs reviewed in EPIC    Breast Cancer Screening:    FHS-7:       1/6/2022     1:24 PM 1/6/2022     1:28 PM 8/1/2022     7:02 AM 1/12/2023    10:01 AM 11/6/2023     9:03 AM   Breast CA Risk Assessment (FHS-7)   Did any of your first-degree relatives have breast or ovarian cancer? Yes  Yes Yes Yes   Did any of your relatives have bilateral breast cancer? No Unknown Yes Yes Yes   Did any man in your family have breast cancer? No  No No No   Did any woman in your family have breast and ovarian cancer? No  Yes No Yes   Did any woman in your family have breast cancer before age 50 y? Yes  Yes Yes Yes   Do you have 2 or more relatives with breast and/or ovarian cancer? No  No No No   Do you have 2 or more relatives with breast and/or bowel cancer? No  No No No       Mammogram Screening: Recommended mammography every 1-2 years with patient discussion and risk factor  "consideration  Pertinent mammograms are reviewed under the imaging tab.    History of abnormal Pap smear: NO - age 30-65 PAP every 5 years with negative HPV co-testing recommended      Latest Ref Rng & Units 2/21/2018     1:53 PM 7/14/2014    12:00 AM 6/22/2011     3:40 PM   PAP / HPV   PAP (Historical)  NIL  NIL  NIL    HPV 16 DNA NEG^Negative Negative      HPV 18 DNA NEG^Negative Negative      Other HR HPV NEG^Negative Negative        Reviewed and updated as needed this visit by clinical staff   Tobacco  Allergies  Meds  Problems  Med Hx  Surg Hx           Reviewed and updated as needed this visit by Provider                     Review of Systems  CONSTITUTIONAL: NEGATIVE for fever, chills, change in weight  INTEGUMENTARY/SKIN: NEGATIVE for worrisome rashes, moles or lesions  EYES: NEGATIVE for vision changes or irritation  ENT: NEGATIVE for ear, mouth and throat problems  RESP: NEGATIVE for significant cough or SOB  BREAST: NEGATIVE for masses, tenderness or discharge  CV: NEGATIVE for chest pain, palpitations or peripheral edema  GI: NEGATIVE for nausea, abdominal pain, heartburn, or change in bowel habits  : NEGATIVE for unusual urinary or vaginal symptoms. No vaginal bleeding.  MUSCULOSKELETAL: NEGATIVE for significant arthralgias or myalgia  NEURO: NEGATIVE for weakness, dizziness or paresthesias  PSYCHIATRIC: NEGATIVE for changes in mood or affect      OBJECTIVE:   BP (!) 151/83   Pulse 114   Temp 97.8  F (36.6  C) (Oral)   Resp 18   Ht 1.626 m (5' 4\")   Wt 86.6 kg (191 lb)   LMP 01/18/2013   SpO2 97%   BMI 32.79 kg/m    Physical Exam  GENERAL: healthy, alert and no distress  EYES: Eyes grossly normal to inspection, PERRL and conjunctivae and sclerae normal  NECK: no adenopathy, no asymmetry, masses, or scars and thyroid normal to palpation  RESP: lungs clear to auscultation - no rales, rhonchi or wheezes  CV: regular rate and rhythm, normal S1 S2, no S3 or S4, no murmur, click or rub, no " peripheral edema and peripheral pulses strong  ABDOMEN: soft, nontender, no hepatosplenomegaly, no masses and bowel sounds normal   (female): normal female external genitalia, normal urethral meatus , vaginal mucosal atrophy, and normal cervix, adnexae, and uterus without masses.  MS: no gross musculoskeletal defects noted, no edema  SKIN: no suspicious lesions or rashes  NEURO: Normal strength and tone, mentation intact and speech normal  PSYCH: mentation appears normal, affect normal/bright  DIABETIC FOOT: no ulceration, normal DP/PT pulses, normal monofilament exam    Diagnostic Test Results:  Labs reviewed in Epic    ASSESSMENT/PLAN:     1. Routine general medical examination at a health care facility      2. Type 2 diabetes mellitus with hyperglycemia, with long-term current use of insulin (H)  A1C today. On max dose semaglutide. Discussed check to see if mounjaro is covered. Continues to require insulin at higher doses. Recommend she meet with MTM to review. Continues to use CGM, but worries about low sugars so sugars tend to run a bit higher. Continue metformin as well.   - HEMOGLOBIN A1C; Future  - Lipid panel reflex to direct LDL Non-fasting; Future  - Med Therapy Management Referral  - Semaglutide, 2 MG/DOSE, (OZEMPIC, 2 MG/DOSE,) 8 MG/3ML pen; Inject 2 mg Subcutaneous once a week  Dispense: 9 mL; Refill: 4  - metFORMIN (GLUCOPHAGE XR) 500 MG 24 hr tablet; Take 4 tablets (2,000 mg) by mouth daily (with dinner) DUE FOR ENDO FOLLOW UP - PLEASE SCHEDULE.  Dispense: 360 tablet; Refill: 4  - insulin degludec (TRESIBA) 200 UNIT/ML pen; 50-70 units subcutaneous daily, max dose  Dispense: 35 mL; Refill: 11  - insulin aspart (NOVOLOG FLEXPEN) 100 UNIT/ML pen; 25 units before each meal + 1 unit per 25 points of blood sugar above 150. Total daily dose 80 units per day  Dispense: 30 mL; Refill: 11  - Continuous Blood Gluc Sensor (FREESTYLE TEODORA 14 DAY SENSOR) [facility]; USE ONE EVERY 14 DAYS  Dispense: 2 each; Refill:  "11  - insulin pen needle (NOVOTWIST PEN NEEDLE) 32G X 5 MM; USE FOUR PEN NEEDLES DAILY AS DIRECTED  Dispense: 200 each; Refill: 11  - Comprehensive metabolic panel; Future  - HEMOGLOBIN A1C  - Lipid panel reflex to direct LDL Non-fasting  - Comprehensive metabolic panel  - FOOT EXAM    3. Class 1 obesity due to excess calories with serious comorbidity and body mass index (BMI) of 32.0 to 32.9 in adult  On semaglutide. Weight is down overall 20-30 lbs.    4. Essential hypertension, benign  Elevated today, even after recheck. Adding amlodipine at bedtime.  - losartan-hydrochlorothiazide (HYZAAR) 100-25 MG tablet; Take 1 tablet by mouth daily  Dispense: 90 tablet; Refill: 3  - amLODIPine (NORVASC) 5 MG tablet; Take 1 tablet (5 mg) by mouth at bedtime  Dispense: 90 tablet; Refill: 3    5. Migraine without aura and without status migrainosus, not intractable  refilled  - rizatriptan (MAXALT) 5 MG tablet; Take 1-2 tablets (5-10 mg) by mouth at onset of headache for migraine May repeat in 2 hours. Max 6 tablets/24 hours.  Dispense: 9 tablet; Refill: 3    6. Hyperlipidemia LDL goal <100  Doing well  - atorvastatin (LIPITOR) 10 MG tablet; Take 1 tablet (10 mg) by mouth daily  Dispense: 90 tablet; Refill: 4    7. Adjustment disorder with mixed anxiety and depressed mood  stable  - sertraline (ZOLOFT) 50 MG tablet; Take 1 tablet (50 mg) by mouth daily  Dispense: 90 tablet; Refill: 3    8. History of wheezing    - albuterol (VENTOLIN HFA) 108 (90 Base) MCG/ACT inhaler; Inhale 1-2 puffs into the lungs every 4 hours as needed for wheezing  Dispense: 8 g; Refill: 1    9. Cervical cancer screening    - Pap Screen with HPV - recommended age 30 - 65 years    COUNSELING:  Reviewed preventive health counseling, as reflected in patient instructions      BMI:   Estimated body mass index is 32.79 kg/m  as calculated from the following:    Height as of this encounter: 1.626 m (5' 4\").    Weight as of this encounter: 86.6 kg (191 lb). "   Weight management plan: Discussed healthy diet and exercise guidelines      She reports that she has never smoked. She has never used smokeless tobacco.          Heidy Jama MD  [facility]

## 2023-11-06 NOTE — PATIENT INSTRUCTIONS
Check and see if the Mounjaro is covered. This may help more with blood sugars and might help you need less insulin.     Make sure to drink at least 1.5 liters of water/day.    Add amlodipine 5 mg tablets at bedtime. Start with 1/2 tablet for 3 tablets, then increase to 1 full tablet.  Check blood pressure at home. If you are dizzy, decrease back to 1/2 tablet and let me know.  Ideal blood pressure <130/80.  Let me know how your blood pressures are running at home in 4 weeks.       Preventive Health Recommendations  Female Ages 50 - 64    Yearly exam: See your health care provider every year in order to  Review health changes.   Discuss preventive care.    Review your medicines if your doctor has prescribed any.    Get a Pap test every three years (unless you have an abnormal result and your provider advises testing more often).  If you get Pap tests with HPV test, you only need to test every 5 years, unless you have an abnormal result.   You do not need a Pap test if your uterus was removed (hysterectomy) and you have not had cancer.  You should be tested each year for STDs (sexually transmitted diseases) if you're at risk.   Have a mammogram every 1 to 2 years.  Have a colonoscopy at age 45, or have a yearly FIT test (stool test). These exams screen for colon cancer.    Have a cholesterol test every 5 years, or more often if advised.  Have a diabetes test (fasting glucose) every three years. If you are at risk for diabetes, you should have this test more often.   If you are at risk for osteoporosis (brittle bone disease), think about having a bone density scan (DEXA).    Shots: Get a flu shot each year. Get a tetanus shot every 10 years.    Nutrition:   Eat at least 5 servings of fruits and vegetables each day.  Eat whole-grain bread, whole-wheat pasta and brown rice instead of white grains and rice.  Get adequate Calcium and Vitamin D.     Lifestyle  Exercise at least 150 minutes a week (30 minutes a day, 5 days  a week). This will help you control your weight and prevent disease.  Limit alcohol to one drink per day.  No smoking.   Wear sunscreen to prevent skin cancer.   See your dentist every six months for an exam and cleaning.  See your eye doctor every 1 to 2 years.

## 2023-11-08 LAB
BKR LAB AP GYN ADEQUACY: NORMAL
BKR LAB AP GYN INTERPRETATION: NORMAL
BKR LAB AP HPV REFLEX: NORMAL
BKR LAB AP PREVIOUS ABNORMAL: NORMAL
PATH REPORT.COMMENTS IMP SPEC: NORMAL
PATH REPORT.COMMENTS IMP SPEC: NORMAL
PATH REPORT.RELEVANT HX SPEC: NORMAL

## 2023-11-09 LAB
HUMAN PAPILLOMA VIRUS 16 DNA: NEGATIVE
HUMAN PAPILLOMA VIRUS 18 DNA: NEGATIVE
HUMAN PAPILLOMA VIRUS FINAL DIAGNOSIS: NORMAL
HUMAN PAPILLOMA VIRUS OTHER HR: NEGATIVE

## 2024-01-07 ENCOUNTER — E-VISIT (OUTPATIENT)
Dept: PEDIATRICS | Facility: CLINIC | Age: 54
End: 2024-01-07
Payer: COMMERCIAL

## 2024-01-07 DIAGNOSIS — J01.00 ACUTE NON-RECURRENT MAXILLARY SINUSITIS: Primary | ICD-10-CM

## 2024-01-07 PROCEDURE — 99422 OL DIG E/M SVC 11-20 MIN: CPT | Performed by: INTERNAL MEDICINE

## 2024-01-08 RX ORDER — IPRATROPIUM BROMIDE 42 UG/1
2 SPRAY, METERED NASAL 4 TIMES DAILY
Qty: 15 ML | Refills: 0 | Status: SHIPPED | OUTPATIENT
Start: 2024-01-08 | End: 2024-05-06

## 2024-01-12 ENCOUNTER — TELEPHONE (OUTPATIENT)
Dept: PEDIATRICS | Facility: CLINIC | Age: 54
End: 2024-01-12
Payer: COMMERCIAL

## 2024-01-12 DIAGNOSIS — Z79.4 TYPE 2 DIABETES MELLITUS WITH HYPERGLYCEMIA, WITH LONG-TERM CURRENT USE OF INSULIN (H): Primary | ICD-10-CM

## 2024-01-12 DIAGNOSIS — E11.65 TYPE 2 DIABETES MELLITUS WITH HYPERGLYCEMIA, WITH LONG-TERM CURRENT USE OF INSULIN (H): Primary | ICD-10-CM

## 2024-01-12 NOTE — TELEPHONE ENCOUNTER
Pts 2024 insurance wants Humalog products instead of novolog. She has an rx from 11/6 for Novolog Flexpen and I'm hoping you could send a new order for the Humalog Pens - same dose. I think Dr. Jama is gone today and hoping someone could review. Thanks   Heidy Martins, PharmD  [facility]  [phone]

## 2024-01-15 RX ORDER — INSULIN LISPRO 100 [IU]/ML
INJECTION, SOLUTION INTRAVENOUS; SUBCUTANEOUS
Qty: 30 ML | Refills: 11 | Status: SHIPPED | OUTPATIENT
Start: 2024-01-15

## 2024-02-05 ENCOUNTER — HOSPITAL ENCOUNTER (OUTPATIENT)
Dept: MAMMOGRAPHY | Facility: CLINIC | Age: 54
Discharge: HOME | End: 2024-02-05
Attending: INTERNAL MEDICINE | Admitting: INTERNAL MEDICINE
Payer: COMMERCIAL

## 2024-02-05 DIAGNOSIS — Z12.31 VISIT FOR SCREENING MAMMOGRAM: ICD-10-CM

## 2024-02-05 PROCEDURE — 77063 BREAST TOMOSYNTHESIS BI: CPT

## 2024-05-06 ENCOUNTER — OFFICE VISIT (OUTPATIENT)
Dept: PEDIATRICS | Facility: CLINIC | Age: 54
End: 2024-05-06
Payer: COMMERCIAL

## 2024-05-06 ENCOUNTER — OFFICE VISIT (OUTPATIENT)
Dept: PHARMACY | Facility: CLINIC | Age: 54
End: 2024-05-06
Attending: INTERNAL MEDICINE
Payer: COMMERCIAL

## 2024-05-06 VITALS
SYSTOLIC BLOOD PRESSURE: 131 MMHG | OXYGEN SATURATION: 99 % | DIASTOLIC BLOOD PRESSURE: 83 MMHG | HEIGHT: 64 IN | TEMPERATURE: 97.8 F | HEART RATE: 93 BPM | BODY MASS INDEX: 31.67 KG/M2 | WEIGHT: 185.5 LBS | RESPIRATION RATE: 18 BRPM

## 2024-05-06 DIAGNOSIS — Z78.9 TAKES DIETARY SUPPLEMENTS: ICD-10-CM

## 2024-05-06 DIAGNOSIS — E11.65 TYPE 2 DIABETES MELLITUS WITH HYPERGLYCEMIA, WITH LONG-TERM CURRENT USE OF INSULIN (H): Primary | ICD-10-CM

## 2024-05-06 DIAGNOSIS — F43.23 ADJUSTMENT DISORDER WITH MIXED ANXIETY AND DEPRESSED MOOD: ICD-10-CM

## 2024-05-06 DIAGNOSIS — E78.5 HYPERLIPIDEMIA LDL GOAL <100: ICD-10-CM

## 2024-05-06 DIAGNOSIS — Z79.4 TYPE 2 DIABETES MELLITUS WITH HYPERGLYCEMIA, WITH LONG-TERM CURRENT USE OF INSULIN (H): Primary | ICD-10-CM

## 2024-05-06 DIAGNOSIS — G43.109 MIGRAINE WITH AURA AND WITHOUT STATUS MIGRAINOSUS, NOT INTRACTABLE: ICD-10-CM

## 2024-05-06 DIAGNOSIS — L40.9 PSORIASIS: ICD-10-CM

## 2024-05-06 DIAGNOSIS — Z23 NEED FOR COVID-19 VACCINE: ICD-10-CM

## 2024-05-06 DIAGNOSIS — I10 ESSENTIAL HYPERTENSION: ICD-10-CM

## 2024-05-06 DIAGNOSIS — Z23 NEED FOR DIPHTHERIA-TETANUS-PERTUSSIS (TDAP) VACCINE: ICD-10-CM

## 2024-05-06 DIAGNOSIS — J45.30 MILD PERSISTENT ASTHMA WITHOUT COMPLICATION: ICD-10-CM

## 2024-05-06 LAB
CREAT UR-MCNC: 124 MG/DL
HBA1C MFR BLD: 12 % (ref 0–5.6)
MICROALBUMIN UR-MCNC: 104 MG/L
MICROALBUMIN/CREAT UR: 83.87 MG/G CR (ref 0–25)

## 2024-05-06 PROCEDURE — 99214 OFFICE O/P EST MOD 30 MIN: CPT | Mod: 25 | Performed by: INTERNAL MEDICINE

## 2024-05-06 PROCEDURE — 36415 COLL VENOUS BLD VENIPUNCTURE: CPT | Performed by: INTERNAL MEDICINE

## 2024-05-06 PROCEDURE — 90715 TDAP VACCINE 7 YRS/> IM: CPT | Performed by: INTERNAL MEDICINE

## 2024-05-06 PROCEDURE — 99607 MTMS BY PHARM ADDL 15 MIN: CPT | Performed by: PHARMACIST

## 2024-05-06 PROCEDURE — 82043 UR ALBUMIN QUANTITATIVE: CPT | Performed by: INTERNAL MEDICINE

## 2024-05-06 PROCEDURE — 82570 ASSAY OF URINE CREATININE: CPT | Performed by: INTERNAL MEDICINE

## 2024-05-06 PROCEDURE — 99605 MTMS BY PHARM NP 15 MIN: CPT | Performed by: PHARMACIST

## 2024-05-06 PROCEDURE — 83036 HEMOGLOBIN GLYCOSYLATED A1C: CPT | Performed by: INTERNAL MEDICINE

## 2024-05-06 PROCEDURE — 91320 SARSCV2 VAC 30MCG TRS-SUC IM: CPT | Performed by: INTERNAL MEDICINE

## 2024-05-06 PROCEDURE — 90480 ADMN SARSCOV2 VAC 1/ONLY CMP: CPT | Performed by: INTERNAL MEDICINE

## 2024-05-06 PROCEDURE — 90471 IMMUNIZATION ADMIN: CPT | Performed by: INTERNAL MEDICINE

## 2024-05-06 RX ORDER — CLOBETASOL PROPIONATE 0.5 MG/ML
SOLUTION TOPICAL PRN
COMMUNITY
Start: 2024-03-19 | End: 2024-05-06

## 2024-05-06 RX ORDER — CLOBETASOL PROPIONATE 0.5 MG/ML
SOLUTION TOPICAL EVERY OTHER DAY
COMMUNITY

## 2024-05-06 RX ORDER — BLOOD-GLUCOSE SENSOR
1 EACH MISCELLANEOUS
Qty: 6 EACH | Refills: 3 | Status: SHIPPED | OUTPATIENT
Start: 2024-05-06

## 2024-05-06 ASSESSMENT — PAIN SCALES - GENERAL: PAINLEVEL: NO PAIN (0)

## 2024-05-06 ASSESSMENT — ASTHMA QUESTIONNAIRES
ACT_TOTALSCORE: 22
QUESTION_1 LAST FOUR WEEKS HOW MUCH OF THE TIME DID YOUR ASTHMA KEEP YOU FROM GETTING AS MUCH DONE AT WORK, SCHOOL OR AT HOME: NONE OF THE TIME
QUESTION_5 LAST FOUR WEEKS HOW WOULD YOU RATE YOUR ASTHMA CONTROL: WELL CONTROLLED
QUESTION_2 LAST FOUR WEEKS HOW OFTEN HAVE YOU HAD SHORTNESS OF BREATH: ONCE OR TWICE A WEEK
QUESTION_3 LAST FOUR WEEKS HOW OFTEN DID YOUR ASTHMA SYMPTOMS (WHEEZING, COUGHING, SHORTNESS OF BREATH, CHEST TIGHTNESS OR PAIN) WAKE YOU UP AT NIGHT OR EARLIER THAN USUAL IN THE MORNING: NOT AT ALL
QUESTION_4 LAST FOUR WEEKS HOW OFTEN HAVE YOU USED YOUR RESCUE INHALER OR NEBULIZER MEDICATION (SUCH AS ALBUTEROL): ONCE A WEEK OR LESS
ACT_TOTALSCORE: 22

## 2024-05-06 NOTE — PROGRESS NOTES
Medication Therapy Management (MTM) Encounter    ASSESSMENT:                            Medication Adherence/Access: see below    Diabetes:   Patient is not meeting A1c goal of < 7%. Minimal continuous glucose monitor data to review but data available were not at goal either. Patient is due for repeated a1c and microalbuminuria.   Patient would benefit from additional therapy such as SGLT2i. Patient would benefit staying up to date vaccines per ACIP Adult Vaccine schedule.    Hypertension:   Patient is not meeting blood pressure goal of < 130/80mmHg. However, no change since patient would benefit improving adherence.     Hyperlipidemia:   Stable.    Anxiety and Depression:   Stable.    Asthma:   Stable.    Migraine:  stable.     Psoriasis:  Stable    Supplements:  stable.     PLAN:                            1) Move all oral medication and supplements to before you go to work.      2) Change to freestyle estiven 3     3) Start Jardiance 10 mg with other oral medications. The pharmacy has helped us start a prior authorization.    4) Change the Humalog to add 1 unit for every 25 pts instead, see below:  100-150 mg/dL:    25 units  150-174 mg/dL:    add 1 unit of insulin/total 26 units  175-199 mg/dL:    add 2 units/total 27 units  200-224 mg/dL:    add 3 units/total 28 units  225-249 mg/dL:    add 4 units/total 29 units    250-274 mg/dL:    add 5 units/total 30 units  275-299 mg/dL:    add 6 units/total 31 units  300-324 mg/dL:    add 7 units/total 32 units  325-349 mg/dL:    add 8 units/total 33 units  350-374 mg/dL:    add 9 units/total 34 units  375-399 mg/dL:    add 10 units/total 35 units  >400 mg/dL:         add 11 units/total 36 units and let us know you have been running this high still     5) Covid booster today     6) Provided patient with savings cards info    Follow-up: set up diabetes education in 4-6 weeks; MTM as needed due to private pay; 3 month primary care provider      SUBJECTIVE/OBJECTIVE:            "               Lala George is a 53 year old female coming in for an initial visit. She was referred to me from Dr. Jama. Today's visit is a co-visit with primary care provider.     Reason for visit: wants to work on preventative.     Allergies/ADRs: Reviewed in chart  Past Medical History: Reviewed in chart  Tobacco: She reports that she has never smoked. She has never been exposed to tobacco smoke. She has never used smokeless tobacco.  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 On her feet a lot at work. Worries about low blood sugar when at work.     Medication Adherence/Access:   Patient takes medications directly from bottles. Taken when she gets home from work. But admits missing dose a few times in the week. Mentions increased cost with Tresiba so not  yet.   The patient fills medications at [facility]: YES.    Diabetes   Humalog 25 units + 1:25 when above 150 per RX but patient takes 25 units + 4:100 when blood sugar over 100 mg/dL   Tresiba 50-70 units daily per Rx, but takes after work (~8 am) if blood sugar \"High\" 300=/> then 60 units, if below 300 then 50 units  Metformin XR 2000 mg daily   Ozempic 2 mg weekly, Friday  - does not miss  Patient is not experiencing side effects. She has noticed less appetite with Ozempic. She only gives Humalog once per day at most. Does not like to give insulin before work due to fear of lows.   Blood sugar monitoring: Continuous Glucose Monitor Sandi 14 day, only has 1 day of info on her phone blood sugar ~340-360's. She forgot her monitor. She will get low blood sugar symptoms when down to 90's.   Current diabetes symptoms: none  Diet/Exercise: she will have a meal when she gets home from work which is usually her largest meal of the day. She will usually  something on the way or at work before work and at work. May snack on chips on occasion. She has trouble with sweets/desserts very common - chocolate, ice " cream cookies. Drinking water usually. May have zePASS tea 1-2 times once a week but will dilute with water.      Eye exam in the last 12 months? No  Foot exam is up to date  Urine Albumin:   Lab Results   Component Value Date    UMALCR 93.69 (H) 02/01/2023      Lab Results   Component Value Date    A1C 12.0 (H) 05/06/2024       Hypertension   Losartan - hydrochlorothiazide 100 - 25 mg daily   Amlodipine 5 mg daily   Patient reports no current medication side effects as long as she doesn't take medication before work shift. Patient self monitors blood pressure.  Home BP monitoring no recent blood pressure, has wrist and upper arm  will randomly check.       BP Readings from Last 3 Encounters:   05/06/24 131/83   11/06/23 (!) 145/70   02/01/23 (!) 162/100     Pulse Readings from Last 3 Encounters:   05/06/24 93   11/06/23 114   02/01/23 103       Hyperlipidemia   Atorvastatin 10 mg daily   Patient reports no significant myalgias or other side effects from medication.      Recent Labs   Lab Test 11/06/23  1040 08/01/22  0834   CHOL 175 189   HDL 53 66   LDL 80 97   TRIG 212* 129       Anxiety and Depression  Sertraline 50 mg daily    Patient reports no current medication side effects. She does feel the sertraline the helps when she takes regularly. She may notice can get more irritated at work. May have a day out of the 2 weeks of feeling down. However, feels regimen is effective. Denies anxiety. Denies side effects.    Asthma:   Albutoler HFA as needed    Triggers include: strong odors and fumes.  Patient reports the following symptoms: none.    Migraine:   Rizatriptan  5 mg  No migraine for a while. Headache from alcohol recently which is uncommon for her to have a drink but otherwise no issue.     Psoriasis:  Clobetasol 0.05% solution daily as needed   She is following dermatology. No concerns. She has a spot on the back of the neck and is improving with topical.     Supplements   Iron 1 tablet daily    Vitamin D3 1 tablet daily  No issues.        Today's Vitals: LMP 01/18/2013   ----------------    I spent 70 minutes with this patient today. All changes were made via verbal approval with Heidy Jama MD. A copy of the visit note was provided to the patient's provider(s).    A summary of these recommendations was given to the patient (see AVS from today's appointment with Dr. Jama).    Lisa Duran, PharmD  Medication Therapy Management Pharmacist       Medication Therapy Recommendations  Essential hypertension    Current Medication: amLODIPine (NORVASC) 5 MG tablet   Rationale: Patient forgets to take - Adherence - Adherence   Recommendation: Provide Adherence Intervention   Status: Patient Agreed - Adherence/Education         Type 2 diabetes mellitus with hyperglycemia (H)    Current Medication: Continuous Blood Gluc  (FREESTYLE SANDI 14 DAY READER) NADER   Rationale: More effective medication available - Ineffective medication - Effectiveness   Recommendation: Change Medication - FreeStyle Sandi 3 Sensor Misc   Status: Accepted per Provider          Current Medication: empagliflozin (JARDIANCE) 10 MG TABS tablet   Rationale: Synergistic therapy - Needs additional medication therapy - Indication   Recommendation: Start Medication   Status: Accepted per Provider          Current Medication: insulin lispro (HUMALOG KWIKPEN) 100 UNIT/ML (1 unit dial) KWIKPEN   Rationale: Does not understand instructions - Adherence - Adherence   Recommendation: Provide Education   Status: Patient Agreed - Adherence/Education          Rationale: Preventive therapy - Needs additional medication therapy - Indication   Recommendation: Order Vaccine - PFIZER COVID-19 VAC BIVALENT IM   Status: Accepted per Provider

## 2024-05-06 NOTE — PATIENT INSTRUCTIONS
Recommendation(s) from today's co-visit:                                                      1) Move all oral medication and supplements to before you go to work.     2) Change to freestyle estiven 3  Connect to the Clinic on the [facility].  Step 1: Open the Settings Menu. Click the three blue lines to open the Settings Menu.  Step 2: Click Connected Apps.  Step 3: Click Connect or Manage next to [email or web address].  Step 4: Clinic Connect to a Practice:  Step 5: Link to a practice: To connect to [facility] enter the following Practice ID: [id number]  in the field provided and click the Add button.     3) Start Jardiance 10 mg with other oral medications. The pharmacy has helped us start a prior authorization which may take a few weeks to here back. But once your insurance approves this then the pharmacy will call and you can fill and start the medication.    4) Change the Humalog to add 1 unit for every 25 pts instead, see below:  100-150 mg/dL:    25 units  150-174 mg/dL:    add 1 unit of insulin/total 26 units  175-199 mg/dL:    add 2 units/total 27 units  200-224 mg/dL:    add 3 units/total 28 units  225-249 mg/dL:    add 4 units/total 29 units    250-274 mg/dL:    add 5 units/total 30 units  275-299 mg/dL:    add 6 units/total 31 units  300-324 mg/dL:    add 7 units/total 32 units  325-349 mg/dL:    add 8 units/total 33 units  350-374 mg/dL:    add 9 units/total 34 units  375-399 mg/dL:    add 10 units/total 35 units  >400 mg/dL:         add 11 units/total 36 units and let us know you have been running this high still    5) Covid booster today    6) Tresiba or Lantus, whichever is more affordable. Savings cards may be available.     7) set up diabetes education in 4-6 weeks.    Follow-up:     Care Team contact information:                                                      Primary care provider: Heidy Jama  Clinical Pharmacist: Lisa Duran, PharmD    It was great speaking with you today.  I value your  experience and would be very thankful for your time in providing feedback in our clinic survey. In the next few days, you may receive an email or text message from [email or web address] with a link to a survey related to your visit today.    To schedule another appointment, please call the clinic directly [phone].

## 2024-05-06 NOTE — PROGRESS NOTES
"  Assessment & Plan     Type 2 diabetes mellitus with hyperglycemia, with long-term current use of insulin (H)  Difficulty with getting all doses of medications in. Plan add SGLT2 and plan on increasing dose if she tolerates well. Discussed working on getting all doses of metformin in. Taking tresiba after work.   Plan switch to freestyle 3 so glucoses automatically sync with her phone. Unfortunately MTM isn't covered. Will have her follow up with diab ed.   - Albumin Random Urine Quantitative with Creat Ratio; Future  - HEMOGLOBIN A1C; Future  - insulin degludec (TRESIBA) 200 UNIT/ML pen; 50-60 units subcutaneous daily, max dose  - **Basic metabolic panel FUTURE 14d; Future  - Adult Diabetes Education  Referral; Future  - Albumin Random Urine Quantitative with Creat Ratio  - HEMOGLOBIN A1C  - **Hemoglobin A1c FUTURE 3mo; Future  - **Comprehensive metabolic panel FUTURE 2mo; Future    Essential hypertension  Reasonable control    Mild persistent asthma without complication  Stable.    Need for diphtheria-tetanus-pertussis (Tdap) vaccine    - TDAP 10-64Y (ADACEL,BOOSTRIX)    Need for COVID-19 vaccine    - COVID-19 12+ (2023-24) (PFIZER)      30 minutes spent by me on the date of the encounter doing chart review, history and exam, documentation and further activities per the note      BMI  Estimated body mass index is 31.84 kg/m  as calculated from the following:    Height as of this encounter: 1.626 m (5' 4\").    Weight as of this encounter: 84.1 kg (185 lb 8 oz).   Weight management plan: Discussed healthy diet and exercise guidelines      See Patient Instructions    Steve Reeves is a 53 year old, presenting for the following health issues:  Diabetes        5/6/2024     9:04 AM   Additional Questions   Roomed by JOANNA Guerrero   Accompanied by N/a         5/6/2024     9:04 AM   Patient Reported Additional Medications   Patient reports taking the following new medications insulin     History of Present " "Illness       Diabetes:   She presents for follow up of diabetes.  She is checking home blood glucose two times daily.   She checks blood glucose before and after meals and at bedtime.  Blood glucose is sometimes over 200 and never under 70. She is aware of hypoglycemia symptoms including shakiness, weakness, lethargy and other.   She is concerned about blood sugar frequently over 200.   She is having excessive thirst.  The patient has not had a diabetic eye exam in the last 12 months.          She eats 0-1 servings of fruits and vegetables daily.She consumes 0 sweetened beverage(s) daily.She exercises with enough effort to increase her heart rate 9 or less minutes per day.  She exercises with enough effort to increase her heart rate 3 or less days per week. She is missing 2 dose(s) of medications per week.  She is not taking prescribed medications regularly due to remembering to take.     Missing doses of blood pressure medication.    Missing doses of diabetes medication, metformin etc. Weight coming down nicely with semaglutide, but glucose levels still elevated. Using freestyle estiven.     Review of Systems  Constitutional, HEENT, cardiovascular, pulmonary, gi and gu systems are negative, except as otherwise noted.      Objective    /83   Pulse 93   Temp 97.8  F (36.6  C) (Oral)   Resp 18   Ht 1.626 m (5' 4\")   Wt 84.1 kg (185 lb 8 oz)   LMP 01/18/2013   SpO2 99%   Breastfeeding No   BMI 31.84 kg/m    Body mass index is 31.84 kg/m .  Physical Exam   GENERAL: alert and no distress  PSYCH: mentation appears normal, affect normal/bright    Results for orders placed or performed in visit on 05/06/24 (from the past 24 hour(s))   HEMOGLOBIN A1C   Result Value Ref Range    Hemoglobin A1C 12.0 (H) 0.0 - 5.6 %    Narrative    Results consistent with previous, repeat testing unnecessary             Signed Electronically by: Heidy Jama MD    "

## 2024-06-06 ENCOUNTER — LAB (OUTPATIENT)
Dept: LAB | Facility: CLINIC | Age: 54
End: 2024-06-06
Payer: COMMERCIAL

## 2024-06-06 DIAGNOSIS — Z79.4 TYPE 2 DIABETES MELLITUS WITH HYPERGLYCEMIA, WITH LONG-TERM CURRENT USE OF INSULIN (H): ICD-10-CM

## 2024-06-06 DIAGNOSIS — E11.65 TYPE 2 DIABETES MELLITUS WITH HYPERGLYCEMIA, WITH LONG-TERM CURRENT USE OF INSULIN (H): ICD-10-CM

## 2024-06-06 PROCEDURE — 80048 BASIC METABOLIC PNL TOTAL CA: CPT

## 2024-06-06 PROCEDURE — 36415 COLL VENOUS BLD VENIPUNCTURE: CPT

## 2024-06-07 LAB
ANION GAP SERPL CALCULATED.3IONS-SCNC: 15 MMOL/L (ref 7–15)
BUN SERPL-MCNC: 16.1 MG/DL (ref 6–20)
CALCIUM SERPL-MCNC: 9.6 MG/DL (ref 8.6–10)
CHLORIDE SERPL-SCNC: 98 MMOL/L (ref 98–107)
CREAT SERPL-MCNC: 0.71 MG/DL (ref 0.51–0.95)
DEPRECATED HCO3 PLAS-SCNC: 26 MMOL/L (ref 22–29)
EGFRCR SERPLBLD CKD-EPI 2021: >90 ML/MIN/1.73M2
GLUCOSE SERPL-MCNC: 163 MG/DL (ref 70–99)
POTASSIUM SERPL-SCNC: 4.2 MMOL/L (ref 3.4–5.3)
SODIUM SERPL-SCNC: 139 MMOL/L (ref 135–145)

## 2024-08-06 ENCOUNTER — LAB (OUTPATIENT)
Dept: LAB | Facility: CLINIC | Age: 54
End: 2024-08-06
Payer: COMMERCIAL

## 2024-08-06 DIAGNOSIS — Z79.4 TYPE 2 DIABETES MELLITUS WITH HYPERGLYCEMIA, WITH LONG-TERM CURRENT USE OF INSULIN (H): ICD-10-CM

## 2024-08-06 DIAGNOSIS — E11.65 TYPE 2 DIABETES MELLITUS WITH HYPERGLYCEMIA, WITH LONG-TERM CURRENT USE OF INSULIN (H): ICD-10-CM

## 2024-08-06 LAB
ALBUMIN SERPL BCG-MCNC: 4.8 G/DL (ref 3.5–5.2)
ALP SERPL-CCNC: 97 U/L (ref 40–150)
ALT SERPL W P-5'-P-CCNC: 31 U/L (ref 0–50)
ANION GAP SERPL CALCULATED.3IONS-SCNC: 15 MMOL/L (ref 7–15)
AST SERPL W P-5'-P-CCNC: 28 U/L (ref 0–45)
BILIRUB SERPL-MCNC: 0.9 MG/DL
BUN SERPL-MCNC: 19.6 MG/DL (ref 6–20)
CALCIUM SERPL-MCNC: 10.6 MG/DL (ref 8.8–10.4)
CHLORIDE SERPL-SCNC: 98 MMOL/L (ref 98–107)
CREAT SERPL-MCNC: 0.62 MG/DL (ref 0.51–0.95)
EGFRCR SERPLBLD CKD-EPI 2021: >90 ML/MIN/1.73M2
GLUCOSE SERPL-MCNC: 181 MG/DL (ref 70–99)
HBA1C MFR BLD: 9.4 % (ref 0–5.6)
HCO3 SERPL-SCNC: 25 MMOL/L (ref 22–29)
POTASSIUM SERPL-SCNC: 3.9 MMOL/L (ref 3.4–5.3)
PROT SERPL-MCNC: 7.8 G/DL (ref 6.4–8.3)
SODIUM SERPL-SCNC: 138 MMOL/L (ref 135–145)

## 2024-08-06 PROCEDURE — 83036 HEMOGLOBIN GLYCOSYLATED A1C: CPT

## 2024-08-06 PROCEDURE — 36415 COLL VENOUS BLD VENIPUNCTURE: CPT

## 2024-08-06 PROCEDURE — 80053 COMPREHEN METABOLIC PANEL: CPT

## 2024-08-22 ENCOUNTER — TRANSFERRED RECORDS (OUTPATIENT)
Dept: HEALTH INFORMATION MANAGEMENT | Facility: CLINIC | Age: 54
End: 2024-08-22
Payer: COMMERCIAL

## 2024-08-26 ENCOUNTER — OFFICE VISIT (OUTPATIENT)
Dept: PEDIATRICS | Facility: CLINIC | Age: 54
End: 2024-08-26
Payer: COMMERCIAL

## 2024-08-26 VITALS
HEIGHT: 64 IN | OXYGEN SATURATION: 97 % | RESPIRATION RATE: 14 BRPM | DIASTOLIC BLOOD PRESSURE: 64 MMHG | BODY MASS INDEX: 31.1 KG/M2 | SYSTOLIC BLOOD PRESSURE: 112 MMHG | TEMPERATURE: 97.8 F | WEIGHT: 182.2 LBS | HEART RATE: 94 BPM

## 2024-08-26 DIAGNOSIS — E11.65 TYPE 2 DIABETES MELLITUS WITH HYPERGLYCEMIA, WITH LONG-TERM CURRENT USE OF INSULIN (H): Primary | ICD-10-CM

## 2024-08-26 DIAGNOSIS — I10 ESSENTIAL HYPERTENSION: ICD-10-CM

## 2024-08-26 DIAGNOSIS — E83.52 SERUM CALCIUM ELEVATED: ICD-10-CM

## 2024-08-26 DIAGNOSIS — Z79.4 TYPE 2 DIABETES MELLITUS WITH HYPERGLYCEMIA, WITH LONG-TERM CURRENT USE OF INSULIN (H): Primary | ICD-10-CM

## 2024-08-26 PROCEDURE — 99214 OFFICE O/P EST MOD 30 MIN: CPT | Performed by: INTERNAL MEDICINE

## 2024-08-26 PROCEDURE — G2211 COMPLEX E/M VISIT ADD ON: HCPCS | Performed by: INTERNAL MEDICINE

## 2024-08-26 NOTE — PROGRESS NOTES
Assessment & Plan     Type 2 diabetes mellitus with hyperglycemia, with long-term current use of insulin (H)  Much improved. Plan increase jardiance and switch to zepbound, as weight stable on ozempic.  - empagliflozin (JARDIANCE) 25 MG TABS tablet; Take 1 tablet (25 mg) by mouth daily.  - tirzepatide (MOUNJARO) 10 MG/0.5ML pen; Inject 10 mg subcutaneously every 7 days.  - Lipid panel reflex to direct LDL Fasting; Future  - **Hemoglobin A1c FUTURE 3mo; Future  - **Comprehensive metabolic panel FUTURE 2mo; Future    Serum calcium elevated  Further evaluation needed. May be due to thiazide diuretic. Could consider discontinuing as her bp is lower with her weight loss.   - Parathyroid Hormone Intact; Future  - Vitamin D Deficiency; Future  - Ionized Calcium; Future    Hypertension  Bp lower than usual. No dizziness upon standing up.     The longitudinal plan of care for the diagnosis(es)/condition(s) as documented were addressed during this visit. Due to the added complexity in care, I will continue to support Lala in the subsequent management and with ongoing continuity of care.      39 minutes spent by me on the date of the encounter doing chart review, history and exam, documentation and further activities per the note    See Patient Instructions    Subjective   Lala is a 54 year old, presenting for the following health issues:  Diabetes      8/26/2024     8:16 AM   Additional Questions   Roomed by Monae Miles CMA     History of Present Illness       Diabetes:   She presents for follow up of diabetes.   She is checking home blood glucose with a continuous glucose monitor.   She checks blood glucose before and after meals.  Blood glucose is sometimes over 200 and never under 70. She is aware of hypoglycemia symptoms including shakiness and weakness.    She has no concerns regarding her diabetes at this time.   She is not experiencing numbness or burning in feet, excessive thirst, blurry vision, weight changes or  "redness, sores or blisters on feet. The patient has not had a diabetic eye exam in the last 12 months.          She eats 0-1 servings of fruits and vegetables daily.She consumes 1 sweetened beverage(s) daily.She exercises with enough effort to increase her heart rate 9 or less minutes per day.  She exercises with enough effort to increase her heart rate 3 or less days per week. She is missing 2 dose(s) of medications per week.     Does think the new sensor has helped. Easy to see, rings when it's high. Likes that it's constantly on. Better at getting all of her medication doses in.     Taking tresiba 50-60 mg once daily, but sometimes misses a day. Takes between 50 and 60.        Review of Systems  Constitutional, HEENT, cardiovascular, pulmonary, gi and gu systems are negative, except as otherwise noted.      Objective    /64 (BP Location: Right arm, Patient Position: Sitting, Cuff Size: Adult Large)   Pulse 94   Temp 97.8  F (36.6  C) (Temporal)   Resp 14   Ht 1.626 m (5' 4\")   Wt 82.6 kg (182 lb 3.2 oz)   LMP 01/18/2013   SpO2 97%   BMI 31.27 kg/m    Body mass index is 31.27 kg/m .  Physical Exam   GENERAL: alert and no distress  PSYCH: mentation appears normal, affect normal/bright    Lab on 08/06/2024   Component Date Value Ref Range Status    Hemoglobin A1C 08/06/2024 9.4 (H)  0.0 - 5.6 % Final    Normal <5.7%   Prediabetes 5.7-6.4%    Diabetes 6.5% or higher     Note: Adopted from ADA consensus guidelines.    Sodium 08/06/2024 138  135 - 145 mmol/L Final    Potassium 08/06/2024 3.9  3.4 - 5.3 mmol/L Final    Carbon Dioxide (CO2) 08/06/2024 25  22 - 29 mmol/L Final    Anion Gap 08/06/2024 15  7 - 15 mmol/L Final    Urea Nitrogen 08/06/2024 19.6  6.0 - 20.0 mg/dL Final    Creatinine 08/06/2024 0.62  0.51 - 0.95 mg/dL Final    GFR Estimate 08/06/2024 >90  >60 mL/min/1.73m2 Final    eGFR calculated using 2021 CKD-EPI equation.    Calcium 08/06/2024 10.6 (H)  8.8 - 10.4 mg/dL Final    Reference " intervals for this test were updated on 7/16/2024 to reflect our healthy population more accurately. There may be differences in the flagging of prior results with similar values performed with this method. Those prior results can be interpreted in the context of the updated reference intervals.    Chloride 08/06/2024 98  98 - 107 mmol/L Final    Glucose 08/06/2024 181 (H)  70 - 99 mg/dL Final    Alkaline Phosphatase 08/06/2024 97  40 - 150 U/L Final    AST 08/06/2024 28  0 - 45 U/L Final    ALT 08/06/2024 31  0 - 50 U/L Final    Protein Total 08/06/2024 7.8  6.4 - 8.3 g/dL Final    Albumin 08/06/2024 4.8  3.5 - 5.2 g/dL Final    Bilirubin Total 08/06/2024 0.9  <=1.2 mg/dL Final           Signed Electronically by: Heidy Jama MD

## 2024-08-26 NOTE — PATIENT INSTRUCTIONS
Increase Jardiance from 10 up to 25 mg daily.    Take 60 units Tresiba daily.  If you are having blood sugars <80, decrease Tresiba (degludec) by 4 units/day.    Continue sliding scale short acting.     After you finish the last 2 weeks on semaglutide (Ozempic) 2 mg weekly, switch to Tirzepatide 10 mg. (Mounjaro). It's a little more potent for glucose control and weight loss than Ozempic.  We can increase if needed up as high as 15 mg. Let me know after 4 weeks on 10 mg if you would like to increase to 12.5.

## 2024-09-12 ENCOUNTER — TRANSFERRED RECORDS (OUTPATIENT)
Dept: HEALTH INFORMATION MANAGEMENT | Facility: CLINIC | Age: 54
End: 2024-09-12
Payer: COMMERCIAL

## 2024-09-12 LAB — RETINOPATHY: NEGATIVE

## 2024-11-03 ENCOUNTER — E-VISIT (OUTPATIENT)
Dept: PEDIATRICS | Facility: CLINIC | Age: 54
End: 2024-11-03
Payer: COMMERCIAL

## 2024-11-03 DIAGNOSIS — J01.90 ACUTE SINUSITIS WITH SYMPTOMS > 10 DAYS: Primary | ICD-10-CM

## 2024-11-03 PROCEDURE — 99421 OL DIG E/M SVC 5-10 MIN: CPT | Performed by: INTERNAL MEDICINE

## 2024-11-04 RX ORDER — IPRATROPIUM BROMIDE 42 UG/1
2 SPRAY, METERED NASAL 4 TIMES DAILY
Qty: 15 ML | Refills: 0 | Status: SHIPPED | OUTPATIENT
Start: 2024-11-04

## 2024-11-04 RX ORDER — GUAIFENESIN 1200 MG/1
1200 TABLET, EXTENDED RELEASE ORAL 2 TIMES DAILY
Qty: 60 TABLET | Refills: 0 | Status: SHIPPED | OUTPATIENT
Start: 2024-11-04

## 2024-11-18 ENCOUNTER — LAB (OUTPATIENT)
Dept: LAB | Facility: CLINIC | Age: 54
End: 2024-11-18
Payer: COMMERCIAL

## 2024-11-18 DIAGNOSIS — Z79.4 TYPE 2 DIABETES MELLITUS WITH HYPERGLYCEMIA, WITH LONG-TERM CURRENT USE OF INSULIN (H): ICD-10-CM

## 2024-11-18 DIAGNOSIS — E83.52 SERUM CALCIUM ELEVATED: ICD-10-CM

## 2024-11-18 DIAGNOSIS — E11.65 TYPE 2 DIABETES MELLITUS WITH HYPERGLYCEMIA, WITH LONG-TERM CURRENT USE OF INSULIN (H): ICD-10-CM

## 2024-11-18 LAB
ALBUMIN SERPL BCG-MCNC: 4.3 G/DL (ref 3.5–5.2)
ALP SERPL-CCNC: 101 U/L (ref 40–150)
ALT SERPL W P-5'-P-CCNC: 20 U/L (ref 0–50)
ANION GAP SERPL CALCULATED.3IONS-SCNC: 12 MMOL/L (ref 7–15)
AST SERPL W P-5'-P-CCNC: 19 U/L (ref 0–45)
BILIRUB SERPL-MCNC: 0.7 MG/DL
BUN SERPL-MCNC: 16.7 MG/DL (ref 6–20)
CA-I BLD-MCNC: 4.5 MG/DL (ref 4.4–5.2)
CALCIUM SERPL-MCNC: 9.3 MG/DL (ref 8.8–10.4)
CHLORIDE SERPL-SCNC: 99 MMOL/L (ref 98–107)
CHOLEST SERPL-MCNC: 141 MG/DL
CREAT SERPL-MCNC: 0.62 MG/DL (ref 0.51–0.95)
EGFRCR SERPLBLD CKD-EPI 2021: >90 ML/MIN/1.73M2
EST. AVERAGE GLUCOSE BLD GHB EST-MCNC: 189 MG/DL
FASTING STATUS PATIENT QL REPORTED: YES
FASTING STATUS PATIENT QL REPORTED: YES
GLUCOSE SERPL-MCNC: 308 MG/DL (ref 70–99)
HBA1C MFR BLD: 8.2 % (ref 0–5.6)
HCO3 SERPL-SCNC: 26 MMOL/L (ref 22–29)
HDLC SERPL-MCNC: 48 MG/DL
LDLC SERPL CALC-MCNC: 57 MG/DL
NONHDLC SERPL-MCNC: 93 MG/DL
POTASSIUM SERPL-SCNC: 3.7 MMOL/L (ref 3.4–5.3)
PROT SERPL-MCNC: 7.1 G/DL (ref 6.4–8.3)
PTH-INTACT SERPL-MCNC: 21 PG/ML (ref 15–65)
SODIUM SERPL-SCNC: 137 MMOL/L (ref 135–145)
TRIGL SERPL-MCNC: 179 MG/DL
VIT D+METAB SERPL-MCNC: 20 NG/ML (ref 20–50)

## 2024-11-18 PROCEDURE — 80053 COMPREHEN METABOLIC PANEL: CPT

## 2024-11-18 PROCEDURE — 83970 ASSAY OF PARATHORMONE: CPT

## 2024-11-18 PROCEDURE — 82306 VITAMIN D 25 HYDROXY: CPT

## 2024-11-18 PROCEDURE — 83036 HEMOGLOBIN GLYCOSYLATED A1C: CPT

## 2024-11-18 PROCEDURE — 80061 LIPID PANEL: CPT

## 2024-11-18 PROCEDURE — 36415 COLL VENOUS BLD VENIPUNCTURE: CPT

## 2024-11-18 PROCEDURE — 82330 ASSAY OF CALCIUM: CPT

## 2024-11-20 ENCOUNTER — OFFICE VISIT (OUTPATIENT)
Dept: PEDIATRICS | Facility: CLINIC | Age: 54
End: 2024-11-20
Attending: INTERNAL MEDICINE
Payer: COMMERCIAL

## 2024-11-20 VITALS
HEIGHT: 64 IN | WEIGHT: 184.2 LBS | SYSTOLIC BLOOD PRESSURE: 142 MMHG | BODY MASS INDEX: 31.45 KG/M2 | RESPIRATION RATE: 18 BRPM | DIASTOLIC BLOOD PRESSURE: 82 MMHG | HEART RATE: 90 BPM | TEMPERATURE: 98 F | OXYGEN SATURATION: 97 %

## 2024-11-20 DIAGNOSIS — E66.09 CLASS 1 OBESITY DUE TO EXCESS CALORIES WITH SERIOUS COMORBIDITY AND BODY MASS INDEX (BMI) OF 34.0 TO 34.9 IN ADULT: ICD-10-CM

## 2024-11-20 DIAGNOSIS — Z00.00 ROUTINE GENERAL MEDICAL EXAMINATION AT A HEALTH CARE FACILITY: Primary | ICD-10-CM

## 2024-11-20 DIAGNOSIS — E66.811 CLASS 1 OBESITY DUE TO EXCESS CALORIES WITH SERIOUS COMORBIDITY AND BODY MASS INDEX (BMI) OF 34.0 TO 34.9 IN ADULT: ICD-10-CM

## 2024-11-20 DIAGNOSIS — Z79.4 TYPE 2 DIABETES MELLITUS WITH HYPERGLYCEMIA, WITH LONG-TERM CURRENT USE OF INSULIN (H): ICD-10-CM

## 2024-11-20 DIAGNOSIS — Z12.31 ENCOUNTER FOR SCREENING MAMMOGRAM FOR BREAST CANCER: ICD-10-CM

## 2024-11-20 DIAGNOSIS — E11.65 TYPE 2 DIABETES MELLITUS WITH HYPERGLYCEMIA, WITH LONG-TERM CURRENT USE OF INSULIN (H): ICD-10-CM

## 2024-11-20 DIAGNOSIS — I10 ESSENTIAL HYPERTENSION: ICD-10-CM

## 2024-11-20 RX ORDER — INSULIN LISPRO 100 [IU]/ML
INJECTION, SOLUTION INTRAVENOUS; SUBCUTANEOUS
Qty: 15 ML | Refills: 11 | Status: SHIPPED | OUTPATIENT
Start: 2024-11-20

## 2024-11-20 RX ORDER — AMLODIPINE BESYLATE 10 MG/1
10 TABLET ORAL DAILY
Qty: 30 TABLET | Refills: 11 | Status: SHIPPED | OUTPATIENT
Start: 2024-11-20

## 2024-11-20 SDOH — HEALTH STABILITY: PHYSICAL HEALTH: ON AVERAGE, HOW MANY DAYS PER WEEK DO YOU ENGAGE IN MODERATE TO STRENUOUS EXERCISE (LIKE A BRISK WALK)?: 1 DAY

## 2024-11-20 ASSESSMENT — ASTHMA QUESTIONNAIRES
QUESTION_1 LAST FOUR WEEKS HOW MUCH OF THE TIME DID YOUR ASTHMA KEEP YOU FROM GETTING AS MUCH DONE AT WORK, SCHOOL OR AT HOME: NONE OF THE TIME
QUESTION_2 LAST FOUR WEEKS HOW OFTEN HAVE YOU HAD SHORTNESS OF BREATH: NOT AT ALL
QUESTION_4 LAST FOUR WEEKS HOW OFTEN HAVE YOU USED YOUR RESCUE INHALER OR NEBULIZER MEDICATION (SUCH AS ALBUTEROL): NOT AT ALL
QUESTION_3 LAST FOUR WEEKS HOW OFTEN DID YOUR ASTHMA SYMPTOMS (WHEEZING, COUGHING, SHORTNESS OF BREATH, CHEST TIGHTNESS OR PAIN) WAKE YOU UP AT NIGHT OR EARLIER THAN USUAL IN THE MORNING: NOT AT ALL
ACT_TOTALSCORE: 24
ACT_TOTALSCORE: 24
QUESTION_5 LAST FOUR WEEKS HOW WOULD YOU RATE YOUR ASTHMA CONTROL: WELL CONTROLLED

## 2024-11-20 ASSESSMENT — PAIN SCALES - GENERAL: PAINLEVEL_OUTOF10: NO PAIN (0)

## 2024-11-20 ASSESSMENT — SOCIAL DETERMINANTS OF HEALTH (SDOH): HOW OFTEN DO YOU GET TOGETHER WITH FRIENDS OR RELATIVES?: THREE TIMES A WEEK

## 2024-11-20 NOTE — PATIENT INSTRUCTIONS
After 4 weeks on Mounjaro 10 mg, if you are not noticing a difference, let me know and we can increase to 12.5. if 10 mg feels like a good dose, we can leave it there.     If you are having blood sugars <90, decrease Tresiba (degludec) by 4 units/day every 2-3 days.    Restart atrovent nasal spray if you get stuffy or feel like a sinus infection is going on. You can try guaifenacin to thin the mucous and make it easier to get out.   Afrin OTC can help within minutes to shrink mucous membranes in the nose. Just don't use more than 3 days in a row.     Calcium labs look normal on recheck. In future, we may still want to consider stopping hydrochlorothiazide  (is combined with losartan).     Patient Education   Preventive Care Advice   This is general advice given by our system to help you stay healthy. However, your care team may have specific advice just for you. Please talk to your care team about your preventive care needs.  Nutrition  Eat 5 or more servings of fruits and vegetables each day.  Try wheat bread, brown rice and whole grain pasta (instead of white bread, rice, and pasta).  Get enough calcium and vitamin D. Check the label on foods and aim for 100% of the RDA (recommended daily allowance).  Lifestyle  Exercise at least 150 minutes each week  (30 minutes a day, 5 days a week).  Do muscle strengthening activities 2 days a week. These help control your weight and prevent disease.  No smoking.  Wear sunscreen to prevent skin cancer.  Have a dental exam and cleaning every 6 months.  Yearly exams  See your health care team every year to talk about:  Any changes in your health.  Any medicines your care team has prescribed.  Preventive care, family planning, and ways to prevent chronic diseases.  Shots (vaccines)   HPV shots (up to age 26), if you've never had them before.  Hepatitis B shots (up to age 59), if you've never had them before.  COVID-19 shot: Get this shot when it's due.  Flu shot: Get a flu shot  every year.  Tetanus shot: Get a tetanus shot every 10 years.  Pneumococcal, hepatitis A, and RSV shots: Ask your care team if you need these based on your risk.  Shingles shot (for age 50 and up)  General health tests  Diabetes screening:  Starting at age 35, Get screened for diabetes at least every 3 years.  If you are younger than age 35, ask your care team if you should be screened for diabetes.  Cholesterol test: At age 39, start having a cholesterol test every 5 years, or more often if advised.  Bone density scan (DEXA): At age 50, ask your care team if you should have this scan for osteoporosis (brittle bones).  Hepatitis C: Get tested at least once in your life.  STIs (sexually transmitted infections)  Before age 24: Ask your care team if you should be screened for STIs.  After age 24: Get screened for STIs if you're at risk. You are at risk for STIs (including HIV) if:  You are sexually active with more than one person.  You don't use condoms every time.  You or a partner was diagnosed with a sexually transmitted infection.  If you are at risk for HIV, ask about PrEP medicine to prevent HIV.  Get tested for HIV at least once in your life, whether you are at risk for HIV or not.  Cancer screening tests  Cervical cancer screening: If you have a cervix, begin getting regular cervical cancer screening tests starting at age 21.  Breast cancer scan (mammogram): If you've ever had breasts, begin having regular mammograms starting at age 40. This is a scan to check for breast cancer.  Colon cancer screening: It is important to start screening for colon cancer at age 45.  Have a colonoscopy test every 10 years (or more often if you're at risk) Or, ask your provider about stool tests like a FIT test every year or Cologuard test every 3 years.  To learn more about your testing options, visit:   .  For help making a decision, visit:   https://bit.ly/gm48014.  Prostate cancer screening test: If you have a prostate, ask  your care team if a prostate cancer screening test (PSA) at age 55 is right for you.  Lung cancer screening: If you are a current or former smoker ages 50 to 80, ask your care team if ongoing lung cancer screenings are right for you.  For informational purposes only. Not to replace the advice of your health care provider. Copyright   2023 [facility]. All rights reserved. Clinically reviewed by the [facility]. [employer] [id number] - REV 01/24.

## 2024-11-20 NOTE — PROGRESS NOTES
"Preventive Care Visit  [facility] ZAYNAB Jama MD, Internal Medicine  Nov 20, 2024      Assessment & Plan     Routine general medical examination at a health care facility      Type 2 diabetes mellitus with hyperglycemia, with long-term current use of insulin (H)  A1C continues to decrease. Congratulations given. Has yet to switch to Mounjaro as she was using her Ozempic up. Will be starting Mounjaro this week. Discussed importance of watching her glucose levels and adjusting insulin if needed.  - empagliflozin (JARDIANCE) 25 MG TABS tablet; Take 1 tablet (25 mg) by mouth daily.  - insulin lispro (HUMALOG KWIKPEN) 100 UNIT/ML (1 unit dial) KWIKPEN; 25 units before each meal + 1 unit per 25 points of blood sugar above 150. Max daily dose 80 units per day. Patient will call when needs filled.  - FOOT EXAM  - **Hemoglobin A1c FUTURE 3mo; Future    Essential hypertension  Elevated, not checking at home. Plan increase amlodipine. Discussed possibility of edema. Given recent borderline hypercalcemia, would like to try stopping hydrochlorothiazide if continues to be borderline.  - amLODIPine (NORVASC) 10 MG tablet; Take 1 tablet (10 mg) by mouth daily.    Class 1 obesity due to excess calories with serious comorbidity and body mass index (BMI) of 34.0 to 34.9 in adult  Weight stable. Will focus on adjusting Mounjaro    Encounter for screening mammogram for breast cancer    - MA Screen Bilateral w/Danyel; Future        BMI  Estimated body mass index is 31.37 kg/m  as calculated from the following:    Height as of this encounter: 1.632 m (5' 4.25\").    Weight as of this encounter: 83.6 kg (184 lb 3.2 oz).   Weight management plan: Discussed healthy diet and exercise guidelines    Counseling  Appropriate preventive services were addressed with this patient via screening, questionnaire, or discussion as appropriate for fall prevention, nutrition, physical activity, Tobacco-use cessation, social " engagement, weight loss and cognition.  Checklist reviewing preventive services available has been given to the patient.  Reviewed patient's diet, addressing concerns and/or questions.   She is at risk for lack of exercise and has been provided with information to increase physical activity for the benefit of her well-being.       See Patient Instructions    Subjective   Lala is a 54 year old, presenting for the following:  Physical        11/20/2024     8:18 AM   Additional Questions   Roomed by Josselyn   Accompanied by none         11/20/2024     8:18 AM   Patient Reported Additional Medications   Patient reports taking the following new medications none          HPI    Tresiba 60 units once daily.    Not needing lispro often. If pre-meal glucose under 200, she doesn't use. Plan is for 2-3 insulin injections per day, though she doesn't always do them.       Health Care Directive  Patient does not have a Health Care Directive: Discussed advance care planning with patient; however, patient declined at this time.      11/20/2024   General Health   How would you rate your overall physical health? Good   Feel stress (tense, anxious, or unable to sleep) Not at all            11/20/2024   Nutrition   Three or more servings of calcium each day? Yes   Diet: I don't know   How many servings of fruit and vegetables per day? (!) 0-1   How many sweetened beverages each day? 0-1            11/20/2024   Exercise   Days per week of moderate/strenous exercise 1 day      (!) EXERCISE CONCERN      11/20/2024   Social Factors   Frequency of gathering with friends or relatives Three times a week   Worry food won't last until get money to buy more No   Food not last or not have enough money for food? No   Do you have housing? (Housing is defined as stable permanent housing and does not include staying ouside in a car, in a tent, in an abandoned building, in an overnight shelter, or couch-surfing.) No   Are you worried about losing your  housing? No   Lack of transportation? No   Unable to get utilities (heat,electricity)? No   Want help with housing or utility concern? No      (!) HOUSING CONCERN PRESENT      11/20/2024   Fall Risk   Fallen 2 or more times in the past year? No    Trouble with walking or balance? No        Patient-reported          11/20/2024   Dental   Dentist two times every year? Yes            11/20/2024   TB Screening   Were you born outside of the [place]? No              Today's PHQ-2 Score:       5/6/2024     8:44 AM   PHQ-2 ( 1999 Pf[employer])   Q1: Little interest or pleasure in doing things 0    Q2: Feeling down, depressed or hopeless 0    PHQ-2 Score 0   Q1: Little interest or pleasure in doing things Not at all   Q2: Feeling down, depressed or hopeless Not at all   PHQ-2 Score 0       Patient-reported         11/20/2024   Substance Use   Alcohol more than 3/day or more than 7/wk No   Do you use any other substances recreationally? No        Social History     Tobacco Use    Smoking status: Never     Passive exposure: Never    Smokeless tobacco: Never   Vaping Use    Vaping status: Never Used   Substance Use Topics    Alcohol use: Yes     Comment: rare--one to two per week    Drug use: No           2/5/2024   LAST FHS-7 RESULTS   1st degree relative breast or ovarian cancer Yes   Any relative bilateral breast cancer Yes   Any male have breast cancer No   Any ONE woman have BOTH breast AND ovarian cancer Yes   Any woman with breast cancer before 50yrs Yes   2 or more relatives with breast AND/OR ovarian cancer No   2 or more relatives with breast AND/OR bowel cancer No           Mammogram Screening - Mammogram every 1-2 years updated in Health Maintenance based on mutual decision making        11/20/2024   STI Screening   New sexual partner(s) since last STI/HIV test? No        History of abnormal Pap smear: No - age 30-64 HPV with reflex Pap every 5 years recommended        Latest Ref Rng & Units 11/6/2023    10:08 AM 2/21/2018     " 1:53 PM 7/14/2014    12:00 AM   PAP / HPV   PAP  Negative for Intraepithelial Lesion or Malignancy (NILM)      PAP (Historical)   NIL  NIL    HPV 16 DNA Negative Negative  Negative     HPV 18 DNA Negative Negative  Negative     Other HR HPV Negative Negative  Negative       ASCVD Risk   The 10-year ASCVD risk score (Birgit SALEH, et al., 2019) is: 5.1%    Values used to calculate the score:      Age: 54 years      Sex: Female      Is Non- : No      Diabetic: Yes      Tobacco smoker: No      Systolic Blood Pressure: 136 mmHg      Is BP treated: Yes      HDL Cholesterol: 48 mg/dL      Total Cholesterol: 141 mg/dL      Reviewed and updated as needed this visit by Provider                    Lab work is in process  Labs reviewed in EPIC      Review of Systems  Constitutional, HEENT, cardiovascular, pulmonary, gi and gu systems are negative, except as otherwise noted.     Objective    Exam  BP (!) 136/96 (BP Location: Right arm, Patient Position: Sitting, Cuff Size: Adult Regular)   Pulse 90   Temp 98  F (36.7  C) (Temporal)   Resp 18   Ht 1.632 m (5' 4.25\")   Wt 83.6 kg (184 lb 3.2 oz)   LMP 01/18/2013 (Exact Date)   SpO2 97%   BMI 31.37 kg/m     Estimated body mass index is 31.37 kg/m  as calculated from the following:    Height as of this encounter: 1.632 m (5' 4.25\").    Weight as of this encounter: 83.6 kg (184 lb 3.2 oz).    Physical Exam  GENERAL: alert and no distress  NECK: no adenopathy, no asymmetry, masses, or scars  RESP: lungs clear to auscultation - no rales, rhonchi or wheezes  CV: regular rate and rhythm, normal S1 S2, no S3 or S4, no murmur, click or rub, no peripheral edema  ABDOMEN: soft, nontender, no hepatosplenomegaly, no masses and bowel sounds normal  MS: no gross musculoskeletal defects noted, no edema  NEURO: Normal strength and tone, mentation intact and speech normal  Diabetic foot exam: normal DP and PT pulses, no trophic changes or ulcerative lesions, " normal sensory exam, and normal monofilament exam        Signed Electronically by: Heidy Jama MD

## 2024-11-21 ENCOUNTER — PATIENT OUTREACH (OUTPATIENT)
Dept: CARE COORDINATION | Facility: CLINIC | Age: 54
End: 2024-11-21
Payer: COMMERCIAL

## 2024-12-18 ENCOUNTER — ALLIED HEALTH/NURSE VISIT (OUTPATIENT)
Dept: PEDIATRICS | Facility: CLINIC | Age: 54
End: 2024-12-18
Payer: COMMERCIAL

## 2024-12-18 VITALS — DIASTOLIC BLOOD PRESSURE: 75 MMHG | SYSTOLIC BLOOD PRESSURE: 122 MMHG

## 2024-12-18 DIAGNOSIS — Z01.30 BP CHECK: Primary | ICD-10-CM

## 2024-12-18 PROCEDURE — 99207 PR NO CHARGE NURSE ONLY: CPT

## 2024-12-18 NOTE — PROGRESS NOTES
I met with Lala George at the request of Evita Elizondo MD to recheck her blood pressure.  Blood pressure medications on the med list were reviewed with patient.    Patient has taken all medications as per usual regimen: Yes  Patient reports tolerating them without any issues or concerns: Yes    Vitals:    12/18/24 0836   BP: 122/75   BP Location: Right arm   Patient Position: Sitting   Cuff Size: Adult Large       Blood pressure was taken, previous encounter was reviewed, recorded blood pressure below 140/90.  Patient was discharged and the note will be sent to the provider for final review.

## 2025-04-04 DIAGNOSIS — E11.65 TYPE 2 DIABETES MELLITUS WITH HYPERGLYCEMIA, WITH LONG-TERM CURRENT USE OF INSULIN (H): Primary | ICD-10-CM

## 2025-04-04 DIAGNOSIS — Z79.4 TYPE 2 DIABETES MELLITUS WITH HYPERGLYCEMIA, WITH LONG-TERM CURRENT USE OF INSULIN (H): Primary | ICD-10-CM

## 2025-04-08 RX ORDER — TIRZEPATIDE 10 MG/.5ML
INJECTION, SOLUTION SUBCUTANEOUS
Qty: 2 ML | Refills: 11 | Status: SHIPPED | OUTPATIENT
Start: 2025-04-08

## 2025-04-19 ENCOUNTER — HEALTH MAINTENANCE LETTER (OUTPATIENT)
Age: 55
End: 2025-04-19

## 2025-04-23 ENCOUNTER — HOSPITAL ENCOUNTER (OUTPATIENT)
Dept: MAMMOGRAPHY | Facility: CLINIC | Age: 55
Discharge: HOME | End: 2025-04-23
Attending: INTERNAL MEDICINE
Payer: COMMERCIAL

## 2025-04-23 DIAGNOSIS — Z12.31 ENCOUNTER FOR SCREENING MAMMOGRAM FOR BREAST CANCER: ICD-10-CM

## 2025-04-23 PROCEDURE — 77063 BREAST TOMOSYNTHESIS BI: CPT

## 2025-05-19 DIAGNOSIS — E11.65 TYPE 2 DIABETES MELLITUS WITH HYPERGLYCEMIA, WITH LONG-TERM CURRENT USE OF INSULIN (H): ICD-10-CM

## 2025-05-19 DIAGNOSIS — Z79.4 TYPE 2 DIABETES MELLITUS WITH HYPERGLYCEMIA, WITH LONG-TERM CURRENT USE OF INSULIN (H): ICD-10-CM

## 2025-05-20 RX ORDER — ACYCLOVIR 800 MG/1
TABLET ORAL
Qty: 6 EACH | Refills: 11 | Status: SHIPPED | OUTPATIENT
Start: 2025-05-20

## 2025-05-20 ASSESSMENT — ASTHMA QUESTIONNAIRES
QUESTION_2 LAST FOUR WEEKS HOW OFTEN HAVE YOU HAD SHORTNESS OF BREATH: NOT AT ALL
QUESTION_4 LAST FOUR WEEKS HOW OFTEN HAVE YOU USED YOUR RESCUE INHALER OR NEBULIZER MEDICATION (SUCH AS ALBUTEROL): NOT AT ALL
QUESTION_5 LAST FOUR WEEKS HOW WOULD YOU RATE YOUR ASTHMA CONTROL: WELL CONTROLLED
QUESTION_3 LAST FOUR WEEKS HOW OFTEN DID YOUR ASTHMA SYMPTOMS (WHEEZING, COUGHING, SHORTNESS OF BREATH, CHEST TIGHTNESS OR PAIN) WAKE YOU UP AT NIGHT OR EARLIER THAN USUAL IN THE MORNING: NOT AT ALL
ACT_TOTALSCORE: 24
QUESTION_1 LAST FOUR WEEKS HOW MUCH OF THE TIME DID YOUR ASTHMA KEEP YOU FROM GETTING AS MUCH DONE AT WORK, SCHOOL OR AT HOME: NONE OF THE TIME

## 2025-05-21 ENCOUNTER — RESULTS FOLLOW-UP (OUTPATIENT)
Dept: PEDIATRICS | Facility: CLINIC | Age: 55
End: 2025-05-21

## 2025-05-21 ENCOUNTER — OFFICE VISIT (OUTPATIENT)
Dept: PEDIATRICS | Facility: CLINIC | Age: 55
End: 2025-05-21
Payer: COMMERCIAL

## 2025-05-21 VITALS
TEMPERATURE: 98.2 F | OXYGEN SATURATION: 98 % | HEART RATE: 85 BPM | SYSTOLIC BLOOD PRESSURE: 120 MMHG | HEIGHT: 64 IN | RESPIRATION RATE: 14 BRPM | BODY MASS INDEX: 30.29 KG/M2 | DIASTOLIC BLOOD PRESSURE: 77 MMHG | WEIGHT: 177.4 LBS

## 2025-05-21 DIAGNOSIS — Z79.4 TYPE 2 DIABETES MELLITUS WITH HYPERGLYCEMIA, WITH LONG-TERM CURRENT USE OF INSULIN (H): Primary | ICD-10-CM

## 2025-05-21 DIAGNOSIS — F43.23 ADJUSTMENT DISORDER WITH MIXED ANXIETY AND DEPRESSED MOOD: ICD-10-CM

## 2025-05-21 DIAGNOSIS — E11.65 TYPE 2 DIABETES MELLITUS WITH HYPERGLYCEMIA, WITH LONG-TERM CURRENT USE OF INSULIN (H): Primary | ICD-10-CM

## 2025-05-21 DIAGNOSIS — I10 ESSENTIAL HYPERTENSION: ICD-10-CM

## 2025-05-21 DIAGNOSIS — I10 ESSENTIAL HYPERTENSION, BENIGN: ICD-10-CM

## 2025-05-21 DIAGNOSIS — E78.5 HYPERLIPIDEMIA LDL GOAL <100: ICD-10-CM

## 2025-05-21 LAB
EST. AVERAGE GLUCOSE BLD GHB EST-MCNC: 249 MG/DL
HBA1C MFR BLD: 10.3 % (ref 0–5.6)

## 2025-05-21 PROCEDURE — G2211 COMPLEX E/M VISIT ADD ON: HCPCS | Performed by: INTERNAL MEDICINE

## 2025-05-21 PROCEDURE — 36415 COLL VENOUS BLD VENIPUNCTURE: CPT | Performed by: INTERNAL MEDICINE

## 2025-05-21 PROCEDURE — 99213 OFFICE O/P EST LOW 20 MIN: CPT | Performed by: INTERNAL MEDICINE

## 2025-05-21 PROCEDURE — 3078F DIAST BP <80 MM HG: CPT | Performed by: INTERNAL MEDICINE

## 2025-05-21 PROCEDURE — 3074F SYST BP LT 130 MM HG: CPT | Performed by: INTERNAL MEDICINE

## 2025-05-21 PROCEDURE — 83036 HEMOGLOBIN GLYCOSYLATED A1C: CPT | Performed by: INTERNAL MEDICINE

## 2025-05-21 PROCEDURE — 3046F HEMOGLOBIN A1C LEVEL >9.0%: CPT | Performed by: INTERNAL MEDICINE

## 2025-05-21 RX ORDER — ATORVASTATIN CALCIUM 10 MG/1
10 TABLET, FILM COATED ORAL DAILY
Qty: 90 TABLET | Refills: 4 | Status: SHIPPED | OUTPATIENT
Start: 2025-05-21

## 2025-05-21 RX ORDER — AMLODIPINE BESYLATE 10 MG/1
10 TABLET ORAL DAILY
Qty: 30 TABLET | Refills: 11 | Status: SHIPPED | OUTPATIENT
Start: 2025-05-21

## 2025-05-21 RX ORDER — LOSARTAN POTASSIUM AND HYDROCHLOROTHIAZIDE 25; 100 MG/1; MG/1
1 TABLET ORAL DAILY
Qty: 90 TABLET | Refills: 3 | Status: SHIPPED | OUTPATIENT
Start: 2025-05-21

## 2025-05-21 NOTE — PATIENT INSTRUCTIONS
Let me know if your sugars are still above 200 after 4 weeks on 12.5 mg dose.    After 4 weeks, if sugars are still elevated, we can increase to 15 mg weekly.

## 2025-05-21 NOTE — PROGRESS NOTES
"  {PROVIDER CHARTING PREFERENCE:423908}    Steve Reeves is a 55 year old, presenting for the following health issues:  Follow Up      5/21/2025     9:45 AM   Additional Questions   Roomed by BO MARSHALL     History of Present Illness       Diabetes:   She presents for follow up of diabetes.  She is checking home blood glucose three times daily.   She checks blood glucose before and after meals and at bedtime.  Blood glucose is sometimes over 200 and never under 70. She is aware of hypoglycemia symptoms including shakiness and weakness.    She has no concerns regarding her diabetes at this time.  She is having excessive thirst.            She eats 2-3 servings of fruits and vegetables daily.She consumes 0 sweetened beverage(s) daily.She exercises with enough effort to increase her heart rate 9 or less minutes per day.  She exercises with enough effort to increase her heart rate 3 or less days per week. She is missing 2 dose(s) of medications per week.  She is not taking prescribed medications regularly due to remembering to take.        {MA/LPN/RN Pre-Provider Visit Orders- hCG/UA/Strep (Optional):700494}  {SUPERLIST (Optional):319568}  {additonal problems for provider to add (Optional):788572}    {ROS Picklists (Optional):248901}      Objective    /77 (BP Location: Right arm, Patient Position: Sitting, Cuff Size: Adult Large)   Pulse 85   Temp 98.2  F (36.8  C) (Temporal)   Resp 14   Ht 1.632 m (5' 4.25\")   Wt 80.5 kg (177 lb 6.4 oz)   LMP 01/18/2013 (Exact Date)   SpO2 98%   BMI 30.21 kg/m    Body mass index is 30.21 kg/m .  Physical Exam   {Exam List (Optional):666843}    {Diagnostic Test Results (Optional):249410}        Signed Electronically by: Heidy Jama MD  {Email feedback regarding this note to [email or web address]   :734381}  " "including shakiness and weakness.    She has no concerns regarding her diabetes at this time.  She is having excessive thirst.            She eats 2-3 servings of fruits and vegetables daily.She consumes 0 sweetened beverage(s) daily.She exercises with enough effort to increase her heart rate 9 or less minutes per day.  She exercises with enough effort to increase her heart rate 3 or less days per week. She is missing 2 dose(s) of medications per week.  She is not taking prescribed medications regularly due to remembering to take.        Since last visit has discontinued her insulin. Does note sugars can be above 200. Wondering about increasing mounjaro.    Recently moved and switched her job to another location. So far it's going well.        Review of Systems  Constitutional, HEENT, cardiovascular, pulmonary, gi and gu systems are negative, except as otherwise noted.      Objective    /77 (BP Location: Right arm, Patient Position: Sitting, Cuff Size: Adult Large)   Pulse 85   Temp 98.2  F (36.8  C) (Temporal)   Resp 14   Ht 1.632 m (5' 4.25\")   Wt 80.5 kg (177 lb 6.4 oz)   LMP 01/18/2013 (Exact Date)   SpO2 98%   BMI 30.21 kg/m    Body mass index is 30.21 kg/m .  Physical Exam   GENERAL: alert and no distress    Results for orders placed or performed in visit on 05/21/25   HEMOGLOBIN A1C     Status: Abnormal   Result Value Ref Range    Estimated Average Glucose 249 (H) <117 mg/dL    Hemoglobin A1C 10.3 (H) 0.0 - 5.6 %    Narrative    Results confirmed by repeat test.             Signed Electronically by: Heidy Jama MD    "

## 2025-05-22 ENCOUNTER — TELEPHONE (OUTPATIENT)
Dept: PEDIATRICS | Facility: CLINIC | Age: 55
End: 2025-05-22
Payer: COMMERCIAL

## 2025-05-22 NOTE — TELEPHONE ENCOUNTER
MTM referral from: [facility] visit (referral by provider) Urgent referral    MT referral outreach attempt #1 on May 22, 2025 at 9:27 AM      Outcome: Left Message- per provider:    Use [employer] for the carrier/Plan on the flowsheet      [employer] Message Sent    Bailey Lopez MT

## 2025-05-23 ENCOUNTER — TELEPHONE (OUTPATIENT)
Dept: PEDIATRICS | Facility: CLINIC | Age: 55
End: 2025-05-23
Payer: COMMERCIAL

## 2025-05-23 NOTE — TELEPHONE ENCOUNTER
MTM referral from: [facility] visit (referral by provider)    MTM referral outreach attempt #2 on May 23, 2025 at 2:24 PM      Outcome: No Answer    Use [employer] hp for the carrier/Plan on the flowsheet    Emergency MTM referral, routing to ordering provider       [employer] Message Sent    Roula Judge CMA  MTM

## 2025-09-02 ENCOUNTER — MYC REFILL (OUTPATIENT)
Dept: PEDIATRICS | Facility: CLINIC | Age: 55
End: 2025-09-02
Payer: COMMERCIAL

## 2025-09-02 DIAGNOSIS — Z79.4 TYPE 2 DIABETES MELLITUS WITH HYPERGLYCEMIA, WITH LONG-TERM CURRENT USE OF INSULIN (H): ICD-10-CM

## 2025-09-02 DIAGNOSIS — E11.65 TYPE 2 DIABETES MELLITUS WITH HYPERGLYCEMIA, WITH LONG-TERM CURRENT USE OF INSULIN (H): ICD-10-CM

## 2025-09-02 RX ORDER — METFORMIN HYDROCHLORIDE 500 MG/1
2000 TABLET, EXTENDED RELEASE ORAL
Qty: 360 TABLET | Refills: 1 | Status: SHIPPED | OUTPATIENT
Start: 2025-09-02

## (undated) DEVICE — KIT ENDO TURNOVER/PROCEDURE W/CLEAN A SCOPE LINERS 103888

## (undated) RX ORDER — FENTANYL CITRATE 50 UG/ML
INJECTION, SOLUTION INTRAMUSCULAR; INTRAVENOUS
Status: DISPENSED
Start: 2019-05-03